# Patient Record
Sex: FEMALE | Race: WHITE | NOT HISPANIC OR LATINO | Employment: PART TIME | URBAN - NONMETROPOLITAN AREA
[De-identification: names, ages, dates, MRNs, and addresses within clinical notes are randomized per-mention and may not be internally consistent; named-entity substitution may affect disease eponyms.]

---

## 2017-01-09 ENCOUNTER — HOSPITAL ENCOUNTER (OUTPATIENT)
Dept: EDUCATION SERVICES | Facility: HOSPITAL | Age: 52
Discharge: HOME | End: 2017-01-09
Attending: FAMILY MEDICINE | Admitting: FAMILY MEDICINE
Payer: COMMERCIAL

## 2017-01-09 VITALS
DIASTOLIC BLOOD PRESSURE: 77 MMHG | WEIGHT: 206.5 LBS | BODY MASS INDEX: 32.41 KG/M2 | OXYGEN SATURATION: 96 % | SYSTOLIC BLOOD PRESSURE: 112 MMHG | HEIGHT: 67 IN | HEART RATE: 62 BPM

## 2017-01-09 DIAGNOSIS — Z71.89 ACP (ADVANCE CARE PLANNING): Primary | Chronic | ICD-10-CM

## 2017-01-09 DIAGNOSIS — E11.9 TYPE 2 DIABETES MELLITUS WITHOUT COMPLICATION, WITHOUT LONG-TERM CURRENT USE OF INSULIN (H): ICD-10-CM

## 2017-01-09 PROCEDURE — G0108 DIAB MANAGE TRN  PER INDIV: HCPCS | Performed by: DIETITIAN, REGISTERED

## 2017-01-09 ASSESSMENT — PAIN SCALES - GENERAL: PAINLEVEL: NO PAIN (0)

## 2017-01-09 NOTE — IP AVS SNAPSHOT
MRN:[id number]                      After Visit Summary   1/9/2017    Melba Hill    MRN: [id number]           Thank you!     Thank you for choosing [facility] for your care. Our goal is always to provide you with excellent care. Hearing back from our patients is one way we can continue to improve our services. Please take a few minutes to complete the written survey that you may receive in the mail after you visit with us. Thank you!        Patient Information     Date Of Birth          1965        About your hospital stay     You were admitted on:  January 9, 2017 You last received care in the:  [facility]    You were discharged on:  January 9, 2017       Who to Call     For medical emergencies, please call 911.  For non-urgent questions about your medical care, please call your primary care provider or clinic, [phone]          Attending Provider     Provider    Alvin Connolly MD       Primary Care Provider Office Phone # Fax #    Alvin Connolly [phone]       [facility] CLIN[facility] [address]  [place] [address]        Your next 10 appointments already scheduled     Jan 30, 2017  8:45 AM   (Arrive by 8:30 AM)   SHORT with Alvin Connolly MD   [facility] [place] ([facility] [place] [facility])    [address]  [place] [address]   [phone]            Mar 27, 2017  8:30 AM   (Arrive by 8:15 AM)   SHORT with Alvin Connolly MD   [facility] [place] ([facility] [place] [facility])    [address]  [place] [address]   [phone]              Further instructions from your care team       -Keep trying to limit carbohydrates in your diet.  3 choices (45 grams/meal), 1-2 choices (15-30 grams/snack).   -Exercise goal is 30 minutes most days of the week.    -Test 1x/day - alternate times - fasting, before supper and 2 hours after supper  -Target levels are fasting and before meals , 2 hours after meals less than 180.   -Keep taking your Metformin  "and Januvia as ordered.   -I will call you if there is a cheaper meter option.  [facility] has Relion Meter which can be purchased over the counter.   -Follow up in one year - we will call you.   -Call with and concerns - AmericaMEHRDAD Obrien, -[phone]    Pending Results     No orders found from 2017 to 1/10/2017.            Admission Information        Provider Department Dept Phone    2017 Alvin Connolly MD [facility] [phone]      Your Vitals Were     Blood Pressure Pulse Height Weight BMI (Body Mass Index) Pulse Oximetry    112/77 mmHg 62 1.702 m (5' 7\") 93.668 kg (206 lb 8 oz) 32.33 kg/m2 96%    Last Period                   (LMP Unknown)           MyChart Information     [employer] lets you send messages to your doctor, view your test results, renew your prescriptions, schedule appointments and more. To sign up, go to [email or web address] . Click on \"Log in\" on the left side of the screen, which will take you to the Welcome page. Then click on \"Sign up Now\" on the right side of the page.     You will be asked to enter the access code listed below, as well as some personal information. Please follow the directions to create your username and password.     Your access code is: [email or web address]  Expires: 3/20/2017  9:41 AM     Your access code will  in 90 days. If you need help or a new code, please call your [facility] clinic or [phone].        Care EveryWhere ID     This is your Care EveryWhere ID. This could be used by other organizations to access your [facility] medical records  [id number]           Review of your medicines      UNREVIEWED medicines. Ask your doctor about these medicines        Dose / Directions    loratadine 10 MG tablet   Commonly known as:  CLARITIN        Dose:  10 mg   Take 10 mg by mouth daily as needed   Refills:  0       Lutein 20 MG Caps        Take by mouth daily   Refills:  0       metFORMIN 500 MG 24 hr tablet   Commonly known as:  GLUCOPHAGE-XR   Used " for:  Type 2 diabetes mellitus without complication, without long-term current use of insulin (H)        TAKE 3 TABLETS BY MOUTH EMEKA LY WITH DINNER   Quantity:  270 tablet   Refills:  1       NONFORMULARY        Potassium 99 mg once daily   Refills:  0       NUTRITIONAL SUPPLEMENT PO   Indication:  Icool- hot flashes        Dose:  1 tablet   Take 1 tablet by mouth   Refills:  0       omeprazole 20 MG CR capsule   Commonly known as:  priLOSEC        Take by mouth daily   Refills:  0       simvastatin 20 MG tablet   Commonly known as:  ZOCOR   Used for:  Mixed hyperlipidemia        TAKE 1 TABLET BY MOUTH AT BEDTIME - GENERIC FOR ZOCOR   Quantity:  30 tablet   Refills:  3       sitagliptin 100 MG tablet   Commonly known as:  JANUVIA   Used for:  Type 2 diabetes mellitus without complication, without long-term current use of insulin (H)        Dose:  100 mg   Take 1 tablet (100 mg) by mouth daily   Quantity:  90 tablet   Refills:  1       tiZANidine 4 MG tablet   Commonly known as:  ZANAFLEX   Used for:  DDD (degenerative disc disease), lumbar        Dose:  4 mg   Take 1 tablet (4 mg) by mouth 3 times daily as needed for muscle spasms   Quantity:  30 tablet   Refills:  2       vitamin B complex with vitamin C Tabs tablet        Dose:  1 tablet   Take 1 tablet by mouth daily   Refills:  0         CONTINUE these medicines which have NOT CHANGED        Dose / Directions    ACCU-CHEK SMARTVIEW test strip   Used for:  Diabetes mellitus, type 2 (H)   Generic drug:  blood glucose monitoring        USE TO TEST BLOOD SUGARS THREE TIMES DAILY   Quantity:  100 each   Refills:  11       blood glucose monitoring lancets   Used for:  Diabetes mellitus, type 2 (H)        Please use fresh needle to check blood glucose three times a day   Quantity:  100 each   Refills:  12                Protect others around you: Learn how to safely use, store and throw away your medicines at www.disposemymeds.org.             Medication List: This is  a list of all your medications and when to take them. Check marks below indicate your daily home schedule. Keep this list as a reference.      Medications           Morning Afternoon Evening Bedtime As Needed    ACCU-CHEK SMARTVIEW test strip   USE TO TEST BLOOD SUGARS THREE TIMES DAILY   Generic drug:  blood glucose monitoring                                blood glucose monitoring lancets   Please use fresh needle to check blood glucose three times a day                                loratadine 10 MG tablet   Commonly known as:  CLARITIN   Take 10 mg by mouth daily as needed                                Lutein 20 MG Caps   Take by mouth daily                                metFORMIN 500 MG 24 hr tablet   Commonly known as:  GLUCOPHAGE-XR   TAKE 3 TABLETS BY MOUTH EMEKA LY WITH DINNER                                NONFORMULARY   Potassium 99 mg once daily                                NUTRITIONAL SUPPLEMENT PO   Take 1 tablet by mouth                                omeprazole 20 MG CR capsule   Commonly known as:  priLOSEC   Take by mouth daily                                simvastatin 20 MG tablet   Commonly known as:  ZOCOR   TAKE 1 TABLET BY MOUTH AT BEDTIME - GENERIC FOR ZOCOR                                sitagliptin 100 MG tablet   Commonly known as:  JANUVIA   Take 1 tablet (100 mg) by mouth daily                                tiZANidine 4 MG tablet   Commonly known as:  ZANAFLEX   Take 1 tablet (4 mg) by mouth 3 times daily as needed for muscle spasms                                vitamin B complex with vitamin C Tabs tablet   Take 1 tablet by mouth daily

## 2017-01-09 NOTE — DISCHARGE INSTRUCTIONS
-Keep trying to limit carbohydrates in your diet.  3 choices (45 grams/meal), 1-2 choices (15-30 grams/snack).   -Exercise goal is 30 minutes most days of the week.    -Test 1x/day - alternate times - fasting, before supper and 2 hours after supper  -Target levels are fasting and before meals , 2 hours after meals less than 180.   -Keep taking your Metformin and Januvia as ordered.   -I will call you if there is a cheaper meter option.  [facility] has Relion Meter which can be purchased over the counter.   -Follow up in one year - we will call you.   -Call with and concerns - MEHRDAD Carter, -[phone]

## 2017-01-09 NOTE — IP AVS SNAPSHOT
[facility]    [address] [place] [address]    Phone:  [phone]    Fax:  [phone]                                       After Visit Summary   1/9/2017    Melba Hill    MRN: [id number]           After Visit Summary Signature Page     I have received my discharge instructions, and my questions have been answered. I have discussed any challenges I see with this plan with the nurse or doctor.    ..........................................................................................................................................  Patient/Patient Representative Signature      ..........................................................................................................................................  Patient Representative Print Name and Relationship to Patient    ..................................................               ................................................  Date                                            Time    ..........................................................................................................................................  Reviewed by Signature/Title    ...................................................              ..............................................  Date                                                            Time

## 2017-01-30 ENCOUNTER — OFFICE VISIT (OUTPATIENT)
Dept: FAMILY MEDICINE | Facility: OTHER | Age: 52
End: 2017-01-30
Attending: FAMILY MEDICINE
Payer: COMMERCIAL

## 2017-01-30 VITALS
HEART RATE: 74 BPM | TEMPERATURE: 96.9 F | SYSTOLIC BLOOD PRESSURE: 122 MMHG | RESPIRATION RATE: 17 BRPM | DIASTOLIC BLOOD PRESSURE: 68 MMHG | WEIGHT: 201 LBS | BODY MASS INDEX: 31.47 KG/M2

## 2017-01-30 DIAGNOSIS — E11.9 TYPE 2 DIABETES MELLITUS WITHOUT COMPLICATION, WITHOUT LONG-TERM CURRENT USE OF INSULIN (H): Primary | ICD-10-CM

## 2017-01-30 LAB
EST. AVERAGE GLUCOSE BLD GHB EST-MCNC: 200 MG/DL
HBA1C MFR BLD: 8.6 % (ref 4.3–6)

## 2017-01-30 PROCEDURE — 83036 HEMOGLOBIN GLYCOSYLATED A1C: CPT | Performed by: FAMILY MEDICINE

## 2017-01-30 PROCEDURE — 99213 OFFICE O/P EST LOW 20 MIN: CPT | Performed by: FAMILY MEDICINE

## 2017-01-30 PROCEDURE — 36415 COLL VENOUS BLD VENIPUNCTURE: CPT | Performed by: FAMILY MEDICINE

## 2017-01-30 ASSESSMENT — PAIN SCALES - GENERAL: PAINLEVEL: NO PAIN (0)

## 2017-01-30 NOTE — PROGRESS NOTES
SUBJECTIVE:                                                    Melba Hill is a 51 year old female who presents to clinic today for the following health issues:      Diabetes Follow-up    Patient is checking blood sugars: once daily.  Results are as follows:         Varies throughout day     Diabetic concerns: None     Symptoms of hypoglycemia (low blood sugar): shaky     Paresthesias (numbness or burning in feet) or sores: No     Date of last diabetic eye exam: will be scheduling in the future     Doing much better and  numbers avg in low 100's   No symptomatic  low BS- has been working  with [facility].        Amount of exercise or physical activity: 6-7 days/week for an average of 15-30 minutes    Problems taking medications regularly: No    Medication side effects: none    Diet: diabetic and carbohydrate counting        Problem list and histories reviewed & adjusted, as indicated.  Additional history: as documented    Problem list, Medication list, Allergies, and Medical/Social/Surgical histories reviewed in EPIC and updated as appropriate.    ROS:  C: NEGATIVE for fever, chills, change in weight  R: NEGATIVE for significant cough or SOB  CV: NEGATIVE for chest pain, palpitations or peripheral edema    OBJECTIVE:                                                    /68 mmHg  Pulse 74  Temp(Src) 96.9  F (36.1  C)  Resp 17  Wt 201 lb (91.173 kg)  LMP  (LMP Unknown)  Body mass index is 31.47 kg/(m^2).   GENERAL: healthy, alert, well nourished, well hydrated, no distress- pt is happy with progress.    Results for orders placed or performed in visit on 01/30/17 (from the past 24 hour(s))   Hemoglobin A1c   Result Value Ref Range    Hemoglobin A1C 8.6 (H) 4.3 - 6.0 %   Estimated Average Glucose   Result Value Ref Range    Estimated Average Glucose 200 mg/dL          ASSESSMENT/PLAN:                                                    1. Type 2 diabetes mellitus without complication, without long-term  current use of insulin (H)  A1C done early to make sure going in right direction - confirms her numbers are much better. Keep up good work. Will see back in 4 months. Fasting. Continue current medications and behavioral changes.   Discussion on numbers and her carb counting done..Very happy with her progress.   - Hemoglobin A1c; Future  - Hemoglobin A1c  - Estimated Average Glucose      See Patient Instructions    Alvin Connolly MD  [facility]

## 2017-01-30 NOTE — MR AVS SNAPSHOT
"              After Visit Summary   1/30/2017    Melba Hill    MRN: [id number]           Patient Information     Date Of Birth          1965        Visit Information        Provider Department      1/30/2017 8:45 AM Alvin Connolly MD [facility] [place]        Today's Diagnoses     Type 2 diabetes mellitus without complication, without long-term current use of insulin (H)    -  1        Follow-ups after your visit        Your next 10 appointments already scheduled     May 17, 2017  7:45 AM   (Arrive by 7:30 AM)   SHORT with Alvin Connolly MD   [facility] ([facility])    [address] [place] [address]   [phone]              Who to contact     If you have questions or need follow up information about today's clinic visit or your schedule please contact [facility] directly at [phone].  Normal or non-critical lab and imaging results will be communicated to you by MyChart, letter or phone within 4 business days after the clinic has received the results. If you do not hear from us within 7 days, please contact the clinic through MyChart or phone. If you have a critical or abnormal lab result, we will notify you by phone as soon as possible.  Submit refill requests through [employer] or call your pharmacy and they will forward the refill request to us. Please allow 3 business days for your refill to be completed.          Additional Information About Your Visit        MyChart Information     [employer] lets you send messages to your doctor, view your test results, renew your prescriptions, schedule appointments and more. To sign up, go to [email or web address] . Click on \"Log in\" on the left side of the screen, which will take you to the Welcome page. Then click on \"Sign up Now\" on the right side of the page.     You will be asked to enter the access code listed below, as well as some personal information. Please follow the directions to create your username " and password.     Your access code is: [email or web address]  Expires: 3/20/2017  9:41 AM     Your access code will  in 90 days. If you need help or a new code, please call your [facility] clinic or [phone].        Care EveryWhere ID     This is your Care EveryWhere ID. This could be used by other organizations to access your [facility] medical records  [id number]        Your Vitals Were     Pulse Temperature Respirations Last Period          74 96.9  F (36.1  C) 17 (LMP Unknown)         Blood Pressure from Last 3 Encounters:   17 122/68   17 112/77   16 122/73    Weight from Last 3 Encounters:   17 201 lb (91.173 kg)   17 206 lb 8 oz (93.668 kg)   16 198 lb (89.812 kg)              We Performed the Following     Estimated Average Glucose     Hemoglobin A1c        Primary Care Provider Office Phone # Fax #    Alvin Connolly [phone]       [facility] [address] [facility] [address]        Thank you!     Thank you for choosing [facility]  for your care. Our goal is always to provide you with excellent care. Hearing back from our patients is one way we can continue to improve our services. Please take a few minutes to complete the written survey that you may receive in the mail after your visit with us. Thank you!             Your Updated Medication List - Protect others around you: Learn how to safely use, store and throw away your medicines at www.disposemymeds.org.          This list is accurate as of: 17  9:11 AM.  Always use your most recent med list.                   Brand Name Dispense Instructions for use    ACCU-CHEK SMARTVIEW test strip   Generic drug:  blood glucose monitoring     100 each    USE TO TEST BLOOD SUGARS THREE TIMES DAILY       blood glucose monitoring lancets     100 each    Please use fresh needle to check blood glucose three times a day       loratadine 10 MG tablet    CLARITIN     Take 10 mg by mouth  daily as needed       Lutein 20 MG Caps      Take by mouth daily       metFORMIN 500 MG 24 hr tablet    GLUCOPHAGE-XR    270 tablet    TAKE 3 TABLETS BY MOUTH EMEKA LY WITH DINNER       NONFORMULARY      Potassium 99 mg once daily       NUTRITIONAL SUPPLEMENT PO      Take 1 tablet by mouth       omeprazole 20 MG CR capsule    priLOSEC     Take by mouth daily       simvastatin 20 MG tablet    ZOCOR    30 tablet    TAKE 1 TABLET BY MOUTH AT BEDTIME - GENERIC FOR ZOCOR       sitagliptin 100 MG tablet    JANUVIA    90 tablet    Take 1 tablet (100 mg) by mouth daily       tiZANidine 4 MG tablet    ZANAFLEX    30 tablet    Take 1 tablet (4 mg) by mouth 3 times daily as needed for muscle spasms       vitamin B complex with vitamin C Tabs tablet      Take 1 tablet by mouth daily

## 2017-01-30 NOTE — NURSING NOTE
"Chief Complaint   Patient presents with     Diabetes       Initial /68 mmHg  Pulse 74  Temp(Src) 96.9  F (36.1  C)  Resp 17  Wt 201 lb (91.173 kg)  LMP  (LMP Unknown) Estimated body mass index is 31.47 kg/(m^2) as calculated from the following:    Height as of 1/9/17: 5' 7\" (1.702 m).    Weight as of this encounter: 201 lb (91.173 kg).  BP completed using cuff size: regular  "

## 2017-04-02 DIAGNOSIS — E78.2 MIXED HYPERLIPIDEMIA: ICD-10-CM

## 2017-04-04 NOTE — TELEPHONE ENCOUNTER
Zocor     Last Written Prescription Date: 12/7/16  Last Fill Quantity: 30, # refills: 3  Last Office Visit with [facility], [facility] or  Health prescribing provider: 1/30/17  Next 5 appointments (look out 90 days)     May 17, 2017  7:45 AM CDT   (Arrive by 7:30 AM)   SHORT with Alvin Connolly MD   [facility]    [facility] [address]   [phone]                   Lab Results   Component Value Date    CHOL 123 04/18/2016     Lab Results   Component Value Date    HDL 37 04/18/2016     Lab Results   Component Value Date    LDL 51 04/18/2016     Lab Results   Component Value Date    TRIG 176 04/18/2016     Lab Results   Component Value Date    CHOLHDLRATIO 4.9 04/27/2015

## 2017-04-05 RX ORDER — SIMVASTATIN 20 MG
TABLET ORAL
Qty: 30 TABLET | Refills: 0 | Status: SHIPPED | OUTPATIENT
Start: 2017-04-05 | End: 2017-05-02

## 2017-05-02 DIAGNOSIS — E78.2 MIXED HYPERLIPIDEMIA: ICD-10-CM

## 2017-05-05 RX ORDER — SIMVASTATIN 20 MG
TABLET ORAL
Qty: 30 TABLET | Refills: 0 | Status: SHIPPED | OUTPATIENT
Start: 2017-05-05 | End: 2017-05-17

## 2017-05-17 ENCOUNTER — OFFICE VISIT (OUTPATIENT)
Dept: FAMILY MEDICINE | Facility: OTHER | Age: 52
End: 2017-05-17
Attending: FAMILY MEDICINE
Payer: COMMERCIAL

## 2017-05-17 VITALS
WEIGHT: 205 LBS | HEART RATE: 78 BPM | DIASTOLIC BLOOD PRESSURE: 72 MMHG | TEMPERATURE: 96.3 F | HEIGHT: 67 IN | BODY MASS INDEX: 32.18 KG/M2 | SYSTOLIC BLOOD PRESSURE: 118 MMHG

## 2017-05-17 DIAGNOSIS — E78.2 MIXED HYPERLIPIDEMIA: ICD-10-CM

## 2017-05-17 DIAGNOSIS — E11.9 TYPE 2 DIABETES MELLITUS WITHOUT COMPLICATION, WITHOUT LONG-TERM CURRENT USE OF INSULIN (H): ICD-10-CM

## 2017-05-17 DIAGNOSIS — E11.9 TYPE 2 DIABETES MELLITUS WITHOUT COMPLICATION, WITHOUT LONG-TERM CURRENT USE OF INSULIN (H): Primary | ICD-10-CM

## 2017-05-17 LAB
ALBUMIN SERPL-MCNC: 3.7 G/DL (ref 3.4–5)
ALP SERPL-CCNC: 109 U/L (ref 40–150)
ALT SERPL W P-5'-P-CCNC: 53 U/L (ref 0–50)
ANION GAP SERPL CALCULATED.3IONS-SCNC: 10 MMOL/L (ref 3–14)
AST SERPL W P-5'-P-CCNC: 40 U/L (ref 0–45)
BILIRUB SERPL-MCNC: 0.9 MG/DL (ref 0.2–1.3)
BUN SERPL-MCNC: 17 MG/DL (ref 7–30)
CALCIUM SERPL-MCNC: 9.5 MG/DL (ref 8.5–10.1)
CHLORIDE SERPL-SCNC: 105 MMOL/L (ref 94–109)
CHOLEST SERPL-MCNC: 125 MG/DL
CO2 SERPL-SCNC: 27 MMOL/L (ref 20–32)
CREAT SERPL-MCNC: 0.86 MG/DL (ref 0.52–1.04)
EST. AVERAGE GLUCOSE BLD GHB EST-MCNC: 146 MG/DL
GFR SERPL CREATININE-BSD FRML MDRD: 68 ML/MIN/1.7M2
GLUCOSE SERPL-MCNC: 109 MG/DL (ref 70–99)
HBA1C MFR BLD: 6.7 % (ref 4.3–6)
HDLC SERPL-MCNC: 44 MG/DL
LDLC SERPL CALC-MCNC: 52 MG/DL
NONHDLC SERPL-MCNC: 81 MG/DL
POTASSIUM SERPL-SCNC: 4 MMOL/L (ref 3.4–5.3)
PROT SERPL-MCNC: 7.9 G/DL (ref 6.8–8.8)
SODIUM SERPL-SCNC: 142 MMOL/L (ref 133–144)
TRIGL SERPL-MCNC: 145 MG/DL

## 2017-05-17 PROCEDURE — 80061 LIPID PANEL: CPT | Performed by: FAMILY MEDICINE

## 2017-05-17 PROCEDURE — 99213 OFFICE O/P EST LOW 20 MIN: CPT | Performed by: FAMILY MEDICINE

## 2017-05-17 PROCEDURE — 36415 COLL VENOUS BLD VENIPUNCTURE: CPT | Performed by: FAMILY MEDICINE

## 2017-05-17 PROCEDURE — 80053 COMPREHEN METABOLIC PANEL: CPT | Performed by: FAMILY MEDICINE

## 2017-05-17 PROCEDURE — 83036 HEMOGLOBIN GLYCOSYLATED A1C: CPT | Performed by: FAMILY MEDICINE

## 2017-05-17 RX ORDER — METFORMIN HCL 500 MG
TABLET, EXTENDED RELEASE 24 HR ORAL
Qty: 270 TABLET | Refills: 1 | Status: SHIPPED | OUTPATIENT
Start: 2017-05-17 | End: 2017-11-28

## 2017-05-17 RX ORDER — SIMVASTATIN 20 MG
TABLET ORAL
Qty: 90 TABLET | Refills: 3 | Status: SHIPPED | OUTPATIENT
Start: 2017-05-17 | End: 2018-05-27

## 2017-05-17 ASSESSMENT — ANXIETY QUESTIONNAIRES
6. BECOMING EASILY ANNOYED OR IRRITABLE: NOT AT ALL
3. WORRYING TOO MUCH ABOUT DIFFERENT THINGS: NOT AT ALL
GAD7 TOTAL SCORE: 0
1. FEELING NERVOUS, ANXIOUS, OR ON EDGE: NOT AT ALL
5. BEING SO RESTLESS THAT IT IS HARD TO SIT STILL: NOT AT ALL
2. NOT BEING ABLE TO STOP OR CONTROL WORRYING: NOT AT ALL
7. FEELING AFRAID AS IF SOMETHING AWFUL MIGHT HAPPEN: NOT AT ALL
IF YOU CHECKED OFF ANY PROBLEMS ON THIS QUESTIONNAIRE, HOW DIFFICULT HAVE THESE PROBLEMS MADE IT FOR YOU TO DO YOUR WORK, TAKE CARE OF THINGS AT HOME, OR GET ALONG WITH OTHER PEOPLE: NOT DIFFICULT AT ALL

## 2017-05-17 ASSESSMENT — PAIN SCALES - GENERAL: PAINLEVEL: MILD PAIN (2)

## 2017-05-17 ASSESSMENT — PATIENT HEALTH QUESTIONNAIRE - PHQ9: 5. POOR APPETITE OR OVEREATING: NOT AT ALL

## 2017-05-17 NOTE — MR AVS SNAPSHOT
After Visit Summary   5/17/2017    Melba Hill    MRN: [id number]           Patient Information     Date Of Birth          1965        Visit Information        Provider Department      5/17/2017 7:45 AM Alvin Connolly MD [facility] [place]        Today's Diagnoses     Type 2 diabetes mellitus without complication, without long-term current use of insulin (H)    -  1    Mixed hyperlipidemia          Care Instructions    GREAT JOB!!!!!!!        Follow-ups after your visit        Your next 10 appointments already scheduled     Oct 18, 2017  8:00 AM CDT   (Arrive by 7:45 AM)   PHYSICAL with Alvin Connolly MD   [facility] ([facility]    [address]  [facility] [address]   [phone]              Who to contact     If you have questions or need follow up information about today's clinic visit or your schedule please contact [facility] directly at [phone].  Normal or non-critical lab and imaging results will be communicated to you by MyChart, letter or phone within 4 business days after the clinic has received the results. If you do not hear from us within 7 days, please contact the clinic through [employer] or phone. If you have a critical or abnormal lab result, we will notify you by phone as soon as possible.  Submit refill requests through [employer] or call your pharmacy and they will forward the refill request to us. Please allow 3 business days for your refill to be completed.          Additional Information About Your Visit        MyChart Information     [employer] gives you secure access to your electronic health record. If you see a primary care provider, you can also send messages to your care team and make appointments. If you have questions, please call your primary care clinic.  If you do not have a primary care provider, please call [phone] and they will assist you.        Care EveryWhere ID     This is your Care EveryWhere ID. This  "could be used by other organizations to access your [facility] medical records  [id number]        Your Vitals Were     Pulse Temperature Height Last Period BMI (Body Mass Index)       78 96.3  F (35.7  C) 5' 7\" (1.702 m) (LMP Unknown) 32.11 kg/m2        Blood Pressure from Last 3 Encounters:   05/17/17 118/72   01/30/17 122/68   01/09/17 112/77    Weight from Last 3 Encounters:   05/17/17 205 lb (93 kg)   01/30/17 201 lb (91.2 kg)   01/09/17 206 lb 8 oz (93.7 kg)                 Today's Medication Changes          These changes are accurate as of: 5/17/17  8:24 AM.  If you have any questions, ask your nurse or doctor.               These medicines have changed or have updated prescriptions.        Dose/Directions    simvastatin 20 MG tablet   Commonly known as:  ZOCOR   This may have changed:  See the new instructions.   Used for:  Mixed hyperlipidemia   Changed by:  Alvin Connolly MD        TAKE 1 TABLET BY MOUTH ONCE DAILY AT BEDTIME   Quantity:  90 tablet   Refills:  3            Where to get your medicines      These medications were sent to [id number] - [place] - [address]  [address] [facility] [address]     Phone:  [phone]     metFORMIN 500 MG 24 hr tablet    simvastatin 20 MG tablet    sitagliptin 100 MG tablet                Primary Care Provider Office Phone # Fax #    Alvin Connolly [phone]       [facility] [address] [place] [address]  [place] [address]        Thank you!     Thank you for choosing [facility]  for your care. Our goal is always to provide you with excellent care. Hearing back from our patients is one way we can continue to improve our services. Please take a few minutes to complete the written survey that you may receive in the mail after your visit with us. Thank you!             Your Updated Medication List - Protect others around you: Learn how to safely use, store and throw away your medicines at " www.disposemymeds.org.          This list is accurate as of: 5/17/17  8:24 AM.  Always use your most recent med list.                   Brand Name Dispense Instructions for use    ACCU-CHEK SMARTVIEW test strip   Generic drug:  blood glucose monitoring     100 each    USE TO TEST BLOOD SUGARS THREE TIMES DAILY       blood glucose monitoring lancets     100 each    Please use fresh needle to check blood glucose three times a day       loratadine 10 MG tablet    CLARITIN     Take 10 mg by mouth daily as needed       Lutein 20 MG Caps      Take by mouth daily       metFORMIN 500 MG 24 hr tablet    GLUCOPHAGE-XR    270 tablet    TAKE 3 TABLETS BY MOUTH EMEKA LY WITH DINNER       NONFORMULARY      Potassium 99 mg once daily       NUTRITIONAL SUPPLEMENT PO      Take 1 tablet by mouth       omeprazole 20 MG CR capsule    priLOSEC     Take by mouth daily       simvastatin 20 MG tablet    ZOCOR    90 tablet    TAKE 1 TABLET BY MOUTH ONCE DAILY AT BEDTIME       sitagliptin 100 MG tablet    JANUVIA    90 tablet    Take 1 tablet (100 mg) by mouth daily       tiZANidine 4 MG tablet    ZANAFLEX    30 tablet    Take 1 tablet (4 mg) by mouth 3 times daily as needed for muscle spasms       vitamin B complex with vitamin C Tabs tablet      Take 1 tablet by mouth daily

## 2017-05-17 NOTE — NURSING NOTE
"Chief Complaint   Patient presents with     Diabetes       Initial /72  Pulse 78  Temp 96.3  F (35.7  C)  Ht 5' 7\" (1.702 m)  Wt 205 lb (93 kg)  LMP  (LMP Unknown)  BMI 32.11 kg/m2 Estimated body mass index is 32.11 kg/(m^2) as calculated from the following:    Height as of this encounter: 5' 7\" (1.702 m).    Weight as of this encounter: 205 lb (93 kg).  Medication Reconciliation: complete     Vikas Legegtt      "

## 2017-05-17 NOTE — PROGRESS NOTES
"  SUBJECTIVE:                                                    Melba Hill is a 52 year old female who presents to clinic today for the following health issues:      Diabetes Follow-up    Patient is checking blood sugars: once daily.  Results are as follows:         am - 87         suppertime - 130    Diabetic concerns: None     Symptoms of hypoglycemia (low blood sugar): none     Paresthesias (numbness or burning in feet) or sores: No     Date of last diabetic eye exam: due       Amount of exercise or physical activity: 6-7 days/week for an average of 30-45 minutes    Problems taking medications regularly: No    Medication side effects: none    Diet: carbohydrate counting      Hyperlipidemia Follow-Up      Rate your low fat/cholesterol diet?: good    Taking statin?  Yes, no muscle aches from statin    Other lipid medications/supplements?:  none       Problem list and histories reviewed & adjusted, as indicated.  Additional history: as documented    Labs reviewed in EPIC    Reviewed and updated as needed this visit by clinical staff  Tobacco  Allergies  Meds  Med Hx  Surg Hx  Fam Hx  Soc Hx      Reviewed and updated as needed this visit by Provider         ROS:  C: NEGATIVE for fever, chills, change in weight  E/M: NEGATIVE for ear, mouth and throat problems  R: NEGATIVE for significant cough or SOB  CV: NEGATIVE for chest pain, palpitations or peripheral edema    OBJECTIVE:                                                    /72  Pulse 78  Temp 96.3  F (35.7  C)  Ht 5' 7\" (1.702 m)  Wt 205 lb (93 kg)  LMP  (LMP Unknown)  BMI 32.11 kg/m2  Body mass index is 32.11 kg/(m^2).   GENERAL: healthy, alert, well nourished, well hydrated, no distress  NECK: no tenderness, no adenopathy, no asymmetry, no masses, no stiffness; thyroid- normal to palpation  RESP: lungs clear to auscultation - no rales, no rhonchi, no wheezes  CV: regular rates and rhythm, normal S1 S2, no S3 or S4 and no murmur, no click " or rub -    Results for orders placed or performed in visit on 05/17/17 (from the past 24 hour(s))   Lipid Profile   Result Value Ref Range    Cholesterol 125 <200 mg/dL    Triglycerides 145 <150 mg/dL    HDL Cholesterol 44 (L) >49 mg/dL    LDL Cholesterol Calculated 52 <100 mg/dL    Non HDL Cholesterol 81 <130 mg/dL   Hemoglobin A1c   Result Value Ref Range    Hemoglobin A1C 6.7 (H) 4.3 - 6.0 %   Comprehensive metabolic panel   Result Value Ref Range    Sodium 142 133 - 144 mmol/L    Potassium 4.0 3.4 - 5.3 mmol/L    Chloride 105 94 - 109 mmol/L    Carbon Dioxide 27 20 - 32 mmol/L    Anion Gap 10 3 - 14 mmol/L    Glucose 109 (H) 70 - 99 mg/dL    Urea Nitrogen 17 7 - 30 mg/dL    Creatinine 0.86 0.52 - 1.04 mg/dL    GFR Estimate 68 >60 mL/min/1.7m2    GFR Estimate If Black 83 >60 mL/min/1.7m2    Calcium 9.5 8.5 - 10.1 mg/dL    Bilirubin Total 0.9 0.2 - 1.3 mg/dL    Albumin 3.7 3.4 - 5.0 g/dL    Protein Total 7.9 6.8 - 8.8 g/dL    Alkaline Phosphatase 109 40 - 150 U/L    ALT 53 (H) 0 - 50 U/L    AST 40 0 - 45 U/L          ASSESSMENT/PLAN:                                                    1. Type 2 diabetes mellitus without complication, without long-term current use of insulin (H)  AWESOME job- BS good control.  F/u in 4 months Continue current medications and behavioral changes.   - Lipid Profile; Future  - Hemoglobin A1c; Future  - Comprehensive metabolic panel; Future    2. Mixed hyperlipidemia  Great control.   - Lipid Profile; Future  - Hemoglobin A1c; Future  - Comprehensive metabolic panel; Future      See Patient Instructions    Alvin Connolly MD  [facility]

## 2017-05-18 ASSESSMENT — PATIENT HEALTH QUESTIONNAIRE - PHQ9: SUM OF ALL RESPONSES TO PHQ QUESTIONS 1-9: 3

## 2017-05-18 ASSESSMENT — ANXIETY QUESTIONNAIRES: GAD7 TOTAL SCORE: 0

## 2017-06-26 ENCOUNTER — TRANSFERRED RECORDS (OUTPATIENT)
Dept: HEALTH INFORMATION MANAGEMENT | Facility: HOSPITAL | Age: 52
End: 2017-06-26

## 2017-09-05 ENCOUNTER — APPOINTMENT (OUTPATIENT)
Dept: OCCUPATIONAL MEDICINE | Facility: OTHER | Age: 52
End: 2017-09-05

## 2017-09-05 PROCEDURE — 86580 TB INTRADERMAL TEST: CPT

## 2017-10-18 ENCOUNTER — OFFICE VISIT (OUTPATIENT)
Dept: FAMILY MEDICINE | Facility: OTHER | Age: 52
End: 2017-10-18
Attending: FAMILY MEDICINE
Payer: COMMERCIAL

## 2017-10-18 ENCOUNTER — APPOINTMENT (OUTPATIENT)
Dept: LAB | Facility: OTHER | Age: 52
End: 2017-10-18
Attending: FAMILY MEDICINE
Payer: COMMERCIAL

## 2017-10-18 ENCOUNTER — RADIANT APPOINTMENT (OUTPATIENT)
Dept: GENERAL RADIOLOGY | Facility: OTHER | Age: 52
End: 2017-10-18
Attending: FAMILY MEDICINE
Payer: COMMERCIAL

## 2017-10-18 VITALS
WEIGHT: 206 LBS | DIASTOLIC BLOOD PRESSURE: 80 MMHG | SYSTOLIC BLOOD PRESSURE: 112 MMHG | BODY MASS INDEX: 33.11 KG/M2 | HEART RATE: 60 BPM | HEIGHT: 66 IN

## 2017-10-18 DIAGNOSIS — R87.810 CERVICAL HIGH RISK HUMAN PAPILLOMAVIRUS (HPV) DNA TEST POSITIVE: ICD-10-CM

## 2017-10-18 DIAGNOSIS — F17.201 TOBACCO ABUSE, IN REMISSION: ICD-10-CM

## 2017-10-18 DIAGNOSIS — F40.10 SOCIAL ANXIETY DISORDER: ICD-10-CM

## 2017-10-18 DIAGNOSIS — Z23 NEED FOR PROPHYLACTIC VACCINATION AND INOCULATION AGAINST INFLUENZA: ICD-10-CM

## 2017-10-18 DIAGNOSIS — E11.9 TYPE 2 DIABETES MELLITUS WITHOUT COMPLICATION, WITHOUT LONG-TERM CURRENT USE OF INSULIN (H): ICD-10-CM

## 2017-10-18 DIAGNOSIS — Z00.00 ROUTINE GENERAL MEDICAL EXAMINATION AT A HEALTH CARE FACILITY: Primary | ICD-10-CM

## 2017-10-18 DIAGNOSIS — K75.81 NASH (NONALCOHOLIC STEATOHEPATITIS): ICD-10-CM

## 2017-10-18 DIAGNOSIS — Z12.31 ENCOUNTER FOR SCREENING MAMMOGRAM FOR BREAST CANCER: ICD-10-CM

## 2017-10-18 DIAGNOSIS — E78.2 MIXED HYPERLIPIDEMIA: ICD-10-CM

## 2017-10-18 LAB
ALBUMIN SERPL-MCNC: 3.8 G/DL (ref 3.4–5)
ALP SERPL-CCNC: 112 U/L (ref 40–150)
ALT SERPL W P-5'-P-CCNC: 71 U/L (ref 0–50)
ANION GAP SERPL CALCULATED.3IONS-SCNC: 8 MMOL/L (ref 3–14)
AST SERPL W P-5'-P-CCNC: 60 U/L (ref 0–45)
BASOPHILS # BLD AUTO: 0.1 10E9/L (ref 0–0.2)
BASOPHILS NFR BLD AUTO: 0.7 %
BILIRUB SERPL-MCNC: 1 MG/DL (ref 0.2–1.3)
BUN SERPL-MCNC: 13 MG/DL (ref 7–30)
CALCIUM SERPL-MCNC: 9.7 MG/DL (ref 8.5–10.1)
CHLORIDE SERPL-SCNC: 103 MMOL/L (ref 94–109)
CO2 SERPL-SCNC: 28 MMOL/L (ref 20–32)
CREAT SERPL-MCNC: 0.85 MG/DL (ref 0.52–1.04)
DIFFERENTIAL METHOD BLD: NORMAL
EOSINOPHIL # BLD AUTO: 0.2 10E9/L (ref 0–0.7)
EOSINOPHIL NFR BLD AUTO: 2.2 %
ERYTHROCYTE [DISTWIDTH] IN BLOOD BY AUTOMATED COUNT: 13.1 % (ref 10–15)
EST. AVERAGE GLUCOSE BLD GHB EST-MCNC: 148 MG/DL
GFR SERPL CREATININE-BSD FRML MDRD: 69 ML/MIN/1.7M2
GLUCOSE SERPL-MCNC: 130 MG/DL (ref 70–99)
HBA1C MFR BLD: 6.8 % (ref 4.3–6)
HCT VFR BLD AUTO: 43.3 % (ref 35–47)
HGB BLD-MCNC: 14.6 G/DL (ref 11.7–15.7)
IMM GRANULOCYTES # BLD: 0 10E9/L (ref 0–0.4)
IMM GRANULOCYTES NFR BLD: 0.2 %
LYMPHOCYTES # BLD AUTO: 3.4 10E9/L (ref 0.8–5.3)
LYMPHOCYTES NFR BLD AUTO: 36.2 %
MCH RBC QN AUTO: 30.6 PG (ref 26.5–33)
MCHC RBC AUTO-ENTMCNC: 33.7 G/DL (ref 31.5–36.5)
MCV RBC AUTO: 91 FL (ref 78–100)
MONOCYTES # BLD AUTO: 0.5 10E9/L (ref 0–1.3)
MONOCYTES NFR BLD AUTO: 5.6 %
NEUTROPHILS # BLD AUTO: 5.2 10E9/L (ref 1.6–8.3)
NEUTROPHILS NFR BLD AUTO: 55.1 %
NRBC # BLD AUTO: 0 10*3/UL
NRBC BLD AUTO-RTO: 0 /100
PLATELET # BLD AUTO: 204 10E9/L (ref 150–450)
POTASSIUM SERPL-SCNC: 3.9 MMOL/L (ref 3.4–5.3)
PROT SERPL-MCNC: 8.5 G/DL (ref 6.8–8.8)
RBC # BLD AUTO: 4.77 10E12/L (ref 3.8–5.2)
SODIUM SERPL-SCNC: 139 MMOL/L (ref 133–144)
TSH SERPL DL<=0.005 MIU/L-ACNC: 2.09 MU/L (ref 0.4–4)
WBC # BLD AUTO: 9.4 10E9/L (ref 4–11)

## 2017-10-18 PROCEDURE — 90686 IIV4 VACC NO PRSV 0.5 ML IM: CPT | Performed by: FAMILY MEDICINE

## 2017-10-18 PROCEDURE — 80050 GENERAL HEALTH PANEL: CPT | Performed by: FAMILY MEDICINE

## 2017-10-18 PROCEDURE — 90471 IMMUNIZATION ADMIN: CPT | Performed by: FAMILY MEDICINE

## 2017-10-18 PROCEDURE — 83036 HEMOGLOBIN GLYCOSYLATED A1C: CPT | Performed by: FAMILY MEDICINE

## 2017-10-18 PROCEDURE — 87624 HPV HI-RISK TYP POOLED RSLT: CPT | Mod: 90 | Performed by: FAMILY MEDICINE

## 2017-10-18 PROCEDURE — 99396 PREV VISIT EST AGE 40-64: CPT | Mod: 25 | Performed by: FAMILY MEDICINE

## 2017-10-18 PROCEDURE — 36415 COLL VENOUS BLD VENIPUNCTURE: CPT | Performed by: FAMILY MEDICINE

## 2017-10-18 PROCEDURE — 88142 CYTOPATH C/V THIN LAYER: CPT | Performed by: FAMILY MEDICINE

## 2017-10-18 PROCEDURE — 71020 XR CHEST 2 VW: CPT | Mod: TC

## 2017-10-18 PROCEDURE — 99000 SPECIMEN HANDLING OFFICE-LAB: CPT | Performed by: FAMILY MEDICINE

## 2017-10-18 ASSESSMENT — ANXIETY QUESTIONNAIRES
7. FEELING AFRAID AS IF SOMETHING AWFUL MIGHT HAPPEN: NOT AT ALL
IF YOU CHECKED OFF ANY PROBLEMS ON THIS QUESTIONNAIRE, HOW DIFFICULT HAVE THESE PROBLEMS MADE IT FOR YOU TO DO YOUR WORK, TAKE CARE OF THINGS AT HOME, OR GET ALONG WITH OTHER PEOPLE: NOT DIFFICULT AT ALL
5. BEING SO RESTLESS THAT IT IS HARD TO SIT STILL: NOT AT ALL
6. BECOMING EASILY ANNOYED OR IRRITABLE: NOT AT ALL
2. NOT BEING ABLE TO STOP OR CONTROL WORRYING: SEVERAL DAYS
GAD7 TOTAL SCORE: 3
1. FEELING NERVOUS, ANXIOUS, OR ON EDGE: SEVERAL DAYS
3. WORRYING TOO MUCH ABOUT DIFFERENT THINGS: SEVERAL DAYS
4. TROUBLE RELAXING: NOT AT ALL

## 2017-10-18 ASSESSMENT — PAIN SCALES - GENERAL: PAINLEVEL: NO PAIN (0)

## 2017-10-18 ASSESSMENT — PATIENT HEALTH QUESTIONNAIRE - PHQ9: SUM OF ALL RESPONSES TO PHQ QUESTIONS 1-9: 5

## 2017-10-18 NOTE — LETTER
Melba Hill  : 1965        To Whom it may concern,    Please allow for patient to have her pet/  along with her in public places. Pet helps to calm  her anxiety and panic symptoms.            Dr. Alvin Connolly M.D.  [phone]

## 2017-10-18 NOTE — MR AVS SNAPSHOT
After Visit Summary   10/18/2017    Melba Hill    MRN: [id number]           Patient Information     Date Of Birth          1965        Visit Information        Provider Department      10/18/2017 7:40 AM Alvin Connolly MD [facility]        Today's Diagnoses     Routine general medical examination at a health care facility    -  1    Cervical high risk HPV (human papillomavirus) test        Type 2 diabetes mellitus without complication, without long-term current use of insulin (H)        Mixed hyperlipidemia        JIMÉNEZ (nonalcoholic steatohepatitis)        Tobacco abuse, in remission        Need for prophylactic vaccination and inoculation against influenza        Encounter for screening mammogram for breast cancer          Care Instructions      Preventive Health Recommendations  Female Ages 50 - 64    Yearly exam: See your health care provider every year in order to  o Review health changes.   o Discuss preventive care.    o Review your medicines if your doctor has prescribed any.      Get a Pap test every three years (unless you have an abnormal result and your provider advises testing more often).    If you get Pap tests with HPV test, you only need to test every 5 years, unless you have an abnormal result.     You do not need a Pap test if your uterus was removed (hysterectomy) and you have not had cancer.    You should be tested each year for STDs (sexually transmitted diseases) if you're at risk.     Have a mammogram every 1 to 2 years.    Have a colonoscopy at age 50, or have a yearly FIT test (stool test). These exams screen for colon cancer.      Have a cholesterol test every 5 years, or more often if advised.    Have a diabetes test (fasting glucose) every three years. If you are at risk for diabetes, you should have this test more often.     If you are at risk for osteoporosis (brittle bone disease), think about having a bone density scan (DEXA).    Shots: Get a flu  shot each year. Get a tetanus shot every 10 years.    Nutrition:     Eat at least 5 servings of fruits and vegetables each day.    Eat whole-grain bread, whole-wheat pasta and brown rice instead of white grains and rice.    Talk to your provider about Calcium and Vitamin D.     Lifestyle    Exercise at least 150 minutes a week (30 minutes a day, 5 days a week). This will help you control your weight and prevent disease.    Limit alcohol to one drink per day.    No smoking.     Wear sunscreen to prevent skin cancer.     See your dentist every six months for an exam and cleaning.    See your eye doctor every 1 to 2 years.            Follow-ups after your visit        Your next 10 appointments already scheduled     Mar 20, 2018  8:45 AM CDT   (Arrive by 8:30 AM)   SHORT with Alvin Connolly MD   [facility] )    [address] [place] [address]   [phone]              Who to contact     If you have questions or need follow up information about today's clinic visit or your schedule please contact [facility] directly at [phone].  Normal or non-critical lab and imaging results will be communicated to you by [employer], letter or phone within 4 business days after the clinic has received the results. If you do not hear from us within 7 days, please contact the clinic through [employer] or phone. If you have a critical or abnormal lab result, we will notify you by phone as soon as possible.  Submit refill requests through [employer] or call your pharmacy and they will forward the refill request to us. Please allow 3 business days for your refill to be completed.          Additional Information About Your Visit        [employer] Information     [employer] gives you secure access to your electronic health record. If you see a primary care provider, you can also send messages to your care team and make appointments. If you have questions, please call your primary care  "clinic.  If you do not have a primary care provider, please call [phone] and they will assist you.        Care EveryWhere ID     This is your Care EveryWhere ID. This could be used by other organizations to access your [facility] medical records  [id number]        Your Vitals Were     Pulse Height Last Period BMI (Body Mass Index)          60 5' 6.25\" (1.683 m) (LMP Unknown) 33 kg/m2         Blood Pressure from Last 3 Encounters:   10/18/17 112/80   05/17/17 118/72   01/30/17 122/68    Weight from Last 3 Encounters:   10/18/17 206 lb (93.4 kg)   05/17/17 205 lb (93 kg)   01/30/17 201 lb (91.2 kg)              We Performed the Following     A pap thin layer screen with  HPV - recommended age 30 - 65 years (select HPV order below)     ADMIN INFLUENZA (For MEDICARE Patients ONLY) []     CBC with platelets differential     Comprehensive metabolic panel     Estimated Average Glucose     HC FLU VAC PRESRV FREE QUAD SPLIT VIR 3+YRS IM     Hemoglobin A1c     HPV High Risk Types DNA Cervical     TSH     XR Chest 2 Views        Primary Care Provider Office Phone # Fax #    Alvin Connolly [phone]       [facility] [address]  [place] [address]        Equal Access to Services     DENIS POOLE : Hadii aad ku hadasho Soomaali, waaxda luqadaha, qaybta kaalmada adeegyada, waxay idiin hayyadin francisco j morales ladorota . So [facility] [phone].    ATENCIÓN: Si habla español, tiene a rodriguez disposición servicios gratuitos de asistencia lingüística. Llame al [phone].    We comply with applicable federal civil rights laws and [place] laws. We do not discriminate on the basis of race, color, national origin, age, disability, sex, sexual orientation, or gender identity.            Thank you!     Thank you for choosing [facility] [place]  for your care. Our goal is always to provide you with excellent care. Hearing back from our patients is one way we can continue to improve our services. " Please take a few minutes to complete the written survey that you may receive in the mail after your visit with us. Thank you!             Your Updated Medication List - Protect others around you: Learn how to safely use, store and throw away your medicines at www.disposemymeds.org.          This list is accurate as of: 10/18/17  8:58 AM.  Always use your most recent med list.                   Brand Name Dispense Instructions for use Diagnosis    ACCU-CHEK SMARTVIEW test strip   Generic drug:  blood glucose monitoring     100 each    USE TO TEST BLOOD SUGARS THREE TIMES DAILY    Diabetes mellitus, type 2 (H)       blood glucose monitoring lancets     100 each    Please use fresh needle to check blood glucose three times a day    Diabetes mellitus, type 2 (H)       loratadine 10 MG tablet    CLARITIN     Take 10 mg by mouth daily as needed        Lutein 20 MG Caps      Take by mouth daily        metFORMIN 500 MG 24 hr tablet    GLUCOPHAGE-XR    270 tablet    TAKE 3 TABLETS BY MOUTH EMEKA LY WITH DINNER    Type 2 diabetes mellitus without complication, without long-term current use of insulin (H)       NONFORMULARY      Potassium 99 mg once daily        NUTRITIONAL SUPPLEMENT PO      Take 1 tablet by mouth        omeprazole 20 MG CR capsule    priLOSEC     Take by mouth daily        simvastatin 20 MG tablet    ZOCOR    90 tablet    TAKE 1 TABLET BY MOUTH ONCE DAILY AT BEDTIME    Mixed hyperlipidemia       sitagliptin 100 MG tablet    JANUVIA    90 tablet    Take 1 tablet (100 mg) by mouth daily    Type 2 diabetes mellitus without complication, without long-term current use of insulin (H)       tiZANidine 4 MG tablet    ZANAFLEX    30 tablet    Take 1 tablet (4 mg) by mouth 3 times daily as needed for muscle spasms    DDD (degenerative disc disease), lumbar       vitamin B complex with vitamin C Tabs tablet      Take 1 tablet by mouth daily

## 2017-10-18 NOTE — PROGRESS NOTES
SUBJECTIVE:   CC: Melba Hill is an 52 year old woman who presents for preventive health visit.     Healthy Habits:    Do you get at least three servings of calcium containing foods daily (dairy, green leafy vegetables, etc.)? yes    Amount of exercise or daily activities, outside of work: 3 day(s) per week    Problems taking medications regularly No    Medication side effects: No    Have you had an eye exam in the past two years? yes    Do you see a dentist twice per year? Yes    Do you have sleep apnea, excessive snoring or daytime drowsiness?no    Diabetes Follow-up    Patient is checking blood sugars: once daily.  Results are as follows:     Sporadically - highest is 152. Average 115-120.     Diabetic concerns: None     Symptoms of hypoglycemia (low blood sugar): none     Paresthesias (numbness or burning in feet) or sores: No     Date of last diabetic eye exam: 2017    Hyperlipidemia Follow-Up      Rate your low fat/cholesterol diet?: good    Taking statin?  Yes, no muscle aches from statin    Other lipid medications/supplements?:  None    Follows the diabetic diet.       Today's PHQ-2 Score: PHQ-2 ( 1999 Pfizer) 10/17/2017   Q1: Little interest or pleasure in doing things 0   Q2: Feeling down, depressed or hopeless 0   PHQ-2 Score 0   Q1: Little interest or pleasure in doing things Not at all   Q2: Feeling down, depressed or hopeless Not at all   PHQ-2 Score 0     PHQ-9 SCORE 12/20/2016 5/17/2017 10/18/2017   Total Score - - -   Total Score 0 3 5     AN-7 SCORE 12/20/2016 5/17/2017 10/18/2017   Total Score 0 0 3       Abuse: Current or Past(Physical, Sexual or Emotional)- No  Do you feel safe in your environment - Yes    Social History   Substance Use Topics     Smoking status: Former Smoker     Packs/day: 0.50     Years: 30.00     Types: Cigarettes     Quit date: 7/4/2013     Smokeless tobacco: Never Used      Comment: tried to quit, no passive exposure, longest tobacco free: 1 year     Alcohol use Yes       Comment: occasionally     The patient does not drink >3 drinks per day nor >7 drinks per week.    Reviewed orders with patient.  Reviewed health maintenance and updated orders accordingly - Yes  Labs reviewed in [facility]    Patient over age 50, mutual decision to screen reflected in health maintenance.    Pertinent mammograms are reviewed under the imaging tab.  History of abnormal Pap smear: YES - updated in Problem List and Health Maintenance accordingly    Reviewed and updated as needed this visit by clinical staffTobacco  Allergies  Meds  Med Hx  Surg Hx  Fam Hx  Soc Hx      Reviewed and updated as needed this visit by Provider        Past Medical History:   Diagnosis Date     Abnormal glandular Papanicolaou smear of cervix 2003     Cervical high risk HPV (human papillomavirus) test 2011     Diabetes mellitus (H)      Dysplasia of cervix (uteri) 2003     Esophageal reflux 2003     Hiatal hernia 2011     Hyperlipidemia 2011     JIMÉNEZ (nonalcoholic steatohepatitis) 2014     Tobacco abuse 2011    Quit 2013     Tobacco abuse, in remission       Past Surgical History:   Procedure Laterality Date     COLONOSCOPY  2014    Repeat in /Procedure: COLONOSCOPY;  Surgeon: Beba Sepulveda MD;  Location: [facility]     D & C      Missed      ESOPHAGOGASTRODUODENOSCOPY       exercise stress-negative       TUBAL LIGATION       No longer taking Icool - not helping with hot flashes. Would like alternative supplements to take.   Taking Omega 3 instead.   Last period was over 2+ years ago. No bleeding or spotting since.   No major health concerns. Mentioned some left knee pain with flexion but none with activity.  Does have anxiety when in large groups.       ROS:  C: NEGATIVE for fever, chills, change in weight  I: NEGATIVE for worrisome rashes, moles or lesions. POSITIVE for blister like lesions that pop up randomly on hair line and other places - more like a  "folliculitis.   E: NEGATIVE for vision changes or irritation  ENT: NEGATIVE for ear, mouth and throat problems  R: NEGATIVE for significant cough or SOB  B: NEGATIVE for masses, tenderness or discharge  CV: NEGATIVE for chest pain, palpitations or peripheral edema  GI: NEGATIVE for nausea, abdominal pain, heartburn, or change in bowel habits. POSITIVE for some issues with constipation.   : NEGATIVE for unusual urinary or vaginal symptoms. No vaginal bleeding.  M: NEGATIVE for significant arthralgias or myalgia.   N: NEGATIVE for weakness, dizziness or paresthesias  P: NEGATIVE for changes in mood or affect  Pt has long h/o social anxiety in larger or more crowded places.  Her dog helps this but recently has had issue taking small dog into few facilities - would like a letter.  Pt has not been on daily meds but issues happens very sporadically and in certain situations.           OBJECTIVE:   /80  Pulse 60  Ht 5' 6.25\" (1.683 m)  Wt 206 lb (93.4 kg)  LMP  (LMP Unknown)  BMI 33 kg/m2     EXAM:  GENERAL: healthy, alert and no distress  EYES: Eyes grossly normal to inspection, PERRL and conjunctivae and sclerae normal  HENT: ear canals and TM's normal, nose and mouth without ulcers or lesions  NECK: no adenopathy, no asymmetry, masses, or scars and thyroid normal to palpation  RESP: lungs clear to auscultation - no rales, rhonchi or wheezes  BREAST: normal without masses, tenderness or nipple discharge and no palpable axillary masses or adenopathy  CV: regular rate and rhythm, normal S1 S2, no S3 or S4, no murmur, click or rub, no peripheral edema and peripheral pulses strong  ABDOMEN: soft, nontender, no hepatosplenomegaly, no masses and bowel sounds normal   (female): normal female external genitalia, normal urethral meatus, vaginal mucosa pink, moist, well rugated, and normal cervix/adnexa/uterus without masses or discharge. Pap done.  MS: no gross musculoskeletal defects noted, no edema.  SKIN: no " suspicious lesions or rashes  NEURO: Normal strength and tone, mentation intact and speech normal  PSYCH: mentation appears normal, affect normal/bright      Results for orders placed or performed in visit on 10/18/17   XR Chest 2 Views    Narrative    PROCEDURE:  XR CHEST 2 VW    HISTORY:  Nicotine dependence, unspecified, in remission.     COMPARISON:  4/27/2015    FINDINGS:   The cardiac silhouette is normal in size. The pulmonary vasculature is  normal.  The lungs are clear. No pleural effusion or pneumothorax.      Impression    IMPRESSION:  No acute cardiopulmonary disease.      JENNI CORTES MD   CBC with platelets differential   Result Value Ref Range    WBC 9.4 4.0 - 11.0 10e9/L    RBC Count 4.77 3.8 - 5.2 10e12/L    Hemoglobin 14.6 11.7 - 15.7 g/dL    Hematocrit 43.3 35.0 - 47.0 %    MCV 91 78 - 100 fl    MCH 30.6 26.5 - 33.0 pg    MCHC 33.7 31.5 - 36.5 g/dL    RDW 13.1 10.0 - 15.0 %    Platelet Count 204 150 - 450 10e9/L    Diff Method Automated Method     % Neutrophils 55.1 %    % Lymphocytes 36.2 %    % Monocytes 5.6 %    % Eosinophils 2.2 %    % Basophils 0.7 %    % Immature Granulocytes 0.2 %    Nucleated RBCs 0 0 /100    Absolute Neutrophil 5.2 1.6 - 8.3 10e9/L    Absolute Lymphocytes 3.4 0.8 - 5.3 10e9/L    Absolute Monocytes 0.5 0.0 - 1.3 10e9/L    Absolute Eosinophils 0.2 0.0 - 0.7 10e9/L    Absolute Basophils 0.1 0.0 - 0.2 10e9/L    Abs Immature Granulocytes 0.0 0 - 0.4 10e9/L    Absolute Nucleated RBC 0.0    Comprehensive metabolic panel   Result Value Ref Range    Sodium 139 133 - 144 mmol/L    Potassium 3.9 3.4 - 5.3 mmol/L    Chloride 103 94 - 109 mmol/L    Carbon Dioxide 28 20 - 32 mmol/L    Anion Gap 8 3 - 14 mmol/L    Glucose 130 (H) 70 - 99 mg/dL    Urea Nitrogen 13 7 - 30 mg/dL    Creatinine 0.85 0.52 - 1.04 mg/dL    GFR Estimate 69 >60 mL/min/1.7m2    GFR Estimate If Black 84 >60 mL/min/1.7m2    Calcium 9.7 8.5 - 10.1 mg/dL    Bilirubin Total 1.0 0.2 - 1.3 mg/dL    Albumin 3.8 3.4 -  5.0 g/dL    Protein Total 8.5 6.8 - 8.8 g/dL    Alkaline Phosphatase 112 40 - 150 U/L    ALT 71 (H) 0 - 50 U/L    AST 60 (H) 0 - 45 U/L   Hemoglobin A1c   Result Value Ref Range    Hemoglobin A1C 6.8 (H) 4.3 - 6.0 %   TSH   Result Value Ref Range    TSH 2.09 0.40 - 4.00 mU/L   Estimated Average Glucose   Result Value Ref Range    Estimated Average Glucose 148 mg/dL       ASSESSMENT/PLAN:   (Z00.00) Routine general medical examination at a health care facility  (primary encounter diagnosis)  Comment: Yearly physical exam relatively unremarkable. Did pap smear - last one was 2 years ago but HPV testing was 5 years ago. F/u in one year.   Plan: Estimated Average Glucose, A pap thin layer         screen with  HPV - recommended age 30 - 65         years (select HPV order below), HPV High Risk         Types DNA Cervical           (R87.810) Cervical high risk HPV (human papillomavirus) test  Comment: Patient has hx of high risk HPV last screened in 2011. Discussed HPV infxn risk with the patient and she opted to do a pap with HPV screen today. Will call with results. If normal, f/u pap in 3 years.   Plan: A pap thin layer screen with  HPV - recommended        age 30 - 65 years (select HPV order below), HPV        High Risk Types DNA Cervical            (E11.9) Type 2 diabetes mellitus without complication, without long-term current use of insulin (H)  Comment: Stable. CBC, TSH, CMP wnl. A1C 6.8. F/u in 4 months.   Plan: CBC with platelets differential, Comprehensive         metabolic panel, Hemoglobin A1c, TSH,         OFFICE/OUTPT VISIT,EST,LEVL III            (E78.2) Mixed hyperlipidemia  Comment: CMP wnl. Lipids checked 5/17 wnl.   Plan: Comprehensive metabolic panel, OFFICE/OUTPT         VISIT,EST,LEVL III            (K75.81) JIMÉNEZ (nonalcoholic steatohepatitis)  Comment: Stable. Mildly elevated transaminases. Will continue to follow.   Plan: Comprehensive metabolic panel            (F17.201) Tobacco abuse, in  "remission  Comment: Hx of smoking and in her 50s so higher risk of cancer. CXR unremarkable. Repeat in 1 year.   Plan: XR Chest 2 Views            (Z23) Need for prophylactic vaccination and inoculation against influenza  Comment: Received.   Plan: HC FLU VAC PRESRV FREE QUAD SPLIT VIR 3+YRS IM,        ADMIN INFLUENZA (For MEDICARE Patients ONLY)         []            (Z12.31) Encounter for screening mammogram for breast cancer  Comment: Discussed the importance of screening mammograms and the current guidelines of q1-2 years for her age. Recommened yearly mammogram but patient prefers q2 years. Last mammogram was 2016.   Plan:  - mammogram in 2018.     (F40.10) Social anxiety disorder  Comment: Has had increasing anxiety in large crowds. Has a small Pomeranian dog that helps keep her calm. Doesn't have issues at home. Is having some difficulty bringing her dog into different places. She feels she does not need medication as she can keep her anxiety under control with the help of her dog. Filled out a letter for her to have a comfort dog.   Plan: OFFICE/OUTPT VISIT,SEMAJ GONSALEZ III              COUNSELING:   Reviewed preventive health counseling, as reflected in patient instructions       Regular exercise       Healthy diet/nutrition     reports that she quit smoking about 4 years ago. Her smoking use included Cigarettes. She has a 15.00 pack-year smoking history. She has never used smokeless tobacco.    Estimated body mass index is 33 kg/(m^2) as calculated from the following:    Height as of this encounter: 5' 6.25\" (1.683 m).    Weight as of this encounter: 206 lb (93.4 kg).     Weight management plan: Discussed healthy diet and exercise guidelines and patient will follow up in 12 months in clinic to re-evaluate.    Counseling Resources:  ATP IV Guidelines  Pooled Cohorts Equation Calculator  Breast Cancer Risk Calculator  FRAX Risk Assessment  ICSI Preventive Guidelines  Dietary Guidelines for Americans, " 2010  [employer] MyPlate  ASA Prophylaxis  Lung CA Screening    Plan was discussed with the patient and she was in agreement. All questions answered.       Alvin Connolly MD  [facility] [place]        Injectable Influenza Immunization Documentation    1.  Is the person to be vaccinated sick today?   No    2. Does the person to be vaccinated have an allergy to a component   of the vaccine?   No    3. Has the person to be vaccinated ever had a serious reaction   to influenza vaccine in the past?   No    4. Has the person to be vaccinated ever had Guillain-Barré syndrome?   No    Form completed by Vikas Leggett

## 2017-10-18 NOTE — NURSING NOTE
"Chief Complaint   Patient presents with     Physical       Initial /80  Pulse 60  Ht 5' 6.25\" (1.683 m)  Wt 206 lb (93.4 kg)  LMP  (LMP Unknown)  BMI 33 kg/m2 Estimated body mass index is 33 kg/(m^2) as calculated from the following:    Height as of this encounter: 5' 6.25\" (1.683 m).    Weight as of this encounter: 206 lb (93.4 kg).  Medication Reconciliation: complete     Vikas Leggett      "

## 2017-10-18 NOTE — PATIENT INSTRUCTIONS

## 2017-10-19 ASSESSMENT — ANXIETY QUESTIONNAIRES: GAD7 TOTAL SCORE: 3

## 2017-10-24 LAB
COPATH REPORT: NORMAL
PAP: NORMAL

## 2017-10-25 LAB
FINAL DIAGNOSIS: NORMAL
HPV HR 12 DNA CVX QL NAA+PROBE: NEGATIVE
HPV16 DNA SPEC QL NAA+PROBE: NEGATIVE
HPV18 DNA SPEC QL NAA+PROBE: NEGATIVE
SPECIMEN DESCRIPTION: NORMAL

## 2017-11-28 DIAGNOSIS — E11.9 TYPE 2 DIABETES MELLITUS WITHOUT COMPLICATION, WITHOUT LONG-TERM CURRENT USE OF INSULIN (H): ICD-10-CM

## 2017-11-29 NOTE — TELEPHONE ENCOUNTER
Januvia      Last Written Prescription Date: 5/17/17  Last Fill Quantity: 90,  # refills: 1   Last Office Visit with [facility], [facility] or [facility] prescribing provider: 10/18/17                                             Metformin      Last Written Prescription Date: 5/17/17  Last Fill Quantity: 270,  # refills: 1   Last Office Visit with [facility], [facility] or [facility] prescribing provider: 10/18/17

## 2017-12-01 RX ORDER — METFORMIN HCL 500 MG
TABLET, EXTENDED RELEASE 24 HR ORAL
Qty: 270 TABLET | Refills: 1 | Status: SHIPPED | OUTPATIENT
Start: 2017-12-01 | End: 2018-05-27

## 2017-12-01 RX ORDER — SITAGLIPTIN 100 MG/1
TABLET, FILM COATED ORAL
Qty: 90 TABLET | Refills: 1 | Status: SHIPPED | OUTPATIENT
Start: 2017-12-01 | End: 2018-05-27

## 2018-03-20 ENCOUNTER — OFFICE VISIT (OUTPATIENT)
Dept: FAMILY MEDICINE | Facility: OTHER | Age: 53
End: 2018-03-20
Attending: FAMILY MEDICINE
Payer: COMMERCIAL

## 2018-03-20 VITALS
WEIGHT: 204 LBS | HEART RATE: 60 BPM | HEIGHT: 66 IN | BODY MASS INDEX: 32.78 KG/M2 | TEMPERATURE: 96.5 F | SYSTOLIC BLOOD PRESSURE: 102 MMHG | DIASTOLIC BLOOD PRESSURE: 64 MMHG

## 2018-03-20 DIAGNOSIS — M51.369 DDD (DEGENERATIVE DISC DISEASE), LUMBAR: ICD-10-CM

## 2018-03-20 DIAGNOSIS — E11.9 TYPE 2 DIABETES MELLITUS WITHOUT COMPLICATION, WITHOUT LONG-TERM CURRENT USE OF INSULIN (H): Primary | ICD-10-CM

## 2018-03-20 LAB
ANION GAP SERPL CALCULATED.3IONS-SCNC: 8 MMOL/L (ref 3–14)
BUN SERPL-MCNC: 17 MG/DL (ref 7–30)
CALCIUM SERPL-MCNC: 9.5 MG/DL (ref 8.5–10.1)
CHLORIDE SERPL-SCNC: 105 MMOL/L (ref 94–109)
CO2 SERPL-SCNC: 25 MMOL/L (ref 20–32)
CREAT SERPL-MCNC: 0.77 MG/DL (ref 0.52–1.04)
EST. AVERAGE GLUCOSE BLD GHB EST-MCNC: 126 MG/DL
GFR SERPL CREATININE-BSD FRML MDRD: 78 ML/MIN/1.7M2
GLUCOSE SERPL-MCNC: 133 MG/DL (ref 70–99)
HBA1C MFR BLD: 6 % (ref 4.3–6)
POTASSIUM SERPL-SCNC: 3.6 MMOL/L (ref 3.4–5.3)
SODIUM SERPL-SCNC: 138 MMOL/L (ref 133–144)

## 2018-03-20 PROCEDURE — 36415 COLL VENOUS BLD VENIPUNCTURE: CPT | Performed by: FAMILY MEDICINE

## 2018-03-20 PROCEDURE — 99213 OFFICE O/P EST LOW 20 MIN: CPT | Performed by: FAMILY MEDICINE

## 2018-03-20 PROCEDURE — 83036 HEMOGLOBIN GLYCOSYLATED A1C: CPT | Performed by: FAMILY MEDICINE

## 2018-03-20 PROCEDURE — 80048 BASIC METABOLIC PNL TOTAL CA: CPT | Performed by: FAMILY MEDICINE

## 2018-03-20 RX ORDER — OMEGA-3-ACID ETHYL ESTERS 1 G/1
2 CAPSULE, LIQUID FILLED ORAL 2 TIMES DAILY
COMMUNITY

## 2018-03-20 ASSESSMENT — ANXIETY QUESTIONNAIRES
7. FEELING AFRAID AS IF SOMETHING AWFUL MIGHT HAPPEN: NOT AT ALL
IF YOU CHECKED OFF ANY PROBLEMS ON THIS QUESTIONNAIRE, HOW DIFFICULT HAVE THESE PROBLEMS MADE IT FOR YOU TO DO YOUR WORK, TAKE CARE OF THINGS AT HOME, OR GET ALONG WITH OTHER PEOPLE: SOMEWHAT DIFFICULT
4. TROUBLE RELAXING: SEVERAL DAYS
5. BEING SO RESTLESS THAT IT IS HARD TO SIT STILL: NOT AT ALL
2. NOT BEING ABLE TO STOP OR CONTROL WORRYING: SEVERAL DAYS
6. BECOMING EASILY ANNOYED OR IRRITABLE: SEVERAL DAYS
3. WORRYING TOO MUCH ABOUT DIFFERENT THINGS: MORE THAN HALF THE DAYS
1. FEELING NERVOUS, ANXIOUS, OR ON EDGE: MORE THAN HALF THE DAYS
GAD7 TOTAL SCORE: 7

## 2018-03-20 ASSESSMENT — PAIN SCALES - GENERAL: PAINLEVEL: NO PAIN (0)

## 2018-03-20 NOTE — PATIENT INSTRUCTIONS
without comp... Order: [id number]             Ref Range & Units 8:23 AM   5mo ago   10mo ago        Estimated Average Glucose mg/dL 126 148 146     Resulting [facility]          Specimen Collected: 03/20/18  8:23 AM Last Resulted: 03/20/18  8:57 AM                                 Hemoglobin A1c   Status:  Final result   Visible to patient:  No (Not Released) Dx:  Type 2 diabetes mellitus without comp... Order: [id number]             Ref Range & Units 8:23 AM   5mo ago   10mo ago        Hemoglobin A1C 4.3 - 6.0 % 6.0 6.8 (H) 6.7 (H)     Resulting [facility]          Specimen Collected: 03/20/18  8:23 AM Last Resulted: 03/20/18  8:50 AM                                Basic metabolic panel   Status:  Final result   Visible to patient:  No (Not Released) Dx:  Type 2 diabetes mellitus without comp... Order: [id number]             Ref Range & Units 8:23 AM   5mo ago   10mo ago        Sodium 133 - 144 mmol/L 138 139 142      Potassium 3.4 - 5.3 mmol/L 3.6 3.9 4.0      Chloride 94 - 109 mmol/L 105 103 105      Carbon Dioxide 20 - 32 mmol/L 25 28 27      Anion Gap 3 - 14 mmol/L 8 8 10      Glucose 70 - 99 mg/dL 133 (H) 130 (H) (H)CM      Urea Nitrogen 7 - 30 mg/dL 17 13 17      Creatinine 0.52 - 1.04 mg/dL 0.77 0.85 0.86      GFR Estimate >60 mL/min/1.7m2 78 69CM 68CM     Comments: Non  GFR Calc      GFR Estimate If Black >60 mL/min/1.7m2 >90 84CM 83CM     Comments:  GFR Calc      Calcium 8.5 - 10.1 mg/dL 9.5 9.7 9.5     Resulting [facility]          Specimen Collected: 03/20/18  8:23 AM Last Resulted: 03/20/18  8:45 AM                CM=Additional comments

## 2018-03-20 NOTE — MR AVS SNAPSHOT
After Visit Summary   3/20/2018    Melba Hill    MRN: [id number]           Patient Information     Date Of Birth          1965        Visit Information        Provider Department      3/20/2018 8:45 AM Alvin Connolly MD [facility] [place]        Today's Diagnoses     Type 2 diabetes mellitus without complication, without long-term current use of insulin (H)    -  1    Microalbuminuria        DDD (degenerative disc disease), lumbar          Care Instructions    without comp... Order: [id number]             Ref Range & Units 8:23 AM   5mo ago   10mo ago        Estimated Average Glucose mg/dL 126 148 146     Resulting [facility]          Specimen Collected: 03/20/18  8:23 AM Last Resulted: 03/20/18  8:57 AM                                 Hemoglobin A1c   Status:  Final result   Visible to patient:  No (Not Released) Dx:  Type 2 diabetes mellitus without comp... Order: [id number]             Ref Range & Units 8:23 AM   5mo ago   10mo ago        Hemoglobin A1C 4.3 - 6.0 % 6.0 6.8 (H) 6.7 (H)     Resulting [facility]          Specimen Collected: 03/20/18  8:23 AM Last Resulted: 03/20/18  8:50 AM                                Basic metabolic panel   Status:  Final result   Visible to patient:  No (Not Released) Dx:  Type 2 diabetes mellitus without comp... Order: [id number]             Ref Range & Units 8:23 AM   5mo ago   10mo ago        Sodium 133 - 144 mmol/L 138 139 142      Potassium 3.4 - 5.3 mmol/L 3.6 3.9 4.0      Chloride 94 - 109 mmol/L 105 103 105      Carbon Dioxide 20 - 32 mmol/L 25 28 27      Anion Gap 3 - 14 mmol/L 8 8 10      Glucose 70 - 99 mg/dL 133 (H) 130 (H) (H)CM      Urea Nitrogen 7 - 30 mg/dL 17 13 17      Creatinine 0.52 - 1.04 mg/dL 0.77 0.85 0.86      GFR Estimate >60 mL/min/1.7m2 78 69CM 68CM     Comments: Non  GFR Calc      GFR Estimate If Black >60 mL/min/1.7m2 >90 84CM 83CM     Comments:  GFR Calc      Calcium 8.5  "- 10.1 mg/dL 9.5 9.7 9.5     [facility]          Specimen Collected: 03/20/18  8:23 AM Last Resulted: 03/20/18  8:45 AM                CM=Additional comments                                Follow-ups after your visit        Your next 10 appointments already scheduled     Aug 21, 2018  8:30 AM CDT   (Arrive by 8:15 AM)   SHORT with Alvin Connolly MD   [facility] [place] ([facility] [place] )    [address]  [place] [address]   [phone]              Who to contact     If you have questions or need follow up information about today's clinic visit or your schedule please contact [facility] directly at [phone].  Normal or non-critical lab and imaging results will be communicated to you by MyChart, letter or phone within 4 business days after the clinic has received the results. If you do not hear from us within 7 days, please contact the clinic through [facility] or phone. If you have a critical or abnormal lab result, we will notify you by phone as soon as possible.  Submit refill requests through [email or web address] or call your pharmacy and they will forward the refill request to us. Please allow 3 business days for your refill to be completed.          Additional Information About Your Visit        [facility] Information     [email or web address] gives you secure access to your electronic health record. If you see a primary care provider, you can also send messages to your care team and make appointments. If you have questions, please call your primary care clinic.  If you do not have a primary care provider, please call [phone] and they will assist you.        Care EveryWhere ID     This is your Care EveryWhere ID. This could be used by other organizations to access your [facility] medical records  [id number]        Your Vitals Were     Pulse Temperature Height Last Period BMI (Body Mass Index)       60 96.5  F (35.8  C) 5' 6.25\" (1.683 m) (LMP Unknown) 32.68 kg/m2        Blood " Pressure from Last 3 Encounters:   03/20/18 102/64   10/18/17 112/80   05/17/17 118/72    Weight from Last 3 Encounters:   03/20/18 204 lb (92.5 kg)   10/18/17 206 lb (93.4 kg)   05/17/17 205 lb (93 kg)              We Performed the Following     Basic metabolic panel     C FOOT EXAM  NO CHARGE     Estimated Average Glucose     Hemoglobin A1c          Today's Medication Changes          These changes are accurate as of 3/20/18  9:13 AM.  If you have any questions, ask your nurse or doctor.               Stop taking these medicines if you haven't already. Please contact your care team if you have questions.     NUTRITIONAL SUPPLEMENT PO   Stopped by:  Alvin Connolly MD                Where to get your medicines      These medications were sent to [id number] - [place] - [address]  [address] [facility] [address]     Phone:  [phone]     tiZANidine 4 MG tablet                Primary Care Provider Office Phone # Fax #    Alvin Connolly [phone] [address] [address]        Equal Access to Services     [facility]: Hadii radha ku hadasho Soomaali, waaxda luqadaha, qaybta kaalmada mello, trenton altamirano . So [facility] [phone].    ATENCIÓN: Si habla español, tiene a rodriguez disposición servicios gratuitos de asistencia lingüística. [facility] [phone].    We comply with applicable federal civil rights laws and [place] laws. We do not discriminate on the basis of race, color, national origin, age, disability, sex, sexual orientation, or gender identity.            Thank you!     Thank you for choosing [facility]  for your care. Our goal is always to provide you with excellent care. Hearing back from our patients is one way we can continue to improve our services. Please take a few minutes to complete the written survey that you may receive in the mail after your visit with us. Thank you!             Your  Updated Medication List - Protect others around you: Learn how to safely use, store and throw away your medicines at www.disposemymeds.org.          This list is accurate as of 3/20/18  9:13 AM.  Always use your most recent med list.                   Brand Name Dispense Instructions for use Diagnosis    ACCU-CHEK SMARTVIEW test strip   Generic drug:  blood glucose monitoring     100 each    USE TO TEST BLOOD SUGARS THREE TIMES DAILY    Diabetes mellitus, type 2 (H)       blood glucose monitoring lancets     100 each    Please use fresh needle to check blood glucose three times a day    Diabetes mellitus, type 2 (H)       JANUVIA 100 MG tablet   Generic drug:  sitagliptin     90 tablet    TAKE 1 TABLET DAILY BY MOUTH    Type 2 diabetes mellitus without complication, without long-term current use of insulin (H)       loratadine 10 MG tablet    CLARITIN     Take 10 mg by mouth daily as needed        Lutein 20 MG Caps      Take by mouth daily        MAGNESIUM OXIDE PO      Take by mouth daily        metFORMIN 500 MG 24 hr tablet    GLUCOPHAGE-XR    270 tablet    TAKE 3 TABLETS BY MOUTH DAILY WITH DINNER    Type 2 diabetes mellitus without complication, without long-term current use of insulin (H)       * NONFORMULARY      Potassium 99 mg once daily        * NONFORMULARY      Tumeric        omega-3 acid ethyl esters 1 G capsule    Lovaza     Take 2 g by mouth 2 times daily        omeprazole 20 MG CR capsule    priLOSEC     Take by mouth daily        simvastatin 20 MG tablet    ZOCOR    90 tablet    TAKE 1 TABLET BY MOUTH ONCE DAILY AT BEDTIME    Mixed hyperlipidemia       tiZANidine 4 MG tablet    ZANAFLEX    30 tablet    Take 1 tablet (4 mg) by mouth 3 times daily as needed for muscle spasms    DDD (degenerative disc disease), lumbar       vitamin B complex with vitamin C Tabs tablet      Take 1 tablet by mouth daily        VITAMIN D (CHOLECALCIFEROL) PO      Take 1,000 Units by mouth daily        * Notice:  This list  has 2 medication(s) that are the same as other medications prescribed for you. Read the directions carefully, and ask your doctor or other care provider to review them with you.

## 2018-03-20 NOTE — NURSING NOTE
"Chief Complaint   Patient presents with     Diabetes       Initial /64  Pulse 60  Temp 96.5  F (35.8  C)  Ht 5' 6.25\" (1.683 m)  Wt 204 lb (92.5 kg)  LMP  (LMP Unknown)  BMI 32.68 kg/m2 Estimated body mass index is 32.68 kg/(m^2) as calculated from the following:    Height as of this encounter: 5' 6.25\" (1.683 m).    Weight as of this encounter: 204 lb (92.5 kg).  Medication Reconciliation: complete     Vikas Leggett      "

## 2018-03-20 NOTE — PROGRESS NOTES
SUBJECTIVE:   Melba Hill is a 53 year old female who presents to clinic today for the following health issues:      Diabetes Follow-up    Patient is checking blood sugars: once daily.  Results are as follows:         am - 109    Diabetic concerns: None     Symptoms of hypoglycemia (low blood sugar): shaky, dizzy, weak     Paresthesias (numbness or burning in feet) or sores: No     Date of last diabetic eye exam: 6/17    Hyperlipidemia Follow-Up      Rate your low fat/cholesterol diet?: good    Taking statin?  Yes, no muscle aches from statin    Other lipid medications/supplements?:  Fish oil/Omega 3, dose 1000 without side effects    Hypertension Follow-up      Outpatient blood pressures are not being checked.    Low Salt Diet: no added salt    BP Readings from Last 2 Encounters:   10/18/17 112/80   05/17/17 118/72     Hemoglobin A1C (%)   Date Value   03/20/2018 6.0   10/18/2017 6.8 (H)     LDL Cholesterol Calculated (mg/dL)   Date Value   05/17/2017 52   04/18/2016 51       Amount of exercise or physical activity: 6-7 days/week for an average of not outside of work    Problems taking medications regularly: No    Medication side effects: none    Diet: regular (no restrictions)    1. foot deformity   No  2. Current or previous foot ulceration     No  3. Current or previous pre-ulcerative calluses     Yes  4. previous partial amputation of one or both feet or complete amputation of one foot     No  5. peripheral neuropathy with evidence of callus formation     No  6. poor circulation     No            Problem list and histories reviewed & adjusted, as indicated.  Additional history: as documented    Labs reviewed in EPIC    Reviewed and updated as needed this visit by clinical staff       Reviewed and updated as needed this visit by Provider         ROS:  CONSTITUTIONAL: NEGATIVE for fever, chills, change in weight  ENT/MOUTH: NEGATIVE for ear, mouth and throat problems  RESP: NEGATIVE for significant cough or  "SOB  CV: NEGATIVE for chest pain, palpitations or peripheral edema    OBJECTIVE:                                                    /64  Pulse 60  Temp 96.5  F (35.8  C)  Ht 5' 6.25\" (1.683 m)  Wt 204 lb (92.5 kg)  LMP  (LMP Unknown)  BMI 32.68 kg/m2  Body mass index is 32.68 kg/(m^2).   GENERAL: healthy, alert, well nourished, well hydrated, no distress  NECK: no tenderness, no adenopathy, no asymmetry, no masses, no stiffness; thyroid- normal to palpation  RESP: lungs clear to auscultation - no rales, no rhonchi, no wheezes  CV: regular rates and rhythm, normal S1 S2, no S3 or S4 and no murmur, no click or rub -  ABDOMEN: soft, no tenderness, no  hepatosplenomegaly, no masses, normal bowel sounds  Diabetic foot exam: normal DP and PT pulses, no trophic changes or ulcerative lesions, normal sensory exam and normal monofilament exam    Results for orders placed or performed in visit on 03/20/18 (from the past 24 hour(s))   Basic metabolic panel   Result Value Ref Range    Sodium 138 133 - 144 mmol/L    Potassium 3.6 3.4 - 5.3 mmol/L    Chloride 105 94 - 109 mmol/L    Carbon Dioxide 25 20 - 32 mmol/L    Anion Gap 8 3 - 14 mmol/L    Glucose 133 (H) 70 - 99 mg/dL    Urea Nitrogen 17 7 - 30 mg/dL    Creatinine 0.77 0.52 - 1.04 mg/dL    GFR Estimate 78 >60 mL/min/1.7m2    GFR Estimate If Black >90 >60 mL/min/1.7m2    Calcium 9.5 8.5 - 10.1 mg/dL   Hemoglobin A1c   Result Value Ref Range    Hemoglobin A1C 6.0 4.3 - 6.0 %   Estimated Average Glucose   Result Value Ref Range    Estimated Average Glucose 126 mg/dL          ASSESSMENT/PLAN:                                                    1. Type 2 diabetes mellitus without complication, without long-term current use of insulin (H)  Awesome control.  Discussed. Continue current medications and behavioral changes.   F/u fasting in 5 months   - Basic metabolic panel; Future  - Hemoglobin A1c; Future  - Basic metabolic panel  - Hemoglobin A1c  - Estimated Average " Glucose  - C FOOT EXAM  NO CHARGE      2. DDD (degenerative disc disease), lumbar  Needs RF - done today  - tiZANidine (ZANAFLEX) 4 MG tablet; Take 1 tablet (4 mg) by mouth 3 times daily as needed for muscle spasms  Dispense: 30 tablet; Refill: 2      See Patient Instructions    Alvin Connolly MD  [facility]

## 2018-03-21 ASSESSMENT — PATIENT HEALTH QUESTIONNAIRE - PHQ9: SUM OF ALL RESPONSES TO PHQ QUESTIONS 1-9: 4

## 2018-03-21 ASSESSMENT — ANXIETY QUESTIONNAIRES: GAD7 TOTAL SCORE: 7

## 2018-06-12 ENCOUNTER — HOSPITAL ENCOUNTER (EMERGENCY)
Facility: HOSPITAL | Age: 53
Discharge: HOME | End: 2018-06-12
Attending: PHYSICIAN ASSISTANT | Admitting: PHYSICIAN ASSISTANT
Payer: COMMERCIAL

## 2018-06-12 VITALS
DIASTOLIC BLOOD PRESSURE: 94 MMHG | RESPIRATION RATE: 14 BRPM | SYSTOLIC BLOOD PRESSURE: 151 MMHG | TEMPERATURE: 96.9 F | OXYGEN SATURATION: 98 %

## 2018-06-12 DIAGNOSIS — Z13.9 SCREENING FOR CONDITION: ICD-10-CM

## 2018-06-12 DIAGNOSIS — S05.02XA ABRASION OF LEFT CORNEA, INITIAL ENCOUNTER: ICD-10-CM

## 2018-06-12 DIAGNOSIS — H10.32 ACUTE BACTERIAL CONJUNCTIVITIS OF LEFT EYE: ICD-10-CM

## 2018-06-12 PROCEDURE — G0463 HOSPITAL OUTPT CLINIC VISIT: HCPCS

## 2018-06-12 PROCEDURE — 99213 OFFICE O/P EST LOW 20 MIN: CPT | Performed by: PHYSICIAN ASSISTANT

## 2018-06-12 RX ORDER — NEOMYCIN SULFATE, POLYMYXIN B SULFATE AND DEXAMETHASONE 3.5; 10000; 1 MG/ML; [USP'U]/ML; MG/ML
1-2 SUSPENSION/ DROPS OPHTHALMIC 4 TIMES DAILY
Qty: 1 BOTTLE | Refills: 0 | Status: SHIPPED | OUTPATIENT
Start: 2018-06-12 | End: 2019-02-13

## 2018-06-12 ASSESSMENT — ENCOUNTER SYMPTOMS
NEUROLOGICAL NEGATIVE: 1
RESPIRATORY NEGATIVE: 1
EYE DISCHARGE: 1
EYE PAIN: 0
EYE REDNESS: 1
EYE ITCHING: 0
CONSTITUTIONAL NEGATIVE: 1

## 2018-06-12 NOTE — ED AVS SNAPSHOT
[facility]    [address] [address]    Phone:  [phone]                                       Melba Hill   MRN: [id number]    Department:  [facility]   Date of Visit:  6/12/2018           After Visit Summary Signature Page     I have received my discharge instructions, and my questions have been answered. I have discussed any challenges I see with this plan with the nurse or doctor.    ..........................................................................................................................................  Patient/Patient Representative Signature      ..........................................................................................................................................  Patient Representative Print Name and Relationship to Patient    ..................................................               ................................................  Date                                            Time    ..........................................................................................................................................  Reviewed by Signature/Title    ...................................................              ..............................................  Date                                                            Time

## 2018-06-12 NOTE — ED AVS SNAPSHOT
[facility]    [address] [place] [address]    Phone:  [phone]                                       Melba Hill   MRN: [id number]    Department:  [facility]   Date of Visit:  6/12/2018           Patient Information     Date Of Birth          1965        Your diagnoses for this visit were:     Abrasion of left cornea, initial encounter     Acute bacterial conjunctivitis of left eye     Screening for condition Left eye negative for ferning       You were seen by Maria Alejandra Naylor PA.      Follow-up Information     Follow up In 1 day.    Why:  With an Eye Specialist if your symptoms increase        Follow up with [facility].    Specialty:  EMERGENCY MEDICINE    Why:  If further concerns develop    Contact information:    [address] [place] [address] [phone]    Additional information:    From [facility]: Take [address]. Turn left at [address]. Turn left at the first stoplight on [address]. At the first stop sign, take a right onto [address]. Take a left into the parking lot and continue through until you reach the North enterance of the building.       From [place]: Take [address]. Take the [address] ramp towards [place]. Turn left onto [address]. Take a slight right onto [address] [facility]. Turn left at the first stoplight on [address]. At the first stop sign, take a right onto [address]. Take a left into the parking lot and continue through until you reach the North enterance of the building.       From [place]: Take [address]. Take a right at [address]. At the first stop sign, take a right onto [address]. Take a left into the parking lot and continue through until you reach the North enterance of the building.       Discharge References/Attachments     ABRASION, CORNEAL (ENGLISH)    CONJUNCTIVITIS, NONSPECIFIC (ENGLISH)      Your  next 10 appointments already scheduled     Aug 21, 2018  8:30 AM CDT   (Arrive by 8:15 AM)   SHORT with Alvin Connolly MD   [facility] [place] ([facility] [place] )    [address]  [place] [address]   [phone]                 Review of your medicines      START taking        Dose / Directions Last dose taken    neomycin-polymyxin-dexamethasone 3.5-06405-2.1 Susp ophthalmic susp   Commonly known as:  MAXITROL   Dose:  1-2 drop   Quantity:  1 Bottle        Place 1-2 drops Into the left eye 4 times daily for 5 days   Refills:  0          Our records show that you are taking the medicines listed below. If these are incorrect, please call your family doctor or clinic.        Dose / Directions Last dose taken    ACCU-CHEK SMARTVIEW test strip   Quantity:  100 each   Generic drug:  blood glucose monitoring        USE TO TEST BLOOD SUGARS THREE TIMES DAILY   Refills:  11        blood glucose monitoring lancets   Quantity:  100 each        Please use fresh needle to check blood glucose three times a day   Refills:  12        JANUVIA 100 MG tablet   Quantity:  90 tablet   Generic drug:  sitagliptin        TAKE 1 TABLET DAILY BY MOUTH   Refills:  1        loratadine 10 MG tablet   Commonly known as:  CLARITIN   Dose:  10 mg        Take 10 mg by mouth daily as needed   Refills:  0        Lutein 20 MG Caps        Take by mouth daily   Refills:  0        MAGNESIUM OXIDE PO        Take by mouth daily   Refills:  0        metFORMIN 500 MG 24 hr tablet   Commonly known as:  GLUCOPHAGE-XR   Quantity:  270 tablet        TAKE 3 TABLETS BY MOUTH DAILY WITH DINNER   Refills:  0        * NONFORMULARY        Potassium 99 mg once daily   Refills:  0        * NONFORMULARY        Tumeric   Refills:  0        omega-3 acid ethyl esters 1 g capsule   Commonly known as:  Lovaza   Dose:  2 g        Take 2 g by mouth 2 times daily   Refills:  0        omeprazole 20 MG CR capsule   Commonly known as:  priLOSEC         Take by mouth daily   Refills:  0        simvastatin 20 MG tablet   Commonly known as:  ZOCOR   Quantity:  90 tablet        TAKE 1 TABLET BY MOUTH AT BEDTIME - GENERIC FOR ZOCOR   Refills:  1        tiZANidine 4 MG tablet   Commonly known as:  ZANAFLEX   Dose:  4 mg   Quantity:  30 tablet        Take 1 tablet (4 mg) by mouth 3 times daily as needed for muscle spasms   Refills:  2        vitamin B complex with vitamin C Tabs tablet   Dose:  1 tablet        Take 1 tablet by mouth daily   Refills:  0        VITAMIN D (CHOLECALCIFEROL) PO   Dose:  1000 Units        Take 1,000 Units by mouth daily   Refills:  0        * Notice:  This list has 2 medication(s) that are the same as other medications prescribed for you. Read the directions carefully, and ask your doctor or other care provider to review them with you.            Prescriptions were sent or printed at these locations (1 Prescription)                   [id number] - [place] - [address]   [address] [place] [address]    Telephone:  [phone]   Fax:  [phone]   Hours:                  E-Prescribed (1 of 1)         neomycin-polymyxin-dexamethasone (MAXITROL) 3.5-39824-3.1 SUSP ophthalmic susp                Orders Needing Specimen Collection     None      Pending Results     No orders found from 6/10/2018 to 6/13/2018.            Pending Culture Results     No orders found from 6/10/2018 to 6/13/2018.            Thank you for choosing [facility]       Thank you for choosing [facility] for your care. Our goal is always to provide you with excellent care. Hearing back from our patients is one way we can continue to improve our services. Please take a few minutes to complete the written survey that you may receive in the mail after you visit with us. Thank you!        [employer] Information     [facility] gives you secure access to your electronic health record. If you see a primary care provider, you can also send messages to your care  team and make appointments. If you have questions, please call your primary care clinic.  If you do not have a primary care provider, please call [phone] and they will assist you.        Care EveryWhere ID     This is your Care EveryWhere ID. This could be used by other organizations to access your [facility] medical records  [id number]        Equal Access to Services     DENIS POOLE : Javy Gomes, linnette buchanan, trenton ma. So [facility] [phone].    ATENCIÓN: Si habla español, tiene a rodriguez disposición servicios gratuitos de asistencia lingüística. Llame al [phone].    We comply with applicable federal civil rights laws and [place] laws. We do not discriminate on the basis of race, color, national origin, age, disability, sex, sexual orientation, or gender identity.            After Visit Summary       This is your record. Keep this with you and show to your community pharmacist(s) and doctor(s) at your next visit.

## 2018-06-12 NOTE — ED PROVIDER NOTES
History     Chief Complaint   Patient presents with     Conjunctivitis     lt eye     The history is provided by the patient. No  was used.     Melba Hill is a 53 year old female who has one day of left eye crusting, redness and irritated. No eye pain. No lid swelling. No change in vision. Denies direct injury or known FB.    Problem List:    Patient Active Problem List    Diagnosis Date Noted     Social anxiety disorder 10/18/2017     Priority: Medium     ACP (advance care planning) 01/09/2017     Priority: Medium     Advance Care Planning 1/9/2017: ACP Review of Chart / Resources Provided:  Reviewed chart for advance care plan.  Melba Hill has no plan or code status on file. Discussed available resources and provided with information. Confirmed code status reflects current choices pending further ACP discussions.  Confirmed/documented legally designated decision makers.  Added by Ronna Fitzgerald             Type 2 diabetes mellitus without complication, without long-term current use of insulin (H) 12/20/2016     Priority: Medium     Microalbuminuria 12/20/2016     Priority: Medium     DDD (degenerative disc disease), lumbar 04/18/2016     Priority: Medium     Mixed hyperlipidemia 01/21/2016     Priority: Medium     Tobacco abuse, in remission      Priority: Medium     JIMÉNEZ (nonalcoholic steatohepatitis) 08/20/2014     Priority: Medium     Cervical high risk HPV (human papillomavirus) test 11/17/2011     Priority: Medium        Past Medical History:    Past Medical History:   Diagnosis Date     Abnormal glandular Papanicolaou smear of cervix 11/21/2003     Cervical high risk HPV (human papillomavirus) test 11/17/2011     Diabetes mellitus (H)      Dysplasia of cervix (uteri) 12/29/2003     Esophageal reflux 09/09/2003     Hiatal hernia 11/17/2011     Hyperlipidemia 11/17/2011     JIMÉNEZ (nonalcoholic steatohepatitis) 8/20/2014     Tobacco abuse 11/17/2011     Tobacco abuse, in remission         Past Surgical History:    Past Surgical History:   Procedure Laterality Date     COLONOSCOPY  2014    Repeat in /Procedure: COLONOSCOPY;  Surgeon: Beba Sepulveda MD;  Location: [facility]     D & C      Missed      ESOPHAGOGASTRODUODENOSCOPY       exercise stress-negative       LEEP TX, CERVICAL  2003??    Dr Irving     TUBAL LIGATION         Family History:    Family History   Problem Relation Age of Onset     CANCER Maternal Aunt      lung     DIABETES Mother      HEART DISEASE Mother      murmur     Other - See Comments Maternal Uncle      myocardial infarction     DIABETES Maternal Grandmother        Social History:  Marital Status:   [2]  Social History   Substance Use Topics     Smoking status: Former Smoker     Packs/day: 0.50     Years: 30.00     Types: Cigarettes     Quit date: 2013     Smokeless tobacco: Never Used      Comment: tried to quit, no passive exposure, longest tobacco free: 1 year     Alcohol use Yes      Comment: occasionally        Medications:      neomycin-polymyxin-dexamethasone (MAXITROL) 3.5-03820-1.1 SUSP ophthalmic susp   ACCU-CHEK FASTCLIX LANCETS MISC   ACCU-CHEK SMARTVIEW test strip   JANUVIA 100 MG tablet   loratadine (CLARITIN) 10 MG tablet   Lutein 20 MG CAPS   MAGNESIUM OXIDE PO   metFORMIN (GLUCOPHAGE-XR) 500 MG 24 hr tablet   NONFORMULARY   NONFORMULARY   omega-3 acid ethyl esters (LOVAZA) 1 G capsule   omeprazole (PRILOSEC) 20 MG capsule   simvastatin (ZOCOR) 20 MG tablet   tiZANidine (ZANAFLEX) 4 MG tablet   vitamin  B complex with vitamin C (VITAMIN  B COMPLEX) TABS   VITAMIN D, CHOLECALCIFEROL, PO         Review of Systems   Constitutional: Negative.    HENT: Negative.    Eyes: Positive for discharge and redness. Negative for pain, itching and visual disturbance.   Respiratory: Negative.    Neurological: Negative.        Physical Exam   BP: 151/94  Heart Rate: 94  Temp: 96.9  F (36.1  C)  Resp: 14  SpO2: 98 %      Physical Exam    Constitutional: She appears well-developed and well-nourished. No distress.   Eyes: EOM are normal. Left eye exhibits discharge and exudate. No foreign body present in the left eye. Left conjunctiva is injected.   Cardiovascular: Normal rate.    Pulmonary/Chest: Effort normal.   Neurological: She is alert.   Skin: She is not diaphoretic.   Psychiatric: She has a normal mood and affect.   Nursing note and vitals reviewed.      ED Course     ED Course     Procedures        left eye examined under magnification, with fluorescein stain and wood's lamp.  No ulcer/FB/ferning. Small 1 mm x 1 mm abrasion in center.     Assessments & Plan (with Medical Decision Making)     I have reviewed the nursing notes.    I have reviewed the findings, diagnosis, plan and need for follow up with the patient.      Discharge Medication List as of 6/12/2018  4:49 PM      START taking these medications    Details   neomycin-polymyxin-dexamethasone (MAXITROL) 3.5-28944-1.1 SUSP ophthalmic susp Place 1-2 drops Into the left eye 4 times daily for 5 days, Disp-1 Bottle, R-0, E-Prescribe             Final diagnoses:   Abrasion of left cornea, initial encounter   Acute bacterial conjunctivitis of left eye   Screening for condition - Left eye negative for ferning         Wash hands and glasses often  Patient verbally educated and given appropriate education sheets for the diagnoses and has no questions.  Take medications as directed.   Follow up with and Eye Specialist if s/s increase or if not resolving in next 2 days.   if further concerns develop, return to the ER  Maria Alejandra Naylor Certified  Physician Assistant  6/12/2018  7:56 PM  URGENT CARE CLINIC      6/12/2018   [facility]     Maria Alejandra Naylor PA  06/12/18 1957

## 2018-06-14 ENCOUNTER — TELEPHONE (OUTPATIENT)
Dept: FAMILY MEDICINE | Facility: OTHER | Age: 53
End: 2018-06-14

## 2018-06-14 NOTE — TELEPHONE ENCOUNTER
Was seen the ED other day for pink eye and started drops this morning woke up and now is in other eye. Would like note for work.

## 2018-06-14 NOTE — LETTER
June 14, 2018      Melba Hill  [address] [facility] [address]        To Whom It May Concern:    Melba Hill  was seen on 6/12/18.  Please excuse her  until 6/15/18 due to illness.        Sincerely,        Alvin Connolly MD

## 2018-08-22 ENCOUNTER — OFFICE VISIT (OUTPATIENT)
Dept: FAMILY MEDICINE | Facility: OTHER | Age: 53
End: 2018-08-22
Attending: FAMILY MEDICINE
Payer: COMMERCIAL

## 2018-08-22 VITALS
SYSTOLIC BLOOD PRESSURE: 134 MMHG | WEIGHT: 206 LBS | TEMPERATURE: 97.3 F | OXYGEN SATURATION: 98 % | BODY MASS INDEX: 33 KG/M2 | HEART RATE: 72 BPM | DIASTOLIC BLOOD PRESSURE: 82 MMHG

## 2018-08-22 DIAGNOSIS — Z12.39 SCREENING BREAST EXAMINATION: Primary | ICD-10-CM

## 2018-08-22 DIAGNOSIS — E78.2 MIXED HYPERLIPIDEMIA: ICD-10-CM

## 2018-08-22 DIAGNOSIS — K75.81 NASH (NONALCOHOLIC STEATOHEPATITIS): ICD-10-CM

## 2018-08-22 DIAGNOSIS — E11.9 TYPE 2 DIABETES MELLITUS WITHOUT COMPLICATION, WITHOUT LONG-TERM CURRENT USE OF INSULIN (H): Primary | ICD-10-CM

## 2018-08-22 LAB
ALBUMIN SERPL-MCNC: 3.7 G/DL (ref 3.4–5)
ALP SERPL-CCNC: 111 U/L (ref 40–150)
ALT SERPL W P-5'-P-CCNC: 65 U/L (ref 0–50)
ANION GAP SERPL CALCULATED.3IONS-SCNC: 8 MMOL/L (ref 3–14)
AST SERPL W P-5'-P-CCNC: 56 U/L (ref 0–45)
BILIRUB SERPL-MCNC: 0.8 MG/DL (ref 0.2–1.3)
BUN SERPL-MCNC: 19 MG/DL (ref 7–30)
CALCIUM SERPL-MCNC: 9 MG/DL (ref 8.5–10.1)
CHLORIDE SERPL-SCNC: 108 MMOL/L (ref 94–109)
CHOLEST SERPL-MCNC: 132 MG/DL
CO2 SERPL-SCNC: 24 MMOL/L (ref 20–32)
CREAT SERPL-MCNC: 0.76 MG/DL (ref 0.52–1.04)
EST. AVERAGE GLUCOSE BLD GHB EST-MCNC: 154 MG/DL
GFR SERPL CREATININE-BSD FRML MDRD: 79 ML/MIN/1.7M2
GLUCOSE SERPL-MCNC: 118 MG/DL (ref 70–99)
HBA1C MFR BLD: 7 % (ref 0–5.6)
HDLC SERPL-MCNC: 42 MG/DL
LDLC SERPL CALC-MCNC: 66 MG/DL
NONHDLC SERPL-MCNC: 90 MG/DL
POTASSIUM SERPL-SCNC: 4.1 MMOL/L (ref 3.4–5.3)
PROT SERPL-MCNC: 8 G/DL (ref 6.8–8.8)
SODIUM SERPL-SCNC: 140 MMOL/L (ref 133–144)
TRIGL SERPL-MCNC: 121 MG/DL

## 2018-08-22 PROCEDURE — 83036 HEMOGLOBIN GLYCOSYLATED A1C: CPT | Performed by: FAMILY MEDICINE

## 2018-08-22 PROCEDURE — 99214 OFFICE O/P EST MOD 30 MIN: CPT | Performed by: FAMILY MEDICINE

## 2018-08-22 PROCEDURE — 36415 COLL VENOUS BLD VENIPUNCTURE: CPT | Performed by: FAMILY MEDICINE

## 2018-08-22 PROCEDURE — 80053 COMPREHEN METABOLIC PANEL: CPT | Performed by: FAMILY MEDICINE

## 2018-08-22 PROCEDURE — 80061 LIPID PANEL: CPT | Performed by: FAMILY MEDICINE

## 2018-08-22 ASSESSMENT — PAIN SCALES - GENERAL: PAINLEVEL: NO PAIN (0)

## 2018-08-22 ASSESSMENT — ANXIETY QUESTIONNAIRES
4. TROUBLE RELAXING: MORE THAN HALF THE DAYS
GAD7 TOTAL SCORE: 12
3. WORRYING TOO MUCH ABOUT DIFFERENT THINGS: MORE THAN HALF THE DAYS
2. NOT BEING ABLE TO STOP OR CONTROL WORRYING: MORE THAN HALF THE DAYS
7. FEELING AFRAID AS IF SOMETHING AWFUL MIGHT HAPPEN: SEVERAL DAYS
6. BECOMING EASILY ANNOYED OR IRRITABLE: MORE THAN HALF THE DAYS
1. FEELING NERVOUS, ANXIOUS, OR ON EDGE: MORE THAN HALF THE DAYS
5. BEING SO RESTLESS THAT IT IS HARD TO SIT STILL: SEVERAL DAYS
IF YOU CHECKED OFF ANY PROBLEMS ON THIS QUESTIONNAIRE, HOW DIFFICULT HAVE THESE PROBLEMS MADE IT FOR YOU TO DO YOUR WORK, TAKE CARE OF THINGS AT HOME, OR GET ALONG WITH OTHER PEOPLE: SOMEWHAT DIFFICULT

## 2018-08-22 NOTE — PROGRESS NOTES
SUBJECTIVE:                                                    Melba Hill is a 53 year old female who presents to clinic today for the following health issues:      Diabetes Follow-up      Patient is checking blood sugars: 1 times a week    Diabetic concerns: low blood sugar several less than 70 in the past few weeks but did not check. Could tell she was low and just ate something     Symptoms of hypoglycemia (low blood sugar): woozy      Paresthesias (numbness or burning in feet) or sores: No     Date of last diabetic eye exam: 6-26-17    BP Readings from Last 2 Encounters:   06/12/18 151/94   03/20/18 102/64     Hemoglobin A1C (%)   Date Value   03/20/2018 6.0   10/18/2017 6.8 (H)     LDL Cholesterol Calculated (mg/dL)   Date Value   05/17/2017 52   04/18/2016 51       Diabetes Management Resources  Hyperlipidemia Follow-Up      Rate your low fat/cholesterol diet?: good    Taking statin?  Yes, no muscle aches from statin    Other lipid medications/supplements?:  Fish oil/Omega 3, dose 1 g without side effects      Amount of exercise or physical activity: 4-5 days/week for an average of greater than 60 minutes. Stays busy with works    Problems taking medications regularly: No    Medication side effects: none    Diet: diabetic and carbohydrate counting    Pt also has JIMÉNEZ - trying to keep wt down and watch fatty foods    Problem list and histories reviewed & adjusted, as indicated.  Additional history: as documented    Labs reviewed in EPIC    ROS:  CONSTITUTIONAL: NEGATIVE for fever, chills, change in weight  ENT/MOUTH: NEGATIVE for ear, mouth and throat problems  RESP: NEGATIVE for significant cough or SOB  CV: NEGATIVE for chest pain, palpitations or peripheral edema  ROS otherwise negative    OBJECTIVE:                                                    /82  Pulse 72  Temp 97.3  F (36.3  C) (Tympanic)  Wt 206 lb (93.4 kg)  LMP  (LMP Unknown)  SpO2 98%  BMI 33 kg/m2  Body mass index is 33  kg/(m^2).   GENERAL: healthy, alert, well nourished, well hydrated, no distress  NECK: no tenderness, no adenopathy, no asymmetry, no masses, no stiffness; thyroid- normal to palpation  RESP: lungs clear to auscultation - no rales, no rhonchi, no wheezes  CV: regular rates and rhythm, normal S1 S2, no S3 or S4 and no murmur, no click or rub -  ABDOMEN: soft, no tenderness, no  hepatosplenomegaly, no masses, normal bowel sounds  MS: extremities- no gross deformities noted, no edema    Results for orders placed or performed in visit on 08/22/18 (from the past 24 hour(s))   Comprehensive metabolic panel   Result Value Ref Range    Sodium 140 133 - 144 mmol/L    Potassium 4.1 3.4 - 5.3 mmol/L    Chloride 108 94 - 109 mmol/L    Carbon Dioxide 24 20 - 32 mmol/L    Anion Gap 8 3 - 14 mmol/L    Glucose 118 (H) 70 - 99 mg/dL    Urea Nitrogen 19 7 - 30 mg/dL    Creatinine 0.76 0.52 - 1.04 mg/dL    GFR Estimate 79 >60 mL/min/1.7m2    GFR Estimate If Black >90 >60 mL/min/1.7m2    Calcium 9.0 8.5 - 10.1 mg/dL    Bilirubin Total 0.8 0.2 - 1.3 mg/dL    Albumin 3.7 3.4 - 5.0 g/dL    Protein Total 8.0 6.8 - 8.8 g/dL    Alkaline Phosphatase 111 40 - 150 U/L    ALT 65 (H) 0 - 50 U/L    AST 56 (H) 0 - 45 U/L   Lipid Profile   Result Value Ref Range    Cholesterol 132 <200 mg/dL    Triglycerides 121 <150 mg/dL    HDL Cholesterol 42 (L) >49 mg/dL    LDL Cholesterol Calculated 66 <100 mg/dL    Non HDL Cholesterol 90 <130 mg/dL   Hemoglobin A1c   Result Value Ref Range    Hemoglobin A1C 7.0 (H) 0 - 5.6 %   Estimated Average Glucose   Result Value Ref Range    Estimated Average Glucose 154 mg/dL          ASSESSMENT/PLAN:                                                    1. Type 2 diabetes mellitus without complication, without long-term current use of insulin (H)  A1C - increase stress and stress eating. Discussed. Needs eye exam . F/u in 4 months   - Comprehensive metabolic panel; Future  - Lipid Profile; Future  - Hemoglobin A1c;  Future  - Albumin Random Urine Quantitative with Creat Ratio; Future  - Comprehensive metabolic panel  - Lipid Profile  - Hemoglobin A1c  - Albumin Random Urine Quantitative with Creat Ratio  - Estimated Average Glucose    2. Mixed hyperlipidemia  On statin - Continue current medications and behavioral changes.   - Comprehensive metabolic panel; Future  - Lipid Profile; Future  - Hemoglobin A1c; Future  - Albumin Random Urine Quantitative with Creat Ratio; Future  - Comprehensive metabolic panel  - Lipid Profile  - Hemoglobin A1c  - Albumin Random Urine Quantitative with Creat Ratio    3. JIMÉNEZ (nonalcoholic steatohepatitis)  Mild LFT.  - will watch.    - Comprehensive metabolic panel; Future  - Lipid Profile; Future  - Hemoglobin A1c; Future  - Albumin Random Urine Quantitative with Creat Ratio; Future  - Comprehensive metabolic panel  - Lipid Profile  - Hemoglobin A1c  - Albumin Random Urine Quantitative with Creat Ratio          Alvin Connolly MD  [facility]

## 2018-08-22 NOTE — MR AVS SNAPSHOT
"              After Visit Summary   8/22/2018    Melba Hill    MRN: [id number]           Patient Information     Date Of Birth          1965        Visit Information        Provider Department      8/22/2018 8:30 AM Alvin Connolly MD [facility]        Today's Diagnoses     Type 2 diabetes mellitus without complication, without long-term current use of insulin (H)    -  1    Mixed hyperlipidemia        JIMÉNEZ (nonalcoholic steatohepatitis)           Follow-ups after your visit        Your next 10 appointments already scheduled     Dec 19, 2018  8:00 AM CST   (Arrive by 7:45 AM)   PHYSICAL with Alvin Connolly MD   [facility] [place] ([facility] [place] )    [address]  [place] [address]   [phone]            Dec 19, 2018  9:30 AM CST   (Arrive by 9:15 AM)   MA SCREENING BILATERAL W/ LEA with HCMA1   [facility] ([facility] [place] )    [address]  [place] [address]   [phone]           Three-dimensional (3D) mammograms are available at [place] locations in [facility], [place], [place], [facility], [place], [place], and [place]. [facility] locations include [place] and [facility] in [place]. Benefits of 3D mammograms include: - Improved rate of cancer detection - Decreases your chance of having to go back for more tests, which means fewer: - \"False-positive\" results (This means that there is an abnormal area but it isn't cancer.) - Invasive testing procedures, such as a biopsy or surgery - Can provide clearer images of the breast if you have dense breast tissue. 3D mammography is an optional exam that anyone can have with a 2D mammogram. It doesn't replace or take the place of a 2D mammogram. 2D mammograms remain an effective screening test for all women.  Not all insurance companies cover the cost of a 3D mammogram. Check with your insurance.              Future tests that were " ordered for you today     Open Future Orders        Priority Expected Expires Ordered    MA Screen Bilateral w/Miguel Angel Routine  8/22/2019 8/22/2018            Who to contact     If you have questions or need follow up information about today's clinic visit or your schedule please contact [facility] directly at [phone].  Normal or non-critical lab and imaging results will be communicated to you by MyChart, letter or phone within 4 business days after the clinic has received the results. If you do not hear from us within 7 days, please contact the clinic through [facility] or phone. If you have a critical or abnormal lab result, we will notify you by phone as soon as possible.  Submit refill requests through Site9 or call your pharmacy and they will forward the refill request to us. Please allow 3 business days for your refill to be completed.          Additional Information About Your Visit        MyChart Information     Site9 gives you secure access to your electronic health record. If you see a primary care provider, you can also send messages to your care team and make appointments. If you have questions, please call your primary care clinic.  If you do not have a primary care provider, please call [phone] and they will assist you.        Care EveryWhere ID     This is your Care EveryWhere ID. This could be used by other organizations to access your [place] medical records  [id number]        Your Vitals Were     Pulse Temperature Last Period Pulse Oximetry BMI (Body Mass Index)       72 97.3  F (36.3  C) (Tympanic) (LMP Unknown) 98% 33 kg/m2        Blood Pressure from Last 3 Encounters:   08/22/18 134/82   06/12/18 151/94   03/20/18 102/64    Weight from Last 3 Encounters:   08/22/18 206 lb (93.4 kg)   03/20/18 204 lb (92.5 kg)   10/18/17 206 lb (93.4 kg)              We Performed the Following     Comprehensive metabolic panel     Estimated Average Glucose     Hemoglobin A1c     Lipid  Profile        Primary Care Provider Office Phone # Fax #    Alvin Connolly [phone]       [facility] [address]        Equal Access to Services     AMYRADHA ZUELMA : Javy radha bo catie Gomes, waitaloda luqadaha, qaybta kaalmada mello, trenton sanramona sharee. So [facility] [phone].    ATENCIÓN: Si habla español, tiene a rodriguez disposición servicios gratuitos de asistencia lingüística. Llame al [phone].    We comply with applicable federal civil rights laws and [place] laws. We do not discriminate on the basis of race, color, national origin, age, disability, sex, sexual orientation, or gender identity.            Thank you!     Thank you for choosing [facility]  for your care. Our goal is always to provide you with excellent care. Hearing back from our patients is one way we can continue to improve our services. Please take a few minutes to complete the written survey that you may receive in the mail after your visit with us. Thank you!             Your Updated Medication List - Protect others around you: Learn how to safely use, store and throw away your medicines at www.disposemymeds.org.          This list is accurate as of 8/22/18  9:14 AM.  Always use your most recent med list.                   Brand Name Dispense Instructions for use Diagnosis    ACCU-CHEK SMARTVIEW test strip   Generic drug:  blood glucose monitoring     100 each    USE TO TEST BLOOD SUGARS THREE TIMES DAILY    Diabetes mellitus, type 2 (H)       blood glucose monitoring lancets     100 each    Please use fresh needle to check blood glucose three times a day    Diabetes mellitus, type 2 (H)       JANUVIA 100 MG tablet   Generic drug:  sitagliptin     90 tablet    TAKE 1 TABLET DAILY BY MOUTH    Type 2 diabetes mellitus without complication, without long-term current use of insulin (H)       loratadine 10 MG tablet    CLARITIN     Take 10 mg by mouth daily as needed         Lutein 20 MG Caps      Take by mouth daily        MAGNESIUM OXIDE PO      Take by mouth daily        metFORMIN 500 MG 24 hr tablet    GLUCOPHAGE-XR    270 tablet    TAKE 3 TABLETS BY MOUTH DAILY WITH DINNER    Type 2 diabetes mellitus without complication, without long-term current use of insulin (H)       * NONFORMULARY      Potassium 99 mg once daily        * NONFORMULARY      Tumeric        omega-3 acid ethyl esters 1 g capsule    Lovaza     Take 2 g by mouth 2 times daily        omeprazole 20 MG CR capsule    priLOSEC     Take by mouth daily        simvastatin 20 MG tablet    ZOCOR    90 tablet    TAKE 1 TABLET BY MOUTH AT BEDTIME - GENERIC FOR ZOCOR    Mixed hyperlipidemia       tiZANidine 4 MG tablet    ZANAFLEX    30 tablet    Take 1 tablet (4 mg) by mouth 3 times daily as needed for muscle spasms    DDD (degenerative disc disease), lumbar       vitamin B complex with vitamin C Tabs tablet      Take 1 tablet by mouth daily        VITAMIN D (CHOLECALCIFEROL) PO      Take 1,000 Units by mouth daily        * Notice:  This list has 2 medication(s) that are the same as other medications prescribed for you. Read the directions carefully, and ask your doctor or other care provider to review them with you.

## 2018-08-22 NOTE — NURSING NOTE
"Chief Complaint   Patient presents with     Diabetes     Lipids       Initial /82  Pulse 72  Temp 97.3  F (36.3  C) (Tympanic)  Wt 206 lb (93.4 kg)  LMP  (LMP Unknown)  SpO2 98%  BMI 33 kg/m2 Estimated body mass index is 33 kg/(m^2) as calculated from the following:    Height as of 3/20/18: 5' 6.25\" (1.683 m).    Weight as of this encounter: 206 lb (93.4 kg).  Medication Reconciliation: complete    Christi King MA    "

## 2018-08-24 ASSESSMENT — PATIENT HEALTH QUESTIONNAIRE - PHQ9: SUM OF ALL RESPONSES TO PHQ QUESTIONS 1-9: 5

## 2018-08-24 ASSESSMENT — ANXIETY QUESTIONNAIRES: GAD7 TOTAL SCORE: 12

## 2018-08-31 DIAGNOSIS — E11.9 TYPE 2 DIABETES MELLITUS WITHOUT COMPLICATION, WITHOUT LONG-TERM CURRENT USE OF INSULIN (H): ICD-10-CM

## 2018-08-31 NOTE — TELEPHONE ENCOUNTER
Metformin   Last Office Visit: 8/22/2018  Last Refill Date:5/29/2018  # [phone]          Refills  0      Thank you!

## 2018-09-05 RX ORDER — METFORMIN HCL 500 MG
TABLET, EXTENDED RELEASE 24 HR ORAL
Qty: 270 TABLET | Refills: 1 | Status: SHIPPED | OUTPATIENT
Start: 2018-09-05 | End: 2019-02-02
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
## 2018-12-12 NOTE — PATIENT INSTRUCTIONS
Preventive Health Recommendations  Female Ages 50 - 64    Yearly exam: See your health care provider every year in order to  o Review health changes.   o Discuss preventive care.    o Review your medicines if your doctor has prescribed any.      Get a Pap test every three years (unless you have an abnormal result and your provider advises testing more often).    If you get Pap tests with HPV test, you only need to test every 5 years, unless you have an abnormal result.     You do not need a Pap test if your uterus was removed (hysterectomy) and you have not had cancer.    You should be tested each year for STDs (sexually transmitted diseases) if you're at risk.     Have a mammogram every 1 to 2 years.    Have a colonoscopy at age 50, or have a yearly FIT test (stool test). These exams screen for colon cancer.      Have a cholesterol test every 5 years, or more often if advised.    Have a diabetes test (fasting glucose) every three years. If you are at risk for diabetes, you should have this test more often.     If you are at risk for osteoporosis (brittle bone disease), think about having a bone density scan (DEXA).    Shots: Get a flu shot each year. Get a tetanus shot every 10 years.    Nutrition:     Eat at least 5 servings of fruits and vegetables each day.    Eat whole-grain bread, whole-wheat pasta and brown rice instead of white grains and rice.    Get adequate Calcium and Vitamin D.     Lifestyle    Exercise at least 150 minutes a week (30 minutes a day, 5 days a week). This will help you control your weight and prevent disease.    Limit alcohol to one drink per day.    No smoking.     Wear sunscreen to prevent skin cancer.     See your dentist every six months for an exam and cleaning.    See your eye doctor every 1 to 2 years.    Results for orders placed or performed in visit on 12/19/18   XR Chest 2 Views    Narrative    PROCEDURE: XR CHEST 2 VW 12/19/2018 8:07 AM    HISTORY: Tobacco abuse, in  remission    COMPARISONS: 10/18/2017.    TECHNIQUE: 2 views.    FINDINGS: Heart and pulmonary vasculature are normal. Lungs are clear  and no pleural effusion is seen. Mild degenerative changes seen in the  spine.         Impression    IMPRESSION: No acute disease.    BK CORONA MD   Comprehensive metabolic panel   Result Value Ref Range    Sodium 140 133 - 144 mmol/L    Potassium 3.8 3.4 - 5.3 mmol/L    Chloride 106 94 - 109 mmol/L    Carbon Dioxide 26 20 - 32 mmol/L    Anion Gap 8 3 - 14 mmol/L    Glucose 113 (H) 70 - 99 mg/dL    Urea Nitrogen 20 7 - 30 mg/dL    Creatinine 0.79 0.52 - 1.04 mg/dL    GFR Estimate 84 >60 mL/min/[1.73_m2]    GFR Estimate If Black >90 >60 mL/min/[1.73_m2]    Calcium 8.7 8.5 - 10.1 mg/dL    Bilirubin Total 0.7 0.2 - 1.3 mg/dL    Albumin 3.8 3.4 - 5.0 g/dL    Protein Total 7.6 6.8 - 8.8 g/dL    Alkaline Phosphatase 115 40 - 150 U/L    ALT 43 0 - 50 U/L    AST 32 0 - 45 U/L   CBC with platelets differential   Result Value Ref Range    WBC 8.2 4.0 - 11.0 10e9/L    RBC Count 4.39 3.8 - 5.2 10e12/L    Hemoglobin 13.1 11.7 - 15.7 g/dL    Hematocrit 39.4 35.0 - 47.0 %    MCV 90 78 - 100 fl    MCH 29.8 26.5 - 33.0 pg    MCHC 33.2 31.5 - 36.5 g/dL    RDW 13.2 10.0 - 15.0 %    Platelet Count 197 150 - 450 10e9/L    Diff Method Automated Method     % Neutrophils 56.3 %    % Lymphocytes 33.4 %    % Monocytes 6.5 %    % Eosinophils 2.6 %    % Basophils 1.1 %    % Immature Granulocytes 0.1 %    Nucleated RBCs 0 0 /100    Absolute Neutrophil 4.6 1.6 - 8.3 10e9/L    Absolute Lymphocytes 2.7 0.8 - 5.3 10e9/L    Absolute Monocytes 0.5 0.0 - 1.3 10e9/L    Absolute Eosinophils 0.2 0.0 - 0.7 10e9/L    Absolute Basophils 0.1 0.0 - 0.2 10e9/L    Abs Immature Granulocytes 0.0 0 - 0.4 10e9/L    Absolute Nucleated RBC 0.0    TSH   Result Value Ref Range    TSH 1.15 0.40 - 4.00 mU/L   Hemoglobin A1c   Result Value Ref Range    Hemoglobin A1C 6.7 (H) 0 - 5.6 %   Estimated Average Glucose   Result Value Ref  Range    Estimated Average Glucose 146 mg/dL

## 2018-12-19 ENCOUNTER — ANCILLARY PROCEDURE (OUTPATIENT)
Dept: GENERAL RADIOLOGY | Facility: OTHER | Age: 53
End: 2018-12-19
Attending: FAMILY MEDICINE
Payer: COMMERCIAL

## 2018-12-19 ENCOUNTER — OFFICE VISIT (OUTPATIENT)
Dept: FAMILY MEDICINE | Facility: OTHER | Age: 53
End: 2018-12-19
Attending: FAMILY MEDICINE
Payer: COMMERCIAL

## 2018-12-19 ENCOUNTER — ANCILLARY PROCEDURE (OUTPATIENT)
Dept: MAMMOGRAPHY | Facility: OTHER | Age: 53
End: 2018-12-19
Attending: FAMILY MEDICINE
Payer: COMMERCIAL

## 2018-12-19 VITALS
BODY MASS INDEX: 32.78 KG/M2 | HEIGHT: 66 IN | OXYGEN SATURATION: 98 % | TEMPERATURE: 96.7 F | HEART RATE: 64 BPM | DIASTOLIC BLOOD PRESSURE: 72 MMHG | SYSTOLIC BLOOD PRESSURE: 128 MMHG | WEIGHT: 204 LBS

## 2018-12-19 DIAGNOSIS — K75.81 NASH (NONALCOHOLIC STEATOHEPATITIS): ICD-10-CM

## 2018-12-19 DIAGNOSIS — E78.2 MIXED HYPERLIPIDEMIA: ICD-10-CM

## 2018-12-19 DIAGNOSIS — E11.9 TYPE 2 DIABETES MELLITUS WITHOUT COMPLICATION, WITHOUT LONG-TERM CURRENT USE OF INSULIN (H): Primary | ICD-10-CM

## 2018-12-19 DIAGNOSIS — Z00.00 ROUTINE HISTORY AND PHYSICAL EXAMINATION OF ADULT: ICD-10-CM

## 2018-12-19 DIAGNOSIS — F17.201 TOBACCO ABUSE, IN REMISSION: ICD-10-CM

## 2018-12-19 DIAGNOSIS — R87.810 CERVICAL HIGH RISK HUMAN PAPILLOMAVIRUS (HPV) DNA TEST POSITIVE: ICD-10-CM

## 2018-12-19 DIAGNOSIS — N89.4: ICD-10-CM

## 2018-12-19 DIAGNOSIS — N89.8 VAGINAL ITCHING: ICD-10-CM

## 2018-12-19 DIAGNOSIS — Z12.39 SCREENING BREAST EXAMINATION: ICD-10-CM

## 2018-12-19 LAB
ALBUMIN SERPL-MCNC: 3.8 G/DL (ref 3.4–5)
ALP SERPL-CCNC: 115 U/L (ref 40–150)
ALT SERPL W P-5'-P-CCNC: 43 U/L (ref 0–50)
ANION GAP SERPL CALCULATED.3IONS-SCNC: 8 MMOL/L (ref 3–14)
AST SERPL W P-5'-P-CCNC: 32 U/L (ref 0–45)
BASOPHILS # BLD AUTO: 0.1 10E9/L (ref 0–0.2)
BASOPHILS NFR BLD AUTO: 1.1 %
BILIRUB SERPL-MCNC: 0.7 MG/DL (ref 0.2–1.3)
BUN SERPL-MCNC: 20 MG/DL (ref 7–30)
CALCIUM SERPL-MCNC: 8.7 MG/DL (ref 8.5–10.1)
CHLORIDE SERPL-SCNC: 106 MMOL/L (ref 94–109)
CO2 SERPL-SCNC: 26 MMOL/L (ref 20–32)
CREAT SERPL-MCNC: 0.79 MG/DL (ref 0.52–1.04)
CREAT UR-MCNC: 131 MG/DL
DIFFERENTIAL METHOD BLD: NORMAL
EOSINOPHIL # BLD AUTO: 0.2 10E9/L (ref 0–0.7)
EOSINOPHIL NFR BLD AUTO: 2.6 %
ERYTHROCYTE [DISTWIDTH] IN BLOOD BY AUTOMATED COUNT: 13.2 % (ref 10–15)
EST. AVERAGE GLUCOSE BLD GHB EST-MCNC: 146 MG/DL
GFR SERPL CREATININE-BSD FRML MDRD: 84 ML/MIN/{1.73_M2}
GLUCOSE SERPL-MCNC: 113 MG/DL (ref 70–99)
HBA1C MFR BLD: 6.7 % (ref 0–5.6)
HCT VFR BLD AUTO: 39.4 % (ref 35–47)
HGB BLD-MCNC: 13.1 G/DL (ref 11.7–15.7)
IMM GRANULOCYTES # BLD: 0 10E9/L (ref 0–0.4)
IMM GRANULOCYTES NFR BLD: 0.1 %
LYMPHOCYTES # BLD AUTO: 2.7 10E9/L (ref 0.8–5.3)
LYMPHOCYTES NFR BLD AUTO: 33.4 %
MCH RBC QN AUTO: 29.8 PG (ref 26.5–33)
MCHC RBC AUTO-ENTMCNC: 33.2 G/DL (ref 31.5–36.5)
MCV RBC AUTO: 90 FL (ref 78–100)
MICROALBUMIN UR-MCNC: 54 MG/L
MICROALBUMIN/CREAT UR: 41.6 MG/G CR (ref 0–25)
MONOCYTES # BLD AUTO: 0.5 10E9/L (ref 0–1.3)
MONOCYTES NFR BLD AUTO: 6.5 %
NEUTROPHILS # BLD AUTO: 4.6 10E9/L (ref 1.6–8.3)
NEUTROPHILS NFR BLD AUTO: 56.3 %
NRBC # BLD AUTO: 0 10*3/UL
NRBC BLD AUTO-RTO: 0 /100
PLATELET # BLD AUTO: 197 10E9/L (ref 150–450)
POTASSIUM SERPL-SCNC: 3.8 MMOL/L (ref 3.4–5.3)
PROT SERPL-MCNC: 7.6 G/DL (ref 6.8–8.8)
RBC # BLD AUTO: 4.39 10E12/L (ref 3.8–5.2)
SODIUM SERPL-SCNC: 140 MMOL/L (ref 133–144)
SPECIMEN SOURCE: NORMAL
TSH SERPL DL<=0.005 MIU/L-ACNC: 1.15 MU/L (ref 0.4–4)
WBC # BLD AUTO: 8.2 10E9/L (ref 4–11)
WET PREP SPEC: NORMAL

## 2018-12-19 PROCEDURE — 82043 UR ALBUMIN QUANTITATIVE: CPT | Performed by: FAMILY MEDICINE

## 2018-12-19 PROCEDURE — 71046 X-RAY EXAM CHEST 2 VIEWS: CPT | Mod: TC

## 2018-12-19 PROCEDURE — 87210 SMEAR WET MOUNT SALINE/INK: CPT | Performed by: FAMILY MEDICINE

## 2018-12-19 PROCEDURE — 80050 GENERAL HEALTH PANEL: CPT | Performed by: FAMILY MEDICINE

## 2018-12-19 PROCEDURE — 36415 COLL VENOUS BLD VENIPUNCTURE: CPT | Performed by: FAMILY MEDICINE

## 2018-12-19 PROCEDURE — 77067 SCR MAMMO BI INCL CAD: CPT | Mod: TC

## 2018-12-19 PROCEDURE — 83036 HEMOGLOBIN GLYCOSYLATED A1C: CPT | Performed by: FAMILY MEDICINE

## 2018-12-19 PROCEDURE — 99396 PREV VISIT EST AGE 40-64: CPT | Performed by: FAMILY MEDICINE

## 2018-12-19 ASSESSMENT — ANXIETY QUESTIONNAIRES
5. BEING SO RESTLESS THAT IT IS HARD TO SIT STILL: MORE THAN HALF THE DAYS
4. TROUBLE RELAXING: SEVERAL DAYS
2. NOT BEING ABLE TO STOP OR CONTROL WORRYING: MORE THAN HALF THE DAYS
GAD7 TOTAL SCORE: 10
3. WORRYING TOO MUCH ABOUT DIFFERENT THINGS: MORE THAN HALF THE DAYS
1. FEELING NERVOUS, ANXIOUS, OR ON EDGE: SEVERAL DAYS
IF YOU CHECKED OFF ANY PROBLEMS ON THIS QUESTIONNAIRE, HOW DIFFICULT HAVE THESE PROBLEMS MADE IT FOR YOU TO DO YOUR WORK, TAKE CARE OF THINGS AT HOME, OR GET ALONG WITH OTHER PEOPLE: SOMEWHAT DIFFICULT
6. BECOMING EASILY ANNOYED OR IRRITABLE: SEVERAL DAYS
7. FEELING AFRAID AS IF SOMETHING AWFUL MIGHT HAPPEN: SEVERAL DAYS

## 2018-12-19 ASSESSMENT — PAIN SCALES - GENERAL: PAINLEVEL: MILD PAIN (3)

## 2018-12-19 ASSESSMENT — PATIENT HEALTH QUESTIONNAIRE - PHQ9: SUM OF ALL RESPONSES TO PHQ QUESTIONS 1-9: 7

## 2018-12-19 ASSESSMENT — MIFFLIN-ST. JEOR: SCORE: 1541.06

## 2018-12-19 NOTE — NURSING NOTE
"Chief Complaint   Patient presents with     Physical       Initial /72   Pulse 64   Temp 96.7  F (35.9  C)   Ht 1.683 m (5' 6.25\")   Wt 92.5 kg (204 lb)   LMP  (LMP Unknown)   SpO2 98%   BMI 32.68 kg/m   Estimated body mass index is 32.68 kg/m  as calculated from the following:    Height as of this encounter: 1.683 m (5' 6.25\").    Weight as of this encounter: 92.5 kg (204 lb).  Medication Reconciliation: complete    Vikas Leggett LPN  "

## 2018-12-20 ASSESSMENT — ANXIETY QUESTIONNAIRES: GAD7 TOTAL SCORE: 10

## 2019-01-02 ENCOUNTER — OFFICE VISIT (OUTPATIENT)
Dept: OBGYN | Facility: OTHER | Age: 54
End: 2019-01-02
Attending: FAMILY MEDICINE
Payer: COMMERCIAL

## 2019-01-02 VITALS
SYSTOLIC BLOOD PRESSURE: 120 MMHG | WEIGHT: 205 LBS | HEART RATE: 68 BPM | HEIGHT: 67 IN | DIASTOLIC BLOOD PRESSURE: 76 MMHG | BODY MASS INDEX: 32.18 KG/M2

## 2019-01-02 DIAGNOSIS — L29.2 VULVAR ITCHING: ICD-10-CM

## 2019-01-02 DIAGNOSIS — N90.4 LEUKOPLAKIA OF VULVA: Primary | ICD-10-CM

## 2019-01-02 PROCEDURE — 99213 OFFICE O/P EST LOW 20 MIN: CPT | Performed by: OBSTETRICS & GYNECOLOGY

## 2019-01-02 RX ORDER — CLOBETASOL PROPIONATE 0.5 MG/G
OINTMENT TOPICAL 2 TIMES DAILY
Qty: 45 G | Refills: 3 | Status: SHIPPED | OUTPATIENT
Start: 2019-01-02 | End: 2019-01-25

## 2019-01-02 RX ORDER — FLUCONAZOLE 100 MG/1
100 TABLET ORAL DAILY
Qty: 14 TABLET | Refills: 0 | Status: SHIPPED | OUTPATIENT
Start: 2019-01-02 | End: 2019-02-13

## 2019-01-02 ASSESSMENT — MIFFLIN-ST. JEOR: SCORE: 1557.5

## 2019-01-02 ASSESSMENT — PAIN SCALES - GENERAL: PAINLEVEL: SEVERE PAIN (7)

## 2019-01-02 NOTE — NURSING NOTE
"Chief Complaint   Patient presents with     Consult     Versich-lichen sclerosis       Initial /76   Pulse 68   Ht 1.702 m (5' 7\")   Wt 93 kg (205 lb)   LMP  (LMP Unknown)   BMI 32.11 kg/m   Estimated body mass index is 32.11 kg/m  as calculated from the following:    Height as of this encounter: 1.702 m (5' 7\").    Weight as of this encounter: 93 kg (205 lb).  Medication Reconciliation: complete    Kimberly Hdez LPN    "

## 2019-01-02 NOTE — PROGRESS NOTES
chief complaint    History of Present Illness: This 53 year old female is seen at the request of Mohsen for evaluation and suggestions of management of vulvar itching and vulvar white areas for quite some time now.  Multiple OTC modalities used.       has a past medical history of Abnormal glandular Papanicolaou smear of cervix (11/21/2003), Cervical high risk HPV (human papillomavirus) test (11/17/2011), Diabetes mellitus (H), Dysplasia of cervix (uteri) (12/29/2003), Esophageal reflux (09/09/2003), Hiatal hernia (11/17/2011), Hyperlipidemia (11/17/2011), JIMÉNEZ (nonalcoholic steatohepatitis) (8/20/2014), Tobacco abuse (11/17/2011), and Tobacco abuse, in remission.   has a past surgical history that includes d & c; exercise stress-negative; examegd; tubal ligation; Colonoscopy (6/19/2014); and leep tx, cervical (2003??).  Melba Hill had no medications administered during this visit.     ROS: 5 point ROS neg other than the symptoms noted above in the HPI.    Exam:  Constitutional: healthy, alert and no distress  GYN:   External vulva shows bilateral white areas               No suspicious lesions,  Consistent with leukoplakia.      A:    Vulvar dystrophy    P:    Diflucan to decrease yeast loads         Clobetasol ointment 46.5 mcg (actual weight)         46.5 mcg (actual weight)

## 2019-01-21 ENCOUNTER — OFFICE VISIT (OUTPATIENT)
Dept: OBGYN | Facility: OTHER | Age: 54
End: 2019-01-21
Attending: OBSTETRICS & GYNECOLOGY
Payer: COMMERCIAL

## 2019-01-21 VITALS
SYSTOLIC BLOOD PRESSURE: 134 MMHG | BODY MASS INDEX: 32.18 KG/M2 | DIASTOLIC BLOOD PRESSURE: 84 MMHG | WEIGHT: 205 LBS | HEART RATE: 78 BPM | HEIGHT: 67 IN

## 2019-01-21 DIAGNOSIS — N90.4 VULVAR DYSTROPHY: Primary | ICD-10-CM

## 2019-01-21 PROCEDURE — 99212 OFFICE O/P EST SF 10 MIN: CPT | Performed by: OBSTETRICS & GYNECOLOGY

## 2019-01-21 PROCEDURE — 88305 TISSUE EXAM BY PATHOLOGIST: CPT | Mod: TC | Performed by: OBSTETRICS & GYNECOLOGY

## 2019-01-21 RX ORDER — CLINDAMYCIN PHOSPHATE 10 MG/G
GEL TOPICAL AT BEDTIME
Qty: 30 G | Refills: 0 | Status: SHIPPED | OUTPATIENT
Start: 2019-01-21 | End: 2019-01-25

## 2019-01-21 ASSESSMENT — MIFFLIN-ST. JEOR: SCORE: 1557.5

## 2019-01-21 ASSESSMENT — PAIN SCALES - GENERAL: PAINLEVEL: EXTREME PAIN (9)

## 2019-01-21 NOTE — PATIENT INSTRUCTIONS
Clobetasol ointment only above the posterior fourchetter----once every night    clindamycin cream to area below fourchette once or twice daily.    NO SOAP

## 2019-01-21 NOTE — NURSING NOTE
"Chief Complaint   Patient presents with     Follow Up       Initial /84   Pulse 78   Ht 1.702 m (5' 7\")   Wt 93 kg (205 lb)   LMP  (LMP Unknown)   BMI 32.11 kg/m   Estimated body mass index is 32.11 kg/m  as calculated from the following:    Height as of this encounter: 1.702 m (5' 7\").    Weight as of this encounter: 93 kg (205 lb).  Medication Reconciliation: complete    Kimberly Hdez LPN    "

## 2019-01-21 NOTE — PROGRESS NOTES
S:   Irritation from clobetasol around rectum.  Ointment is working well above the posterior fourchette.    O:   Area above posterior fourchette is improved.    Perianal area shows irritation and is tender    Biopsies done RIGHT Labia, Vulva                          LEFT Vulva    A:   Vulvar dystrophy        Reaction perianally    P:  OK to put Clobetasol ointment above posterior fourchette.  Clindamycin topical cream perianally    Follow-up 3 weeks

## 2019-01-22 ENCOUNTER — TELEPHONE (OUTPATIENT)
Dept: OBGYN | Facility: OTHER | Age: 54
End: 2019-01-22

## 2019-01-22 NOTE — TELEPHONE ENCOUNTER
1/22/2019 - received PA request from Coretta Loomis for clindamycin.  Submitted thru [facility].  Waiting for response.  Jacklyn Johnson, HIS Specialist.

## 2019-01-23 LAB — COPATH REPORT: NORMAL

## 2019-01-25 DIAGNOSIS — N90.4 VULVAR DYSTROPHY: Primary | ICD-10-CM

## 2019-01-25 RX ORDER — DESOXIMETASONE 0.5 MG/G
CREAM TOPICAL
Qty: 60 G | Refills: 3 | Status: SHIPPED | OUTPATIENT
Start: 2019-01-25 | End: 2020-04-03

## 2019-01-31 NOTE — TELEPHONE ENCOUNTER
DENIAL - 01/31/2019 - I called [facility] to inquire about the status of PA for clindamycin.  I spoke with Lilian at [phone].  She stated that a denial was issued on 1/22/2019.  Denial reason:  Diagnosis is not FDA approved; only approved for acne and for patients under age of 40.  I requested that they fax the letter to our office.  Pharmacy advised.  Documentation scanned to Epic.  Jacklyn Johnson, HIS Specialist.

## 2019-02-02 DIAGNOSIS — E11.9 TYPE 2 DIABETES MELLITUS WITHOUT COMPLICATION, WITHOUT LONG-TERM CURRENT USE OF INSULIN (H): ICD-10-CM

## 2019-02-04 RX ORDER — METFORMIN HCL 500 MG
TABLET, EXTENDED RELEASE 24 HR ORAL
Qty: 270 TABLET | Refills: 1 | Status: SHIPPED | OUTPATIENT
Start: 2019-02-04 | End: 2019-06-11

## 2019-02-13 ENCOUNTER — OFFICE VISIT (OUTPATIENT)
Dept: OBGYN | Facility: OTHER | Age: 54
End: 2019-02-13
Attending: OBSTETRICS & GYNECOLOGY
Payer: COMMERCIAL

## 2019-02-13 VITALS
BODY MASS INDEX: 32.11 KG/M2 | DIASTOLIC BLOOD PRESSURE: 74 MMHG | HEART RATE: 62 BPM | HEIGHT: 67 IN | SYSTOLIC BLOOD PRESSURE: 128 MMHG

## 2019-02-13 DIAGNOSIS — N90.4 VULVAR DYSTROPHY: Primary | ICD-10-CM

## 2019-02-13 PROCEDURE — 99212 OFFICE O/P EST SF 10 MIN: CPT | Performed by: OBSTETRICS & GYNECOLOGY

## 2019-02-13 ASSESSMENT — PAIN SCALES - GENERAL: PAINLEVEL: NO PAIN (0)

## 2019-02-13 NOTE — NURSING NOTE
"Chief Complaint   Patient presents with     Follow Up       Initial /74   Pulse 62   Ht 1.702 m (5' 7\")   LMP  (LMP Unknown)   BMI 32.11 kg/m   Estimated body mass index is 32.11 kg/m  as calculated from the following:    Height as of this encounter: 1.702 m (5' 7\").    Weight as of 1/21/19: 93 kg (205 lb).  Medication Reconciliation: complete    Kimberly Hdez LPN    "

## 2019-02-13 NOTE — PROGRESS NOTES
S:   Vulva is doing much better with triple antibiotic cream.    O:   Vulvar has healed from reaction to clobetasol ointment.  Labia still has lichen sclerosis areas.    A:   Lichen sclerosis    P:   Trial of Topicort cream  Recheck 4 weeks.

## 2019-02-15 ENCOUNTER — HEALTH MAINTENANCE LETTER (OUTPATIENT)
Age: 54
End: 2019-02-15

## 2019-03-13 ENCOUNTER — OFFICE VISIT (OUTPATIENT)
Dept: OBGYN | Facility: OTHER | Age: 54
End: 2019-03-13
Attending: OBSTETRICS & GYNECOLOGY
Payer: COMMERCIAL

## 2019-03-13 VITALS
HEART RATE: 60 BPM | SYSTOLIC BLOOD PRESSURE: 122 MMHG | DIASTOLIC BLOOD PRESSURE: 70 MMHG | BODY MASS INDEX: 32.11 KG/M2 | HEIGHT: 67 IN

## 2019-03-13 DIAGNOSIS — N90.4 VULVAR DYSTROPHY: Primary | ICD-10-CM

## 2019-03-13 PROCEDURE — 99212 OFFICE O/P EST SF 10 MIN: CPT | Performed by: OBSTETRICS & GYNECOLOGY

## 2019-03-13 ASSESSMENT — PAIN SCALES - GENERAL: PAINLEVEL: NO PAIN (0)

## 2019-03-13 NOTE — NURSING NOTE
"Chief Complaint   Patient presents with     RECHECK       Initial /70   Pulse 60   Ht 1.702 m (5' 7\")   LMP  (LMP Unknown)   BMI 32.11 kg/m   Estimated body mass index is 32.11 kg/m  as calculated from the following:    Height as of this encounter: 1.702 m (5' 7\").    Weight as of 1/21/19: 93 kg (205 lb).  Medication Reconciliation: complete    Kimberly Hdez LPN    "

## 2019-03-13 NOTE — PROGRESS NOTES
S:   Follow-up check for vulvar dystrophy. Patient is much better and very pleased.    O:   Vulva is normalizing    A:   Biopsy proven lichen sclerosis    P:   Continue nightly Topicort.        Follow-up 2 months.

## 2019-04-16 NOTE — PROGRESS NOTES
SUBJECTIVE:   Melba Hill is a 54 year old female who presents to clinic today for the following   health issues:      Diabetes Follow-up    Patient is checking blood sugars: once daily.  Results are as follows:         am - 120-130    Diabetic concerns: None     Symptoms of hypoglycemia (low blood sugar): shaky, dizzy, weak     Paresthesias (numbness or burning in feet) or sores: No     Date of last diabetic eye exam: due  1. foot deformity   No  2. Current or previous foot ulceration     No  3. Current or previous pre-ulcerative calluses     No  4. previous partial amputation of one or both feet or complete amputation of one foot     No  5. peripheral neuropathy with evidence of callus formation     No  6. poor circulation     No      BP Readings from Last 2 Encounters:   03/13/19 122/70   02/13/19 128/74     Hemoglobin A1C (%)   Date Value   12/19/2018 6.7 (H)   08/22/2018 7.0 (H)     LDL Cholesterol Calculated (mg/dL)   Date Value   08/22/2018 66   05/17/2017 52       Diabetes Management Resources    Amount of exercise or physical activity: 4-5 days/week for an average of 30-45 minutes    Problems taking medications regularly: No    Medication side effects: none    Diet: regular (no restrictions)            Additional history: as documented    Reviewed  and updated as needed this visit by clinical staff         Reviewed and updated as needed this visit by Provider         Labs reviewed in EPIC    ROS:  CONSTITUTIONAL: NEGATIVE for fever, chills, change in weight  ENT/MOUTH: NEGATIVE for ear, mouth and throat problems  RESP: NEGATIVE for significant cough or SOB  CV: NEGATIVE for chest pain, palpitations or peripheral edema  ROS otherwise negative    OBJECTIVE:                                                    /70   Pulse 69   Temp 97.3  F (36.3  C)   Wt 95.7 kg (211 lb)   LMP  (LMP Unknown)   SpO2 95%   BMI 33.05 kg/m    Body mass index is 33.05 kg/m .   GENERAL: healthy, alert, well nourished,  well hydrated, no distress  NECK: no tenderness, no adenopathy, no asymmetry, no masses, no stiffness; thyroid- normal to palpation  RESP: lungs clear to auscultation - no rales, no rhonchi, no wheezes  CV: regular rates and rhythm, normal S1 S2, no S3 or S4 and no murmur, no click or rub -  Diabetic foot exam: normal DP and PT pulses, no trophic changes or ulcerative lesions, normal sensory exam and normal monofilament exam    Results for orders placed or performed in visit on 04/24/19   Hemoglobin A1c   Result Value Ref Range    Hemoglobin A1C 7.5 (H) 0 - 5.6 %   Estimated Average Glucose   Result Value Ref Range    Estimated Average Glucose 169 mg/dL          ASSESSMENT/PLAN:                                                    1. Type 2 diabetes mellitus without complication, without long-term current use of insulin (H)  A1C up with winter. Discussed.  Continue current medications and behavioral changes.   Work on better eating habits. F/u in 4 months.  Needs eye check -  Pt is to get own appt.   - Hemoglobin A1c; Future          Alvin Connolly MD  [facility]

## 2019-04-24 ENCOUNTER — OFFICE VISIT (OUTPATIENT)
Dept: FAMILY MEDICINE | Facility: OTHER | Age: 54
End: 2019-04-24
Attending: FAMILY MEDICINE
Payer: COMMERCIAL

## 2019-04-24 VITALS
HEART RATE: 69 BPM | WEIGHT: 211 LBS | OXYGEN SATURATION: 95 % | TEMPERATURE: 97.3 F | DIASTOLIC BLOOD PRESSURE: 70 MMHG | BODY MASS INDEX: 33.05 KG/M2 | SYSTOLIC BLOOD PRESSURE: 134 MMHG

## 2019-04-24 DIAGNOSIS — E11.9 TYPE 2 DIABETES MELLITUS WITHOUT COMPLICATION, WITHOUT LONG-TERM CURRENT USE OF INSULIN (H): Primary | ICD-10-CM

## 2019-04-24 DIAGNOSIS — E11.9 TYPE 2 DIABETES MELLITUS WITHOUT COMPLICATION, WITHOUT LONG-TERM CURRENT USE OF INSULIN (H): ICD-10-CM

## 2019-04-24 LAB
EST. AVERAGE GLUCOSE BLD GHB EST-MCNC: 169 MG/DL
HBA1C MFR BLD: 7.5 % (ref 0–5.6)

## 2019-04-24 PROCEDURE — 83036 HEMOGLOBIN GLYCOSYLATED A1C: CPT | Performed by: FAMILY MEDICINE

## 2019-04-24 PROCEDURE — 99213 OFFICE O/P EST LOW 20 MIN: CPT | Performed by: FAMILY MEDICINE

## 2019-04-24 PROCEDURE — 36415 COLL VENOUS BLD VENIPUNCTURE: CPT | Performed by: FAMILY MEDICINE

## 2019-04-24 ASSESSMENT — PAIN SCALES - GENERAL: PAINLEVEL: MILD PAIN (3)

## 2019-04-24 NOTE — NURSING NOTE
"Chief Complaint   Patient presents with     Diabetes       Initial /70   Pulse 69   Temp 97.3  F (36.3  C)   Wt 95.7 kg (211 lb)   LMP  (LMP Unknown)   SpO2 95%   BMI 33.05 kg/m   Estimated body mass index is 33.05 kg/m  as calculated from the following:    Height as of 3/13/19: 1.702 m (5' 7\").    Weight as of this encounter: 95.7 kg (211 lb).  Medication Reconciliation: complete    Vikas Leggett LPN  "

## 2019-05-15 ENCOUNTER — OFFICE VISIT (OUTPATIENT)
Dept: OBGYN | Facility: OTHER | Age: 54
End: 2019-05-15
Attending: OBSTETRICS & GYNECOLOGY
Payer: COMMERCIAL

## 2019-05-15 VITALS
HEART RATE: 72 BPM | OXYGEN SATURATION: 98 % | BODY MASS INDEX: 33.05 KG/M2 | SYSTOLIC BLOOD PRESSURE: 128 MMHG | DIASTOLIC BLOOD PRESSURE: 78 MMHG | HEIGHT: 67 IN

## 2019-05-15 DIAGNOSIS — N90.4 VULVAR DYSTROPHY: Primary | ICD-10-CM

## 2019-05-15 PROCEDURE — 99212 OFFICE O/P EST SF 10 MIN: CPT | Performed by: OBSTETRICS & GYNECOLOGY

## 2019-05-15 ASSESSMENT — PAIN SCALES - GENERAL: PAINLEVEL: NO PAIN (0)

## 2019-05-15 NOTE — NURSING NOTE
"Chief Complaint   Patient presents with     RECHECK       Initial /78   Pulse 72   Ht 1.702 m (5' 7\")   LMP  (LMP Unknown)   SpO2 98%   BMI 33.05 kg/m   Estimated body mass index is 33.05 kg/m  as calculated from the following:    Height as of this encounter: 1.702 m (5' 7\").    Weight as of 4/24/19: 95.7 kg (211 lb).  Medication Reconciliation: complete    Kimberly Hdez LPN    "

## 2019-05-15 NOTE — PROGRESS NOTES
S:   Follow-up for Lichen Sclerosis.  Doing much better and  thinks it looks much better.    O:   Vulva is healing well    A:   Lichen sclerosis-vulvar dystrophy resolving    P:   Continue Topicort nightly         Recheck 3 months.

## 2019-06-11 DIAGNOSIS — E78.2 MIXED HYPERLIPIDEMIA: ICD-10-CM

## 2019-06-11 DIAGNOSIS — E11.9 TYPE 2 DIABETES MELLITUS WITHOUT COMPLICATION, WITHOUT LONG-TERM CURRENT USE OF INSULIN (H): ICD-10-CM

## 2019-06-12 RX ORDER — METFORMIN HCL 500 MG
TABLET, EXTENDED RELEASE 24 HR ORAL
Qty: 270 TABLET | Refills: 0 | Status: SHIPPED | OUTPATIENT
Start: 2019-06-12 | End: 2019-09-03

## 2019-06-12 RX ORDER — SIMVASTATIN 20 MG
TABLET ORAL
Qty: 90 TABLET | Refills: 0 | Status: SHIPPED | OUTPATIENT
Start: 2019-06-12 | End: 2019-08-20

## 2019-08-05 NOTE — PROGRESS NOTES
Subjective     Melba Hill is a 54 year old female who presents to clinic today for the following health issues:    HPI   Rash      Duration: 5 months    Description  Location: both shoulders, both wrists, both thighs, both armpits, and right side of neck-- lots of areas that are similar and symmetrical adelina shoulders and wrist and also both axilla .   Itching: moderate, sometimes    Intensity:  moderate    Accompanying signs and symptoms: None    History (similar episodes/previous evaluation): None    Precipitating or alleviating factors:  New exposures:  medication januvia started about a year ago  Recent travel: no      Therapies tried and outcome: hydrocortisone cream -  not effective    They start out like a blister but not fully blister or fluid filled.     Rash or lesion correlates to when Januvia started.     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  Lots of issues there      Increase depressive sx and anxiety     Hard time dealing with all going on    Pt has more fatigue and tiredness    Not sleeping well and being over worked with forced overtime at work    Needs to work for $$ but can not do it all.     PHQ-9 SCORE 8/22/2018 12/19/2018 8/13/2019   PHQ-9 Total Score - - -   PHQ-9 Total Score 5 7 13     AN-7 SCORE 3/20/2018 8/22/2018 12/19/2018   Total Score 7 12 10             Current Outpatient Medications   Medication Sig Dispense Refill      ACCU-CHEK FASTCLIX LANCETS MISC Please use fresh needle to check blood glucose three times a day 100 each 12     ACCU-CHEK SMARTVIEW test strip USE TO TEST BLOOD SUGARS THREE TIMES DAILY 100 each 11     desoximetasone (TOPICORT LP) 0.05 % external cream Apply to outside of the affected Vulvar areas every night before sleep 60 g 3     loratadine (CLARITIN) 10 MG tablet Take 10 mg by mouth daily as needed        Lutein 20 MG CAPS Take by mouth daily       MAGNESIUM OXIDE PO Take by mouth daily       metFORMIN (GLUCOPHAGE-XR) 500 MG 24 hr tablet TAKE 3 TABLETS BY MOUTH DAILY WITH DINNER 270 tablet 0     NONFORMULARY Tumeric       NONFORMULARY Potassium 99 mg once daily       omega-3 acid ethyl esters (LOVAZA) 1 G capsule Take 2 g by mouth 2 times daily       omeprazole (PRILOSEC) 20 MG capsule Take by mouth daily       sertraline (ZOLOFT) 50 MG tablet Take 1 tablet (50 mg) by mouth daily 30 tablet 1     simvastatin (ZOCOR) 20 MG tablet TAKE 1 TABLET BY MOUTH AT BEDTIME - GENERIC FOR ZOCOR 90 tablet 0     sitagliptin (JANUVIA) 100 MG tablet Take 1 tablet (100 mg) by mouth daily 90 tablet 1     tiZANidine (ZANAFLEX) 4 MG tablet Take 1 tablet (4 mg) by mouth 3 times daily as needed for muscle spasms 30 tablet 2     vitamin  B complex with vitamin C (VITAMIN  B COMPLEX) TABS Take 1 tablet by mouth daily       VITAMIN D, CHOLECALCIFEROL, PO Take 1,000 Units by mouth daily           Reviewed and updated as needed this visit by Provider         Review of Systems   ROS COMP: CONSTITUTIONAL: NEGATIVE for fever, chills, change in weight  RESP: NEGATIVE for significant cough or SOB  CV: NEGATIVE for chest pain, palpitations or peripheral edema      Objective    /62 (BP Location: Right arm, Patient Position: Sitting, Cuff Size: Adult Regular)   Pulse 81   Temp 97  F (36.1  C) (Tympanic)   Wt 94.3 kg (208 lb)   LMP  (LMP Unknown)   SpO2 98%   BMI 32.58 kg/m    Body mass index is 32.58 kg/m .  Physical Exam   GENERAL:  healthy, alert and no distress  SKIN: right axilla - new lesions of circular erythema with some skin breakdown.  No true blisters.  Shoulder /wrists/thighs - some symmetric patches that are hypopigmented and dry - looking like resolving deep blister.   PSYCH: mentation appears normal, affect anxious /sad/overwhelmed.             Assessment & Plan     1. Type 2 diabetes mellitus without complication, without long-term current use of insulin (H)  Has f/u appt in early September.  ??? Januvia causing lesions. Pharm D looking into it.    2. Adjustment disorder with mixed anxiety and depressed mood  Discussed.  Will add Zoloft. R/B discussed.  Consider counseling . Will allow for no OT and max 8 hrs / day and 40 hrs/ week for one month to start Zoloft and re-energize herself.  Some self - reflection with her work and on going care issues with mother. Pt agrees with plan  - sertraline (ZOLOFT) 50 MG tablet; Take 1 tablet (50 mg) by mouth daily  Dispense: 30 tablet; Refill: 1    3. Skin lesion  Discussed. Will do a punch bx - refer to derm.  ?? Januvia. - can produce sten edward/bullous Phemigoid./hives/generalized exfoliating dermatitis.   Prilosec and zocor can cause skin issues. Timing goes with januvia. She will stop januvia.  F/u 9/9- no addition to DM meds will be done today.  Appt next week with DR Michele nieves   - DERMATOLOGY REFERRAL  - SURGICAL TRAY - MINOR  - Surgical pathology exam           No follow-ups on file.    Alvin Connolly MD  [facility] - [place]    Subjective: Melba Hill a 54 year old female who presents today for lesion removal. The lesion(s) is/are located on the axilla, number 1 and measures 0.3cm She The patient reports the lesion is itching and irritated and denies other significant symptoms on ROS. Medications reviewed.    Pause for the cause has been completed prior to the prceedure.   1. Melba was identified by both name and date of birth   2. The correct site was  identified   3. Site was marked by provider    4. Written informed consent correct and signed or verbal authorization  to proceed was obtained   5. Verifed necessary supplies, equipment, and diagnostics are available    6. Time out was performed immediately prior to procedure    Objective: The lesion(s) is/are of the above mentioned size and location and is/are erythematous. The area was prepped and appropriately anesthetized. Using the usual technique, punch biopsy size 3 mm was performed. An appropriate dressing was applied. The procedure was well tolerated and without complications.     Assessment: Drug reaction ??    Plan: Follow up: The specimen is labelled and sent to pathology for evaluation., f/u with Dr Bautista next week . Wound care instructions provided.  Patient was instructed to be alert for any signs of cutaneous infection.

## 2019-08-13 ENCOUNTER — OFFICE VISIT (OUTPATIENT)
Dept: FAMILY MEDICINE | Facility: OTHER | Age: 54
End: 2019-08-13
Attending: FAMILY MEDICINE
Payer: COMMERCIAL

## 2019-08-13 VITALS
TEMPERATURE: 97 F | SYSTOLIC BLOOD PRESSURE: 120 MMHG | BODY MASS INDEX: 32.58 KG/M2 | HEART RATE: 81 BPM | DIASTOLIC BLOOD PRESSURE: 62 MMHG | WEIGHT: 208 LBS | OXYGEN SATURATION: 98 %

## 2019-08-13 DIAGNOSIS — E11.9 TYPE 2 DIABETES MELLITUS WITHOUT COMPLICATION, WITHOUT LONG-TERM CURRENT USE OF INSULIN (H): Primary | ICD-10-CM

## 2019-08-13 DIAGNOSIS — L98.9 SKIN LESION: ICD-10-CM

## 2019-08-13 DIAGNOSIS — F43.23 ADJUSTMENT DISORDER WITH MIXED ANXIETY AND DEPRESSED MOOD: ICD-10-CM

## 2019-08-13 PROCEDURE — 99213 OFFICE O/P EST LOW 20 MIN: CPT | Mod: 25 | Performed by: FAMILY MEDICINE

## 2019-08-13 PROCEDURE — 11104 PUNCH BX SKIN SINGLE LESION: CPT | Performed by: FAMILY MEDICINE

## 2019-08-13 PROCEDURE — 88305 TISSUE EXAM BY PATHOLOGIST: CPT | Mod: TC | Performed by: FAMILY MEDICINE

## 2019-08-13 PROCEDURE — 88313 SPECIAL STAINS GROUP 2: CPT | Mod: TC | Performed by: FAMILY MEDICINE

## 2019-08-13 ASSESSMENT — PAIN SCALES - GENERAL: PAINLEVEL: NO PAIN (0)

## 2019-08-13 ASSESSMENT — PATIENT HEALTH QUESTIONNAIRE - PHQ9: SUM OF ALL RESPONSES TO PHQ QUESTIONS 1-9: 13

## 2019-08-13 NOTE — NURSING NOTE
"Chief Complaint   Patient presents with     Derm Problem       Initial /62 (BP Location: Right arm, Patient Position: Sitting, Cuff Size: Adult Regular)   Pulse 81   Temp 97  F (36.1  C) (Tympanic)   Wt 94.3 kg (208 lb)   LMP  (LMP Unknown)   SpO2 98%   BMI 32.58 kg/m   Estimated body mass index is 32.58 kg/m  as calculated from the following:    Height as of 5/15/19: 1.702 m (5' 7\").    Weight as of this encounter: 94.3 kg (208 lb).  Medication Reconciliation: complete   Yani Victoria LPN    "

## 2019-08-14 ENCOUNTER — OFFICE VISIT (OUTPATIENT)
Dept: OBGYN | Facility: OTHER | Age: 54
End: 2019-08-14
Attending: OBSTETRICS & GYNECOLOGY
Payer: COMMERCIAL

## 2019-08-14 VITALS
HEART RATE: 64 BPM | WEIGHT: 208 LBS | BODY MASS INDEX: 32.65 KG/M2 | SYSTOLIC BLOOD PRESSURE: 122 MMHG | DIASTOLIC BLOOD PRESSURE: 72 MMHG | HEIGHT: 67 IN

## 2019-08-14 DIAGNOSIS — N90.4 VULVAR DYSTROPHY: Primary | ICD-10-CM

## 2019-08-14 PROCEDURE — 99212 OFFICE O/P EST SF 10 MIN: CPT | Performed by: OBSTETRICS & GYNECOLOGY

## 2019-08-14 ASSESSMENT — MIFFLIN-ST. JEOR: SCORE: 1566.11

## 2019-08-14 ASSESSMENT — PAIN SCALES - GENERAL: PAINLEVEL: NO PAIN (0)

## 2019-08-14 NOTE — PROGRESS NOTES
S:   Recheck lichen sclerosis.  Has no complaints.  Using topicort at bedtime.  Vulva is doing much better.  Rarely some irritation, but symptoms are 90% improved.    O:   Vulva is normalizing its architecture.    A:   Lichen sclerosis vulva normalizing with topicort at bedtime.    P:   Maintain at bedtime regimen for now and recheck in December.  Patient is pleased.

## 2019-08-14 NOTE — NURSING NOTE
"Chief Complaint   Patient presents with     RECHECK       Initial /72   Pulse 64   Ht 1.702 m (5' 7\")   Wt 94.3 kg (208 lb)   LMP  (LMP Unknown)   BMI 32.58 kg/m   Estimated body mass index is 32.58 kg/m  as calculated from the following:    Height as of this encounter: 1.702 m (5' 7\").    Weight as of this encounter: 94.3 kg (208 lb).  Medication Reconciliation: ericka Hdez      "

## 2019-08-15 NOTE — PROGRESS NOTES
"Subjective     Melba Hill is a 54 year old female who presents to clinic today for the following health issues:    HPI   Diabetes Follow-up      How often are you checking your blood sugar? A few times a month    What time of day are you checking your blood sugars (select all that apply)?  Before meals    Have you had any blood sugars above 200?  Yes 234    Have you had any blood sugars below 70?  No    What symptoms do you notice when your blood sugar is low?  Shaky and Dizzy    What concerns do you have today about your diabetes? None     Do you have any of these symptoms? (Select all that apply)  No numbness or tingling in feet.  No redness, sores or blisters on feet.  No complaints of excessive thirst.  No reports of blurry vision.  No significant changes to weight.     Have you had a diabetic eye exam in the last 12 months? No     BS elevated off januvia BUT rash some better with off Januvia     BP Readings from Last 2 Encounters:   08/14/19 122/72   08/13/19 120/62     Hemoglobin A1C (%)   Date Value   04/24/2019 7.5 (H)   12/19/2018 6.7 (H)     LDL Cholesterol Calculated (mg/dL)   Date Value   08/22/2018 66   05/17/2017 52       Diabetes Management Resources      How many servings of fruits and vegetables do you eat daily?  2-3    On average, how many sweetened beverages do you drink each day (soda, juice, sweet tea, etc)?   0    How many days per week do you miss taking your medication? 0    RASH- BX noted to be lichen sclerosis which she also had on her vulva.                Reviewed and updated as needed this visit by Provider         Review of Systems   ROS COMP: Constitutional, HEENT, cardiovascular, pulmonary, gi and gu systems are negative, except as otherwise noted.      Objective    /72   Pulse 60   Ht 1.702 m (5' 7\")   Wt 94.3 kg (208 lb)   LMP  (LMP Unknown)   SpO2 97%   BMI 32.58 kg/m    Body mass index is 32.58 kg/m .  Physical Exam   GENERAL: healthy, alert and no distress  NECK: " "no adenopathy, no asymmetry, masses, or scars and thyroid normal to palpation  RESP: lungs clear to auscultation - no rales, rhonchi or wheezes  CV: regular rate and rhythm, normal S1 S2, no S3 or S4, no murmur, click or rub, no peripheral edema and peripheral pulses strong  SKIN: some resolution of her rashes noted.             Assessment & Plan     1. Type 2 diabetes mellitus without complication, without long-term current use of insulin (H)  Little worse since off Januvia which ?? Could possibly causative agent for rash.  She feels it correlated with start of rash and improvement of getting off med.  Will go up on Glucophage to 2000mg and see if helps sugars. F/u in 3 months   - Hemoglobin A1c; Future  - metFORMIN (GLUCOPHAGE-XR) 500 MG 24 hr tablet; Take 4 tablets (2,000 mg) by mouth daily (with dinner)  Dispense: 360 tablet; Refill: 1    2. Rash and nonspecific skin eruption  Will send back to Dr Jain in 2 weeks to see what his thoughts. His suspicion different from my Bx.  ?? If feels related to januvia- can produce skin eruptions but not what bx showed or Dr mena diff. Dx       BMI:   Estimated body mass index is 32.58 kg/m  as calculated from the following:    Height as of this encounter: 1.702 m (5' 7\").    Weight as of this encounter: 94.3 kg (208 lb).               No follow-ups on file.    Alvin Connolly MD  [facility] - [place]      "

## 2019-08-16 ENCOUNTER — HOSPITAL PATHOLOGY (OUTPATIENT)
Dept: OTHER | Facility: CLINIC | Age: 54
End: 2019-08-16

## 2019-08-20 ENCOUNTER — OFFICE VISIT (OUTPATIENT)
Dept: DERMATOLOGY | Facility: OTHER | Age: 54
End: 2019-08-20
Attending: DERMATOLOGY
Payer: COMMERCIAL

## 2019-08-20 VITALS
HEIGHT: 67 IN | RESPIRATION RATE: 16 BRPM | DIASTOLIC BLOOD PRESSURE: 74 MMHG | SYSTOLIC BLOOD PRESSURE: 122 MMHG | BODY MASS INDEX: 32.33 KG/M2 | OXYGEN SATURATION: 99 % | HEART RATE: 61 BPM | WEIGHT: 206 LBS

## 2019-08-20 DIAGNOSIS — L98.9 SKIN LESION: ICD-10-CM

## 2019-08-20 DIAGNOSIS — L94.0 MORPHEA: Primary | ICD-10-CM

## 2019-08-20 PROCEDURE — 99202 OFFICE O/P NEW SF 15 MIN: CPT | Performed by: DERMATOLOGY

## 2019-08-20 RX ORDER — TRIAMCINOLONE ACETONIDE 0.25 MG/G
CREAM TOPICAL 2 TIMES DAILY
Qty: 80 G | Refills: 1 | Status: SHIPPED | OUTPATIENT
Start: 2019-08-20

## 2019-08-20 ASSESSMENT — PAIN SCALES - GENERAL: PAINLEVEL: NO PAIN (0)

## 2019-08-20 ASSESSMENT — MIFFLIN-ST. JEOR: SCORE: 1557.04

## 2019-08-20 NOTE — PATIENT INSTRUCTIONS
The lesions on your shoulder and other sites appear to be those of a condition called localized scleroderma or morphea.  Unknown cause. Occasionally they are preceeded by redness or inflammation.     Unless there is the associated redness or discomfort we usually don't treat this.  If more lesions start showing up or there is special concern, please contact us and we can look at some safe therapies who have been used in other patients.

## 2019-08-20 NOTE — LETTER
8/20/2019       RE: Melba Hill  [address] [facility] [id number]     Dear Colleague,    Thank you for referring your patient, Melba Hill, to the [facility]. Please see a copy of my visit note below.    dictated    Again, thank you for allowing me to participate in the care of your patient.      Sincerely,    TOMMIE Bautista MD

## 2019-08-20 NOTE — CONSULTS
Consult Date:  2019      DERMATOLOGY CONSULTATION      SUBJECTIVE:  Kenn has in the last several months, perhaps years, been noting some whitish lesions on her shoulders, wrists and under her arms and legs.  A biopsy was recently done by Dr. Connolly but the result is not yet available. .      OBJECTIVE:  She is a healthy lady.  On her shoulder is a 3 cm. white porcelain white atrophic patch of skin.  On other areas, there  are round whitish atrophic lesions.  They are asymptomatic.  No history of trauma to the sites, simply spontaneous presence of these lesions.      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     PLAN:  I advised that this is likely the diagnosis and that usually little can be done if they are in this particular stage of the final scarring stage.  The biopsy may be supportive and so we await that result.      I advised Kenn that with the biopsy and clinical diagnosis pending, we would probably not recommend anything for these atrophic lesions.  Should she, however, develop some new erythematous early lesions, then we could certainly try some anti-inflammatory topicals as a first step.  Unfortunately, that is rarely helpful for this process of unclear etiology.      MEDICATIONS AND ALLERGIES:  Reviewed.      Return pending what we find with the biopsy.         TOMMIE LEE MD             D: 2019   T: 2019   MT: TYLER      Name:     KENN LAURENT   MRN:      [id number]        Account:       [id number]   :      1965           Consult Date:  2019      Document: [id number]       cc: Alvin Connolly MD

## 2019-08-23 LAB — COPATH REPORT: NORMAL

## 2019-09-03 DIAGNOSIS — E11.9 TYPE 2 DIABETES MELLITUS WITHOUT COMPLICATION, WITHOUT LONG-TERM CURRENT USE OF INSULIN (H): ICD-10-CM

## 2019-09-04 ENCOUNTER — OFFICE VISIT (OUTPATIENT)
Dept: FAMILY MEDICINE | Facility: OTHER | Age: 54
End: 2019-09-04
Attending: FAMILY MEDICINE
Payer: COMMERCIAL

## 2019-09-04 VITALS
DIASTOLIC BLOOD PRESSURE: 72 MMHG | BODY MASS INDEX: 32.65 KG/M2 | OXYGEN SATURATION: 97 % | WEIGHT: 208 LBS | SYSTOLIC BLOOD PRESSURE: 120 MMHG | HEIGHT: 67 IN | HEART RATE: 60 BPM

## 2019-09-04 DIAGNOSIS — E11.9 TYPE 2 DIABETES MELLITUS WITHOUT COMPLICATION, WITHOUT LONG-TERM CURRENT USE OF INSULIN (H): ICD-10-CM

## 2019-09-04 DIAGNOSIS — R21 RASH AND NONSPECIFIC SKIN ERUPTION: ICD-10-CM

## 2019-09-04 DIAGNOSIS — E11.9 TYPE 2 DIABETES MELLITUS WITHOUT COMPLICATION, WITHOUT LONG-TERM CURRENT USE OF INSULIN (H): Primary | ICD-10-CM

## 2019-09-04 LAB
EST. AVERAGE GLUCOSE BLD GHB EST-MCNC: 186 MG/DL
HBA1C MFR BLD: 8.1 % (ref 0–5.6)

## 2019-09-04 PROCEDURE — 83036 HEMOGLOBIN GLYCOSYLATED A1C: CPT | Performed by: FAMILY MEDICINE

## 2019-09-04 PROCEDURE — 36415 COLL VENOUS BLD VENIPUNCTURE: CPT | Performed by: FAMILY MEDICINE

## 2019-09-04 PROCEDURE — 99213 OFFICE O/P EST LOW 20 MIN: CPT | Performed by: FAMILY MEDICINE

## 2019-09-04 RX ORDER — METFORMIN HCL 500 MG
TABLET, EXTENDED RELEASE 24 HR ORAL
Qty: 270 TABLET | Refills: 0 | Status: SHIPPED | OUTPATIENT
Start: 2019-09-04 | End: 2019-09-04

## 2019-09-04 RX ORDER — METFORMIN HCL 500 MG
2000 TABLET, EXTENDED RELEASE 24 HR ORAL
Qty: 360 TABLET | Refills: 1 | Status: SHIPPED | OUTPATIENT
Start: 2019-09-04 | End: 2019-12-08

## 2019-09-04 ASSESSMENT — MIFFLIN-ST. JEOR: SCORE: 1566.11

## 2019-09-04 ASSESSMENT — PAIN SCALES - GENERAL: PAINLEVEL: NO PAIN (0)

## 2019-09-16 LAB — COPATH REPORT: NORMAL

## 2019-09-17 ENCOUNTER — OFFICE VISIT (OUTPATIENT)
Dept: DERMATOLOGY | Facility: OTHER | Age: 54
End: 2019-09-17
Attending: DERMATOLOGY
Payer: COMMERCIAL

## 2019-09-17 VITALS
TEMPERATURE: 96.9 F | BODY MASS INDEX: 31.86 KG/M2 | SYSTOLIC BLOOD PRESSURE: 130 MMHG | DIASTOLIC BLOOD PRESSURE: 60 MMHG | OXYGEN SATURATION: 98 % | HEIGHT: 67 IN | HEART RATE: 75 BPM | WEIGHT: 203 LBS

## 2019-09-17 DIAGNOSIS — L94.0 MORPHEA: ICD-10-CM

## 2019-09-17 DIAGNOSIS — L90.0 LICHEN SCLEROSUS: Primary | ICD-10-CM

## 2019-09-17 PROCEDURE — 99213 OFFICE O/P EST LOW 20 MIN: CPT | Performed by: DERMATOLOGY

## 2019-09-17 ASSESSMENT — MIFFLIN-ST. JEOR: SCORE: 1543.43

## 2019-09-17 ASSESSMENT — PAIN SCALES - GENERAL: PAINLEVEL: NO PAIN (0)

## 2019-09-17 NOTE — LETTER
2019       RE: Kenn Hill  [address] [facility] [address]     Dear Colleague,    Thank you for referring your patient, Kenn Hill, to the [facility] at [facility]. Please see a copy of my visit note below.    Visit Date:   2019      SUBJECTIVE:  Kenn returns.  The biopsy showed evidence of lichen sclerosus.  She returns today to discuss this issue.  I believe that she does have morphea or  lichen sclerosis.  Both of these are inflammatory disorders followed by sclerosing or scarring down of the skin.  Fortunately, the lesions are not in any critical areas.  She does some genital activity, which indicates  lichen sclerosis there.      PLAN:  I advised Kenn that these two entities are very similar in histology and in the mystery of the causation.      Recommend that she continue the triamcinolone ointment to some of the newer lesions and/or could use more potent topical, which she was given for her genital lesions.  I have recommended that she see what happens and get back to me in perhaps 6 months or sooner.  Should she develop many new lesions, we might be able to do some intralesional work or I would review the literature to see if any new experimental therapies are present for this bothersome problem.      She seems quite accepting of the diagnosis and had no particular questions, but I will be happy to answer them should they arise.  Meds and allergies reviewed.         TOMMIE LEE MD             D: 2019   T: 2019   MT: MARGOT      Name:     KENN HILL   MRN:      [id number]        Account:      [id number]   :      1965           Visit Date:   2019      Document: [id number]        Again, thank you for allowing me to participate in the care of your patient.      Sincerely,    TOMMIE Lee MD

## 2019-09-17 NOTE — NURSING NOTE
"Chief Complaint   Patient presents with     Derm Problem     f/u biopsy       Initial /60 (BP Location: Left arm, Patient Position: Chair, Cuff Size: Adult Regular)   Pulse 75   Temp 96.9  F (36.1  C) (Tympanic)   Ht 1.702 m (5' 7\")   Wt 92.1 kg (203 lb)   LMP  (LMP Unknown)   SpO2 98%   BMI 31.79 kg/m   Estimated body mass index is 31.79 kg/m  as calculated from the following:    Height as of this encounter: 1.702 m (5' 7\").    Weight as of this encounter: 92.1 kg (203 lb).  Medication Reconciliation: complete  CHRIST SINGH LPN    "

## 2019-09-18 NOTE — PROGRESS NOTES
Visit Date:   2019      SUBJECTIVE:  Kenn returns.  The biopsy showed evidence of lichen sclerosus.  She returns today to discuss this issue.  I believe that she does have morphea or  lichen sclerosis.  Both of these are inflammatory disorders followed by sclerosing or scarring down of the skin.  Fortunately, the lesions are not in any critical areas.  She does some genital activity, which indicates  lichen sclerosis there.      PLAN:  I advised Kenn that these two entities are very similar in histology and in the mystery of the causation.      Recommend that she continue the triamcinolone ointment to some of the newer lesions and/or could use more potent topical, which she was given for her genital lesions.  I have recommended that she see what happens and get back to me in perhaps 6 months or sooner.  Should she develop many new lesions, we might be able to do some intralesional work or I would review the literature to see if any new experimental therapies are present for this bothersome problem.      She seems quite accepting of the diagnosis and had no particular questions, but I will be happy to answer them should they arise.  Meds and allergies reviewed.         TOMMIE LEE MD             D: 2019   T: 2019   MT: MARGOT      Name:     KENN LAURENT   MRN:      [id number]        Account:      [id number]   :      1965           Visit Date:   2019      Document: [id number]

## 2019-12-02 DIAGNOSIS — E78.2 MIXED HYPERLIPIDEMIA: ICD-10-CM

## 2019-12-02 NOTE — PROGRESS NOTES
Subjective     Melba Hill is a 54 year old female who presents to clinic today for the following health issues:    HPI   Diabetes Follow-up    How often are you checking your blood sugar? A few times a month  What time of day are you checking your blood sugars (select all that apply)?  Before meals  Have you had any blood sugars above 200?  No  Have you had any blood sugars below 70?  Yes couple    What symptoms do you notice when your blood sugar is low?  Shaky, Dizzy and Weak    What concerns do you have today about your diabetes? None     Do you have any of these symptoms? (Select all that apply)  No numbness or tingling in feet.  No redness, sores or blisters on feet.  No complaints of excessive thirst.  No reports of blurry vision.  No significant changes to weight.     Have you had a diabetic eye exam in the last 12 months? No    BP Readings from Last 2 Encounters:   09/17/19 130/60   09/04/19 120/72     Hemoglobin A1C (%)   Date Value   09/04/2019 8.1 (H)   04/24/2019 7.5 (H)     LDL Cholesterol Calculated (mg/dL)   Date Value   08/22/2018 66   05/17/2017 52       Diabetes Management Resources    Hyperlipidemia Follow-Up      Are you regularly taking any medication or supplement to lower your cholesterol?   Yes- zocor    Are you having muscle aches or other side effects that you think could be caused by your cholesterol lowering medication?  No      How many servings of fruits and vegetables do you eat daily?  2-3    On average, how many sweetened beverages do you drink each day (Examples: soda, juice, sweet tea, etc.  Do NOT count diet or artificially sweetened beverages)?   0    How many days per week do you miss taking your medication? 0        Current Outpatient Medications   Medication Sig Dispense Refill     ACCU-CHEK FASTCLIX LANCETS MISC Please use fresh needle to check blood glucose three times a day 100 each 12     ACCU-CHEK SMARTVIEW test strip USE TO TEST BLOOD SUGARS THREE TIMES DAILY 100  "each 11     desoximetasone (TOPICORT LP) 0.05 % external cream Apply to outside of the affected Vulvar areas every night before sleep 60 g 3     lisinopril (PRINIVIL/ZESTRIL) 2.5 MG tablet Take 1 tablet (2.5 mg) by mouth daily 90 tablet 1     loratadine (CLARITIN) 10 MG tablet Take 10 mg by mouth daily as needed        Lutein 20 MG CAPS Take by mouth daily       MAGNESIUM OXIDE PO Take by mouth daily       metFORMIN (GLUCOPHAGE-XR) 500 MG 24 hr tablet TAKE 4 TABLETS (2,000 MG) BY MOUTH DAILY WITH DINNER. 360 tablet 1     NONFORMULARY Tumeric       NONFORMULARY Potassium 99 mg once daily       omega-3 acid ethyl esters (LOVAZA) 1 G capsule Take 2 g by mouth 2 times daily       omeprazole (PRILOSEC) 20 MG capsule Take by mouth daily       simvastatin (ZOCOR) 20 MG tablet TAKE 1 TABLET BY MOUTH NIGHTLY AT BEDTIME 90 tablet 0     sitagliptin (JANUVIA) 100 MG tablet TAKE 1 TABLET DAILY BY MOUTH 90 tablet 1     tiZANidine (ZANAFLEX) 4 MG tablet Take 1 tablet (4 mg) by mouth 3 times daily as needed for muscle spasms 30 tablet 2     triamcinolone (KENALOG) 0.025 % cream Apply topically 2 times daily Apply to sites of tenderness or redness once a day. 80 g 1     vitamin  B complex with vitamin C (VITAMIN  B COMPLEX) TABS Take 1 tablet by mouth daily       VITAMIN D, CHOLECALCIFEROL, PO Take 1,000 Units by mouth daily       Allergies   Allergen Reactions     Soap      Dove and ivory and dial     Sulfa Drugs Hives     Sulfonamide antibiotics           Reviewed and updated as needed this visit by Provider         Review of Systems   ROS COMP: Constitutional, HEENT, cardiovascular, pulmonary, gi and gu systems are negative, except as otherwise noted.      Objective    /68   Pulse 69   Temp 97.7  F (36.5  C)   Ht 1.702 m (5' 7\")   Wt 91.6 kg (202 lb)   LMP  (LMP Unknown)   SpO2 97%   BMI 31.64 kg/m    Body mass index is 31.64 kg/m .  Physical Exam   GENERAL: healthy, alert and no distress  NECK: no adenopathy, no " asymmetry, masses, or scars and thyroid normal to palpation  RESP: lungs clear to auscultation - no rales, rhonchi or wheezes  CV: regular rate and rhythm, normal S1 S2, no S3 or S4, no murmur, click or rub, no peripheral edema and peripheral pulses strong  ABDOMEN: soft, nontender, no hepatosplenomegaly, no masses and bowel sounds normal  MS: no gross musculoskeletal defects noted, no edema    Results for orders placed or performed in visit on 12/11/19   Comprehensive metabolic panel     Status: Abnormal   Result Value Ref Range    Sodium 141 133 - 144 mmol/L    Potassium 3.8 3.4 - 5.3 mmol/L    Chloride 110 (H) 94 - 109 mmol/L    Carbon Dioxide 26 20 - 32 mmol/L    Anion Gap 5 3 - 14 mmol/L    Glucose 133 (H) 70 - 99 mg/dL    Urea Nitrogen 21 7 - 30 mg/dL    Creatinine 0.78 0.52 - 1.04 mg/dL    GFR Estimate 84 >60 mL/min/[1.73_m2]    GFR Estimate If Black >90 >60 mL/min/[1.73_m2]    Calcium 9.5 8.5 - 10.1 mg/dL    Bilirubin Total 0.5 0.2 - 1.3 mg/dL    Albumin 3.8 3.4 - 5.0 g/dL    Protein Total 7.8 6.8 - 8.8 g/dL    Alkaline Phosphatase 134 40 - 150 U/L    ALT 70 (H) 0 - 50 U/L    AST 57 (H) 0 - 45 U/L   Albumin Random Urine Quantitative with Creat Ratio     Status: Abnormal   Result Value Ref Range    Creatinine Urine 134 mg/dL    Albumin Urine mg/L 114 mg/L    Albumin Urine mg/g Cr 85.07 (H) 0 - 25 mg/g Cr   Lipid Profile     Status: Abnormal   Result Value Ref Range    Cholesterol 202 (H) <200 mg/dL    Triglycerides 191 (H) <150 mg/dL    HDL Cholesterol 39 (L) >49 mg/dL    LDL Cholesterol Calculated 125 (H) <100 mg/dL    Non HDL Cholesterol 163 (H) <130 mg/dL   Hemoglobin A1c     Status: Abnormal   Result Value Ref Range    Hemoglobin A1C 8.0 (H) 0 - 5.6 %   Estimated Average Glucose     Status: None   Result Value Ref Range    Estimated Average Glucose 183 mg/dL             Assessment & Plan     1. Type 2 diabetes mellitus without complication, without long-term current use of insulin (H)  Still suboptimal  -  "will restart Januvia - was on it and stopped due to vaginal issue that was NOT a side effect.  Needs to see eye doctor.  F/u in 4 months   - Hemoglobin A1c; Future  - Lipid Profile; Future  - Albumin Random Urine Quantitative with Creat Ratio; Future  - Comprehensive metabolic panel; Future  - sitagliptin (JANUVIA) 100 MG tablet; TAKE 1 TABLET DAILY BY MOUTH  Dispense: 90 tablet; Refill: 1  - metFORMIN (GLUCOPHAGE-XR) 500 MG 24 hr tablet; TAKE 4 TABLETS (2,000 MG) BY MOUTH DAILY WITH DINNER.  Dispense: 360 tablet; Refill: 1    2. Mixed hyperlipidemia  Lipids up - DID eat some today-- on a statin. Recheck in 4 months fasting  - Lipid Profile; Future  - Comprehensive metabolic panel; Future    3. JIMÉNEZ (nonalcoholic steatohepatitis)  Mild - wt loss discussed     4. Microalbuminuria due to type 2 diabetes mellitus (H)  Will add low dose ACEI-- R/B discussed.   - lisinopril (PRINIVIL/ZESTRIL) 2.5 MG tablet; Take 1 tablet (2.5 mg) by mouth daily  Dispense: 90 tablet; Refill: 1     BMI:   Estimated body mass index is 31.64 kg/m  as calculated from the following:    Height as of this encounter: 1.702 m (5' 7\").    Weight as of this encounter: 91.6 kg (202 lb).   Weight management plan: Discussed healthy diet and exercise guidelines            No follow-ups on file.    Alvin Connolly MD  [facility] [place]      "

## 2019-12-06 RX ORDER — SIMVASTATIN 20 MG
TABLET ORAL
Qty: 90 TABLET | Refills: 0 | Status: SHIPPED | OUTPATIENT
Start: 2019-12-06 | End: 2020-03-02

## 2019-12-08 DIAGNOSIS — E11.9 TYPE 2 DIABETES MELLITUS WITHOUT COMPLICATION, WITHOUT LONG-TERM CURRENT USE OF INSULIN (H): ICD-10-CM

## 2019-12-10 RX ORDER — METFORMIN HCL 500 MG
TABLET, EXTENDED RELEASE 24 HR ORAL
Qty: 360 TABLET | Refills: 0 | Status: SHIPPED | OUTPATIENT
Start: 2019-12-10 | End: 2019-12-11

## 2019-12-11 ENCOUNTER — OFFICE VISIT (OUTPATIENT)
Dept: FAMILY MEDICINE | Facility: OTHER | Age: 54
End: 2019-12-11
Attending: FAMILY MEDICINE
Payer: COMMERCIAL

## 2019-12-11 VITALS
BODY MASS INDEX: 31.71 KG/M2 | DIASTOLIC BLOOD PRESSURE: 68 MMHG | OXYGEN SATURATION: 97 % | WEIGHT: 202 LBS | SYSTOLIC BLOOD PRESSURE: 122 MMHG | TEMPERATURE: 97.7 F | HEART RATE: 69 BPM | HEIGHT: 67 IN

## 2019-12-11 DIAGNOSIS — R80.9 MICROALBUMINURIA DUE TO TYPE 2 DIABETES MELLITUS (H): ICD-10-CM

## 2019-12-11 DIAGNOSIS — E11.9 TYPE 2 DIABETES MELLITUS WITHOUT COMPLICATION, WITHOUT LONG-TERM CURRENT USE OF INSULIN (H): Primary | ICD-10-CM

## 2019-12-11 DIAGNOSIS — E78.2 MIXED HYPERLIPIDEMIA: ICD-10-CM

## 2019-12-11 DIAGNOSIS — E11.9 TYPE 2 DIABETES MELLITUS WITHOUT COMPLICATION, WITHOUT LONG-TERM CURRENT USE OF INSULIN (H): ICD-10-CM

## 2019-12-11 DIAGNOSIS — K75.81 NASH (NONALCOHOLIC STEATOHEPATITIS): ICD-10-CM

## 2019-12-11 DIAGNOSIS — E11.29 MICROALBUMINURIA DUE TO TYPE 2 DIABETES MELLITUS (H): ICD-10-CM

## 2019-12-11 LAB
ALBUMIN SERPL-MCNC: 3.8 G/DL (ref 3.4–5)
ALP SERPL-CCNC: 134 U/L (ref 40–150)
ALT SERPL W P-5'-P-CCNC: 70 U/L (ref 0–50)
ANION GAP SERPL CALCULATED.3IONS-SCNC: 5 MMOL/L (ref 3–14)
AST SERPL W P-5'-P-CCNC: 57 U/L (ref 0–45)
BILIRUB SERPL-MCNC: 0.5 MG/DL (ref 0.2–1.3)
BUN SERPL-MCNC: 21 MG/DL (ref 7–30)
CALCIUM SERPL-MCNC: 9.5 MG/DL (ref 8.5–10.1)
CHLORIDE SERPL-SCNC: 110 MMOL/L (ref 94–109)
CHOLEST SERPL-MCNC: 202 MG/DL
CO2 SERPL-SCNC: 26 MMOL/L (ref 20–32)
CREAT SERPL-MCNC: 0.78 MG/DL (ref 0.52–1.04)
CREAT UR-MCNC: 134 MG/DL
EST. AVERAGE GLUCOSE BLD GHB EST-MCNC: 183 MG/DL
GFR SERPL CREATININE-BSD FRML MDRD: 84 ML/MIN/{1.73_M2}
GLUCOSE SERPL-MCNC: 133 MG/DL (ref 70–99)
HBA1C MFR BLD: 8 % (ref 0–5.6)
HDLC SERPL-MCNC: 39 MG/DL
LDLC SERPL CALC-MCNC: 125 MG/DL
MICROALBUMIN UR-MCNC: 114 MG/L
MICROALBUMIN/CREAT UR: 85.07 MG/G CR (ref 0–25)
NONHDLC SERPL-MCNC: 163 MG/DL
POTASSIUM SERPL-SCNC: 3.8 MMOL/L (ref 3.4–5.3)
PROT SERPL-MCNC: 7.8 G/DL (ref 6.8–8.8)
SODIUM SERPL-SCNC: 141 MMOL/L (ref 133–144)
TRIGL SERPL-MCNC: 191 MG/DL

## 2019-12-11 PROCEDURE — 80053 COMPREHEN METABOLIC PANEL: CPT | Performed by: FAMILY MEDICINE

## 2019-12-11 PROCEDURE — 99214 OFFICE O/P EST MOD 30 MIN: CPT | Performed by: FAMILY MEDICINE

## 2019-12-11 PROCEDURE — 83036 HEMOGLOBIN GLYCOSYLATED A1C: CPT | Performed by: FAMILY MEDICINE

## 2019-12-11 PROCEDURE — 36415 COLL VENOUS BLD VENIPUNCTURE: CPT | Performed by: FAMILY MEDICINE

## 2019-12-11 PROCEDURE — 80061 LIPID PANEL: CPT | Performed by: FAMILY MEDICINE

## 2019-12-11 PROCEDURE — 82043 UR ALBUMIN QUANTITATIVE: CPT | Performed by: FAMILY MEDICINE

## 2019-12-11 RX ORDER — METFORMIN HCL 500 MG
TABLET, EXTENDED RELEASE 24 HR ORAL
Qty: 360 TABLET | Refills: 1 | Status: SHIPPED | OUTPATIENT
Start: 2019-12-11 | End: 2020-05-28

## 2019-12-11 RX ORDER — LISINOPRIL 2.5 MG/1
2.5 TABLET ORAL DAILY
Qty: 90 TABLET | Refills: 1 | Status: SHIPPED | OUTPATIENT
Start: 2019-12-11 | End: 2020-05-28

## 2019-12-11 ASSESSMENT — MIFFLIN-ST. JEOR: SCORE: 1538.9

## 2019-12-11 ASSESSMENT — ANXIETY QUESTIONNAIRES
4. TROUBLE RELAXING: SEVERAL DAYS
5. BEING SO RESTLESS THAT IT IS HARD TO SIT STILL: NOT AT ALL
7. FEELING AFRAID AS IF SOMETHING AWFUL MIGHT HAPPEN: NOT AT ALL
IF YOU CHECKED OFF ANY PROBLEMS ON THIS QUESTIONNAIRE, HOW DIFFICULT HAVE THESE PROBLEMS MADE IT FOR YOU TO DO YOUR WORK, TAKE CARE OF THINGS AT HOME, OR GET ALONG WITH OTHER PEOPLE: SOMEWHAT DIFFICULT
6. BECOMING EASILY ANNOYED OR IRRITABLE: SEVERAL DAYS
2. NOT BEING ABLE TO STOP OR CONTROL WORRYING: MORE THAN HALF THE DAYS
1. FEELING NERVOUS, ANXIOUS, OR ON EDGE: SEVERAL DAYS
3. WORRYING TOO MUCH ABOUT DIFFERENT THINGS: MORE THAN HALF THE DAYS
GAD7 TOTAL SCORE: 7

## 2019-12-11 ASSESSMENT — PAIN SCALES - GENERAL: PAINLEVEL: NO PAIN (0)

## 2019-12-11 ASSESSMENT — PATIENT HEALTH QUESTIONNAIRE - PHQ9: SUM OF ALL RESPONSES TO PHQ QUESTIONS 1-9: 5

## 2019-12-11 NOTE — NURSING NOTE
"Chief Complaint   Patient presents with     Diabetes       Initial /68   Pulse 69   Temp 97.7  F (36.5  C)   Ht 1.702 m (5' 7\")   Wt 91.6 kg (202 lb)   LMP  (LMP Unknown)   SpO2 97%   BMI 31.64 kg/m   Estimated body mass index is 31.64 kg/m  as calculated from the following:    Height as of this encounter: 1.702 m (5' 7\").    Weight as of this encounter: 91.6 kg (202 lb).  Medication Reconciliation: complete  Vikas Leggett LPN  "

## 2019-12-12 ASSESSMENT — ANXIETY QUESTIONNAIRES: GAD7 TOTAL SCORE: 7

## 2020-01-27 DIAGNOSIS — M51.369 DDD (DEGENERATIVE DISC DISEASE), LUMBAR: ICD-10-CM

## 2020-01-27 NOTE — TELEPHONE ENCOUNTER
tiZANidine (ZANAFLEX) 4 MG tablet      Last Written Prescription Date:  03/20/18  Last Fill Quantity: 30,   # refills: 2  Last Office Visit: 12/11/19  Future Office visit:    Next 5 appointments (look out 90 days)    Apr 15, 2020  7:45 AM CDT  (Arrive by 7:30 AM)  SHORT with Alvin Connolly MD  [facility] ([facility] ) [address]  [place] [address]  [phone]           Routing refill request to provider for review/approval because:  Drug not on the [facility], [facility] or [facility] refill protocol or controlled substance

## 2020-02-11 NOTE — PROGRESS NOTES
"Subjective     Melba Hill is a 54 year old female who presents to clinic today for the following health issues:    HPI   Musculoskeletal problem/pain      Duration: ***    Description  Location: ***    Intensity:  {mild,moderate,severe:044573}    Accompanying signs and symptoms: {OTHER MS SYMPTOMS:739695::\"none\"}    History  Previous similar problem: { :207485}  Previous evaluation:  {PREVIOUS ms evaluation:232078::\"none\"}    Precipitating or alleviating factors:  Trauma or overuse: { :695009}  Aggravating factors include: {AGGRAVATING MS FACTORS CHRONIC PROB:288052::\"none\"}    Therapies tried and outcome: {MS RELIEF ITEMS:755727::\"nothing\"}    {additonal problems for provider to add (Optional):462940}    {HIST REVIEW/ LINKS 2 (Optional):507647}    {Additional problems for the provider to add (optional):866782}  Reviewed and updated as needed this visit by Provider         Review of Systems   {ROS COMP (Optional):170881}      Objective    LMP  (LMP Unknown)   There is no height or weight on file to calculate BMI.  Physical Exam   {Exam List (Optional):258664}    {Diagnostic Test Results (Optional):499928::\"Diagnostic Test Results:\",\"Labs reviewed in Epic\"}        {PROVIDER CHARTING PREFERENCE:110836}      "

## 2020-02-12 ENCOUNTER — ANCILLARY PROCEDURE (OUTPATIENT)
Dept: GENERAL RADIOLOGY | Facility: OTHER | Age: 55
End: 2020-02-12
Attending: FAMILY MEDICINE
Payer: OTHER MISCELLANEOUS

## 2020-02-12 ENCOUNTER — OFFICE VISIT (OUTPATIENT)
Dept: FAMILY MEDICINE | Facility: OTHER | Age: 55
End: 2020-02-12
Attending: FAMILY MEDICINE
Payer: OTHER MISCELLANEOUS

## 2020-02-12 VITALS
DIASTOLIC BLOOD PRESSURE: 68 MMHG | HEART RATE: 86 BPM | WEIGHT: 198 LBS | HEIGHT: 67 IN | BODY MASS INDEX: 31.08 KG/M2 | TEMPERATURE: 98.5 F | OXYGEN SATURATION: 97 % | SYSTOLIC BLOOD PRESSURE: 114 MMHG

## 2020-02-12 DIAGNOSIS — M25.552 HIP PAIN, LEFT: ICD-10-CM

## 2020-02-12 DIAGNOSIS — M54.41 CHRONIC RIGHT-SIDED LOW BACK PAIN WITH RIGHT-SIDED SCIATICA: ICD-10-CM

## 2020-02-12 DIAGNOSIS — G89.29 CHRONIC RIGHT-SIDED LOW BACK PAIN WITH RIGHT-SIDED SCIATICA: ICD-10-CM

## 2020-02-12 DIAGNOSIS — G89.29 CHRONIC RIGHT-SIDED LOW BACK PAIN WITH RIGHT-SIDED SCIATICA: Primary | ICD-10-CM

## 2020-02-12 DIAGNOSIS — M54.41 CHRONIC RIGHT-SIDED LOW BACK PAIN WITH RIGHT-SIDED SCIATICA: Primary | ICD-10-CM

## 2020-02-12 PROCEDURE — 99215 OFFICE O/P EST HI 40 MIN: CPT | Performed by: FAMILY MEDICINE

## 2020-02-12 PROCEDURE — 72170 X-RAY EXAM OF PELVIS: CPT | Mod: TC

## 2020-02-12 PROCEDURE — 72100 X-RAY EXAM L-S SPINE 2/3 VWS: CPT | Mod: TC

## 2020-02-12 ASSESSMENT — MIFFLIN-ST. JEOR: SCORE: 1520.75

## 2020-02-12 ASSESSMENT — PAIN SCALES - GENERAL: PAINLEVEL: MODERATE PAIN (4)

## 2020-02-12 NOTE — PROGRESS NOTES
Occupational Visit     SUBJECTIVE:  Melba Hill, 54 year old, female is seen for new evaluation and treatment of occupational injury. Date of injury is march 2019.    Linked Episodes   Type: Episode: Status: Noted: Resolved: Last update: Updated by:   WORK COMP back injury Active 3/1/2019  2/12/2020  2:55 PM Vikas Leggett LPN      Comments:       Musculoskeletal problem/pain      Duration: March 2019--- I have never seen for this and has been going on and off to DR Christian Leggett Chiropractor in [place]     Description  Location: low back in middle and travel to right hip    Intensity:  moderate    Accompanying signs and symptoms: radiation of pain to right hip and leg, numbness and tingling    History  Previous similar problem: no   Previous evaluation:  none    Precipitating or alleviating factors:  Trauma or overuse: YES- 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   Using Ibu on regular basis 2 tabs 3times a day  Chiropractor help somewhat  Has taken pt off work for 2 weeks til 2/22/20-- work could not accomodate light duty    Case was on work comp initially and now work comp denying all recent claims    No XR or MRI of back          Allergies   Allergen Reactions     Soap      Dove and ivory and dial     Sulfa Drugs Hives     Sulfonamide antibiotics           Review of Systems:  CONSTITUTIONAL:NEGATIVE for fever, chills, change in weight      OBJECTIVE:  Vitals:    02/12/20 1456    BP: 114/68   Pulse: 86   Temp: 98.5  F (36.9  C)   SpO2: 97%               Exam:  GENERAL: healthy, alert, well nourished, well hydrated, no distress  MS: extremities- no gross deformities noted, no edema  Negative EDWARD/SLR and strength and reflexes equal   NEURO: strength and tone- normal, sensory exam- grossly normal, mentation- intact, speech- normal, reflexes- symmetric  BACK: no CVA tenderness, mild right paralumbar tenderness/ deep lower midline tenderness       Labs: No results found for this or any previous visit (from the past 24 hour(s)).  Results for orders placed or performed in visit on 02/12/20   XR Lumbar Spine 2/3 Views     Status: None    Narrative    Exam: XR LUMBAR SPINE 2-3 VIEWS     History:Female, age 54 years, Chronic right-sided low back pain with  right-sided sciatica; Chronic right-sided low back pain with  right-sided sciatica; Hip pain, left    Comparison:  None    Technique: Three views are submitted.    Findings: Bones are normally mineralized. No evidence of acute or  subacute fracture. No evidence of acute subluxation.           Impression    Impression:  1.  No evidence of acute or subacute bony abnormality.     2.  Moderate multilevel degenerative change.    DISHA SAUCEDO MD   Results for orders placed or performed in visit on 02/12/20   XR Pelvis 1/2 Views     Status: None    Narrative    XR PELVIS 1/2 VW, 2/12/2020 3:46 PM    History: Female, age 54 years; Chronic right-sided low back pain with  right-sided sciatica; Chronic right-sided low back pain with  right-sided sciatica; Hip pain, left    Comparison: None.    Technique: Single view the pelvis was obtained.    FINDINGS: The bones are mildly osteopenic The bony pelvis and  visualized portions of the hips appear grossly intact. No evidence of  acute fracture or acute dislocation.      Impression    IMPRESSION:   1.  No distinct evidence of acute or subacute bony abnormality.     2.  Mild osteopenia.    DISHA SAUCEDO  MD         ASSESSMENT/PLAN:  (M54.41,  G89.29) Chronic right-sided low back pain with right-sided sciatica  (primary encounter diagnosis)  Comment: Discussed that chiropractor has not helped lately and would recommend PT /trial of schedule BID Relafen/ has flexeril/  And would recommend MRI  Plan: XR Pelvis 1/2 Views, XR Lumbar Spine 2/3 Views,        MR Lumbar Spine w/o Contrast, PHYSICAL THERAPY         REFERRAL, nabumetone (RELAFEN) 750 MG tablet        Pt is not working for 2 weeks by her chiropractor and I will not say differently but I would ask her to have conversation with me to maybe have me take over her cares and try different directions. 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  She agreed.  She need to clarify her work comp situation -- it may of dropped her claim. This discussed in detail Discussed behavioral changes and proper body mechanics needed to help control patient's back pain.   Discussed in length conservative measures of OTC medications for pain, Ice/Heat, elevation and the concept of R.I.C.E.. Continue behavioral changes and proper body mechanics to allow for healing. Follow up as directed.         (M25.253) Hip pain, left  Comment: compensating for right side most likely. Does have some muscular asymmetry on left side with more muscle mass vs right side   Plan: XR Pelvis 1/2 Views, XR Lumbar Spine 2/3 Views,        PHYSICAL THERAPY REFERRAL        Discussed in length conservative measures of OTC medications for pain, Ice/Heat, elevation and the concept of R.I.C.E.. Continue behavioral changes and proper body mechanics to allow for healing. Follow up as directed.     F/u in 2-3 weeks     45 minutes was spent with patient and over 50%  of this time was spent on counseling patient regarding  illness, medication and / or treatment  options, coordinating further cares and follow ups that are needed along with resource material that will be helpful in the treatment of these issues.  Lots of  time spent on the work comp process and treatment plan and if her claim is /was denied.  FMLA forms filled out

## 2020-02-12 NOTE — NURSING NOTE
"Chief Complaint   Patient presents with     Work Comp       Initial /68   Pulse 86   Temp 98.5  F (36.9  C)   Ht 1.702 m (5' 7\")   Wt 89.8 kg (198 lb)   LMP  (LMP Unknown)   SpO2 97%   BMI 31.01 kg/m   Estimated body mass index is 31.01 kg/m  as calculated from the following:    Height as of this encounter: 1.702 m (5' 7\").    Weight as of this encounter: 89.8 kg (198 lb).  Medication Reconciliation: complete  Vikas Leggett LPN  "

## 2020-02-21 ENCOUNTER — TELEPHONE (OUTPATIENT)
Dept: FAMILY MEDICINE | Facility: OTHER | Age: 55
End: 2020-02-21

## 2020-02-21 NOTE — PROGRESS NOTES
Occupational Visit     SUBJECTIVE:  Melba Hill, 55 year old, female is seen for follow up of occupational injury. Date of injury is 1/31/2019.    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  THEREFORE LAST NOTE WAS SOMEWHAT INCORRECT ON THE TIME OF INJURY     Description  Location: low back     Intensity:  moderate    Accompanying signs and symptoms: radiation of pain to right leg    History  Previous similar problem: YES  Previous evaluation:  x-ray    Precipitating or alleviating factors:  Trauma or overuse: YES- work injury  Aggravating factors include: standing and walking    Therapies tried and outcome: ice, Ibuprofen and chiropractor    HAS talked with work comp insurance and says work comp says will approve MRI and PT.   Pt talked with work comp carrier today and said it will be approved.     Relafen is helping some or more  Flexeril - prn - make her sleepy     Pt will have me take over her care and this was told to her Chiropractor  Chiropractor has taken her out of work til March 8th - previous to this was place on light duty and her work place would not allow that.  Pt is a CNA            Allergies   Allergen Reactions     Soap      Dove and ivory and dial     Sulfa Drugs Hives     Sulfonamide antibiotics           Review of Systems:  CONSTITUTIONAL:NEGATIVE for fever, chills, change in weight      OBJECTIVE:  There were no vitals filed for this visit.            Exam:  GENERAL:: healthy, alert and no distress  BACK: no CVA tenderness,  paralumbar tenderness/ some better movement       Labs: No results found for this or any previous visit (from the past 24 hour(s)).      ASSESSMENT/PLAN:    ICD-10-CM    1. DDD (degenerative disc disease), lumbar M51.36    2. Chronic right-sided low back pain with right-sided sciatica M54.41     G89.29      Has PT next week  Hope she is correct and  will be approved for MRI  Continue Relafen and flexeril at night  Stop chiropractor  Chiropractor off work til 3/8--- then start her on light work  See her the week of 3/15  Discussed behavioral changes and proper body mechanics needed to help control patient's back pain.   Symptomatic treatment was discussed along when patient should call and/or come back into the clinic or go to ER/Urgent care. All questions answered.   MRI positive for disk which I feel she will have disk herniation around l4-5 or l5-S1-- consider DMITRIY if PT not helping     There are no diagnoses linked to this encounter.

## 2020-02-21 NOTE — TELEPHONE ENCOUNTER
Forms refaxed patient notified.  
Patient calling and needs FMLA forms refaxed. Per pt her work place did not get them.    Questions can call [phone]        
No

## 2020-02-28 ENCOUNTER — OFFICE VISIT (OUTPATIENT)
Dept: FAMILY MEDICINE | Facility: OTHER | Age: 55
End: 2020-02-28
Attending: FAMILY MEDICINE
Payer: OTHER MISCELLANEOUS

## 2020-02-28 VITALS
DIASTOLIC BLOOD PRESSURE: 74 MMHG | TEMPERATURE: 97.4 F | HEIGHT: 67 IN | BODY MASS INDEX: 31.08 KG/M2 | OXYGEN SATURATION: 98 % | HEART RATE: 92 BPM | SYSTOLIC BLOOD PRESSURE: 134 MMHG | WEIGHT: 198 LBS

## 2020-02-28 DIAGNOSIS — M54.41 CHRONIC RIGHT-SIDED LOW BACK PAIN WITH RIGHT-SIDED SCIATICA: ICD-10-CM

## 2020-02-28 DIAGNOSIS — G89.29 CHRONIC RIGHT-SIDED LOW BACK PAIN WITH RIGHT-SIDED SCIATICA: ICD-10-CM

## 2020-02-28 DIAGNOSIS — M51.369 DDD (DEGENERATIVE DISC DISEASE), LUMBAR: Primary | ICD-10-CM

## 2020-02-28 PROCEDURE — 99213 OFFICE O/P EST LOW 20 MIN: CPT | Performed by: FAMILY MEDICINE

## 2020-02-28 ASSESSMENT — PAIN SCALES - GENERAL: PAINLEVEL: SEVERE PAIN (7)

## 2020-02-28 ASSESSMENT — MIFFLIN-ST. JEOR: SCORE: 1515.75

## 2020-02-28 NOTE — NURSING NOTE
"Chief Complaint   Patient presents with     Back Pain       Initial /74   Pulse 92   Temp 97.4  F (36.3  C)   Ht 1.702 m (5' 7\")   Wt 89.8 kg (198 lb)   LMP  (LMP Unknown)   SpO2 98%   BMI 31.01 kg/m   Estimated body mass index is 31.01 kg/m  as calculated from the following:    Height as of this encounter: 1.702 m (5' 7\").    Weight as of this encounter: 89.8 kg (198 lb).  Medication Reconciliation: complete  Vikas Leggett LPN  "

## 2020-03-01 DIAGNOSIS — E78.2 MIXED HYPERLIPIDEMIA: ICD-10-CM

## 2020-03-02 ENCOUNTER — HEALTH MAINTENANCE LETTER (OUTPATIENT)
Age: 55
End: 2020-03-02

## 2020-03-02 RX ORDER — SIMVASTATIN 20 MG
TABLET ORAL
Qty: 90 TABLET | Refills: 1 | Status: SHIPPED | OUTPATIENT
Start: 2020-03-02 | End: 2020-08-25

## 2020-03-10 ENCOUNTER — HOSPITAL ENCOUNTER (OUTPATIENT)
Dept: MRI IMAGING | Facility: HOSPITAL | Age: 55
Discharge: HOME | End: 2020-03-10
Attending: FAMILY MEDICINE | Admitting: FAMILY MEDICINE
Payer: OTHER MISCELLANEOUS

## 2020-03-10 DIAGNOSIS — G89.29 CHRONIC RIGHT-SIDED LOW BACK PAIN WITH RIGHT-SIDED SCIATICA: ICD-10-CM

## 2020-03-10 DIAGNOSIS — M54.41 CHRONIC RIGHT-SIDED LOW BACK PAIN WITH RIGHT-SIDED SCIATICA: ICD-10-CM

## 2020-03-10 PROCEDURE — 72148 MRI LUMBAR SPINE W/O DYE: CPT | Mod: TC

## 2020-03-18 ENCOUNTER — VIRTUAL VISIT (OUTPATIENT)
Dept: FAMILY MEDICINE | Facility: OTHER | Age: 55
End: 2020-03-18
Attending: FAMILY MEDICINE
Payer: OTHER MISCELLANEOUS

## 2020-03-18 VITALS — TEMPERATURE: 97.5 F | BODY MASS INDEX: 30.85 KG/M2 | WEIGHT: 197 LBS

## 2020-03-18 DIAGNOSIS — M54.41 CHRONIC RIGHT-SIDED LOW BACK PAIN WITH RIGHT-SIDED SCIATICA: ICD-10-CM

## 2020-03-18 DIAGNOSIS — M51.369 DDD (DEGENERATIVE DISC DISEASE), LUMBAR: ICD-10-CM

## 2020-03-18 DIAGNOSIS — M51.26 HERNIATION OF RIGHT SIDE OF L4-L5 INTERVERTEBRAL DISC: ICD-10-CM

## 2020-03-18 DIAGNOSIS — G89.29 CHRONIC RIGHT-SIDED LOW BACK PAIN WITH RIGHT-SIDED SCIATICA: ICD-10-CM

## 2020-03-18 DIAGNOSIS — M51.27 HERNIATED NUCLEUS PULPOSUS, L5-S1, RIGHT: Primary | ICD-10-CM

## 2020-03-18 PROCEDURE — 99443 ZZC PHYSICIAN TELEPHONE EVALUATION 21-30 MIN: CPT | Performed by: FAMILY MEDICINE

## 2020-03-18 RX ORDER — PREDNISONE 20 MG/1
TABLET ORAL
Qty: 14 TABLET | Refills: 0 | Status: SHIPPED | OUTPATIENT
Start: 2020-03-18 | End: 2020-04-06

## 2020-03-18 ASSESSMENT — PAIN SCALES - GENERAL: PAINLEVEL: MODERATE PAIN (4)

## 2020-03-18 NOTE — PROGRESS NOTES
"Melba Hill is a 55 year old female who is being evaluated via a billable telephone visit.      Telephone visit due to Covid 19 clinic restrictions     The patient has been notified of following:     \"This telephone visit will be conducted via a call between you and your physician/provider. We have found that certain health care needs can be provided without the need for a physical exam.  This service lets us provide the care you need with a short phone conversation.  If a prescription is necessary we can send it directly to your pharmacy.  If lab work is needed we can place an order for that and you can then stop by our lab to have the test done at a later time.    If during the course of the call the physician/provider feels a telephone visit is not appropriate, you will not be charged for this service.\"       Melba Hill complains of  No chief complaint on file.      I have reviewed and updated the patient's Past Medical History, Social History, Family History and Medication List.    ALLERGIES  Soap and Sulfa drugs    Marilia Munoz LPN   (MA signature)    Occupational Visit     SUBJECTIVE:  Melba Hill, 55 year old, female is seen for follow up of occupational injury. Date of injury is 1/31/2019.    No linked episodes    Musculoskeletal problem/pain      Duration: 1/31/2019    Description  Location: Low back    Intensity:  Moderate 4/10    Accompanying signs and symptoms: radiation of pain to R leg, numbness and tingling    History  Previous similar problem: YES  Previous evaluation:  x-ray and MRI    Precipitating or alleviating factors:  Trauma or overuse: YES  Aggravating factors include: standing, walking, climbing stairs, overuse     Therapies tried and outcome: Ice medication    Had to cancer her PT visit due to work comp not approving it by the time of PT  Work Comp approved MRI     Back pain is about the same-- good days bad days - due to how much activity  Stairs hard due to sciatica " pain  Walking and standing painful in low back   Pain in leg and not so much weakness of leg   Can not work with light duty -- employer says will only allow her to work with no restrictions.---pt is a CNA       PROCEDURE: MR LUMBAR SPINE W/O CONTRAST 3/10/2020 6:51 PM     HISTORY: right sciatica; Chronic right-sided low back pain with  right-sided sciatica; Chronic right-sided low back pain with  right-sided sciatica     COMPARISONS: None.     Meds/Dose Given:     TECHNIQUE: Images were obtained sagittally T1 STIR and T2 weighted.  Images were obtained axially proton density and T2-weighted     FINDINGS: There is some broad-based annular bulging at T11-T12 and  T12-L1 mildly flattening the ventral aspect of the subarachnoid space.     There is asymmetric disc protrusion at L1-L2 on the left compressing  the left side of the subarachnoid space and compressing the left L2  nerve root and mildly.     The L2-L3 disc is normal height without evidence of disc herniation or  protrusion     There are moderate facet joint degenerative changes with  nonspondylolytic spondylolisthesis of L3 on L4. There is 4 mm of  forward subluxation of L3 on L4. There is mild central spinal  stenosis. There is mild compression of the left L4 nerve root.     There is broad-based annular bulging developmentally short pedicles at  L4-L5 there is a disc protrusion which is asymmetric greater on the  right than the left. This mildly compresses the right L5 nerve root in  the lateral recess     There is asymmetric disc protrusion at L5-S1 centrally and to the  right. This impinges on the right S1 nerve root moderately.     Intradurally the nerves of the cauda equina and conus are normal. The  paravertebral soft tissues appear normal.                                                                        IMPRESSION: Asymmetric disc protrusion L1-L2 on the left mildly  impinging on the left L2 nerve root.     Asymmetric disc protrusion L4-L5 on the  left mildly compressing the  left L5 nerve root.     Asymmetric disc protrusion L5-S1 on the right moderately compressing  the right S1 nerve root     RJ LARSEN MD  Allergies   Allergen Reactions     Soap      Dove and ivory and dial     Sulfa Drugs Hives     Sulfonamide antibiotics           Review of Systems:  CONSTITUTIONAL:NEGATIVE for fever, chills, change in weight      OBJECTIVE:  There were no vitals filed for this visit.            Exam:  GENERAL:: healthy, alert and no distress  PSYCH: Alert and oriented times 3; speech- coherent , normal rate and volume; able to articulate logical thoughts, able to abstract reason, no tangential thoughts, no hallucinations or delusions, affect- normal      Labs: No results found for this or any previous visit (from the past 24 hour(s)).      ASSESSMENT/PLAN:  There are no diagnoses linked to this encounter.      Assessment/Plan:    ICD-10-CM    1. Herniated nucleus pulposus, L5-S1, right  M51.27 predniSONE (DELTASONE) 20 MG tablet     IR Consultation for IR Exam (Pain Consult)   2. Herniation of right side of L4-L5 intervertebral disc  M51.26 predniSONE (DELTASONE) 20 MG tablet     IR Consultation for IR Exam (Pain Consult)   3. DDD (degenerative disc disease), lumbar  M51.36 predniSONE (DELTASONE) 20 MG tablet     IR Consultation for IR Exam (Pain Consult)   4. Chronic right-sided low back pain with right-sided sciatica  M54.41 predniSONE (DELTASONE) 20 MG tablet    G89.29 IR Consultation for IR Exam (Pain Consult)     Went over MRI  Culprit is probably L5-S1 but still may be L4-5 level  Will try to see where she is at with PT approval and right now PT are seeing some pt with Covid 19 lurking  Will order steroid burst and taper.   Risk and benefits of steroids was discussed and verbal consent to proceed was given.   Will refer for IR pain management for to get approved if PT and oral steroids do not work.   Due to Covid 19-- all DMITRIY injections are on hold till  further notice even if got approved right away.   F/u by phone in 2-3 weeks.   Same restrictions- light duty -- employer will not allow any restrictions - which is a shame   Discussed behavioral changes and proper body mechanics needed to help control patient's back pain. ;      I have reviewed the note as documented above.  This accurately captures the substance of my conversation with the patient.  Alvin Connolly MD      Phone call contact time  Call Started at 1430  Call Ended at 1455    Alvin Connolly MD

## 2020-03-18 NOTE — Clinical Note
Follow up 3 weeks - regular visit and can convert to Tele if necessary     Check on PT  Refer for IR pain mgmt     All under work comp

## 2020-04-02 ENCOUNTER — MYC MEDICAL ADVICE (OUTPATIENT)
Dept: FAMILY MEDICINE | Facility: OTHER | Age: 55
End: 2020-04-02

## 2020-04-02 DIAGNOSIS — N90.4 VULVAR DYSTROPHY: ICD-10-CM

## 2020-04-02 NOTE — TELEPHONE ENCOUNTER
Could you do work comp  Ability for patient ? Had virtual visit 3/18/2020.   Please advise.  Chantelle CARIAS LPN

## 2020-04-03 ENCOUNTER — TELEPHONE (OUTPATIENT)
Dept: FAMILY MEDICINE | Facility: OTHER | Age: 55
End: 2020-04-03

## 2020-04-03 RX ORDER — DESOXIMETASONE 0.5 MG/G
CREAM TOPICAL
Qty: 60 G | Refills: 3 | Status: SHIPPED | OUTPATIENT
Start: 2020-04-03 | End: 2021-06-18

## 2020-04-03 NOTE — TELEPHONE ENCOUNTER
Sean from [facility] called regarding pts telephone visit that will be on 4/6/20 wondering if he can be part of the phone call as a conference call. Please advise and get back to him at [phone]

## 2020-04-07 ENCOUNTER — HOSPITAL ENCOUNTER (OUTPATIENT)
Dept: INTERVENTIONAL RADIOLOGY/VASCULAR | Facility: HOSPITAL | Age: 55
Discharge: HOME | End: 2020-04-07
Attending: FAMILY MEDICINE | Admitting: FAMILY MEDICINE
Payer: OTHER MISCELLANEOUS

## 2020-04-07 ENCOUNTER — TELEPHONE (OUTPATIENT)
Dept: GENERAL RADIOLOGY | Facility: HOSPITAL | Age: 55
End: 2020-04-07

## 2020-04-07 DIAGNOSIS — M54.41 CHRONIC RIGHT-SIDED LOW BACK PAIN WITH RIGHT-SIDED SCIATICA: ICD-10-CM

## 2020-04-07 DIAGNOSIS — M51.26 HERNIATION OF RIGHT SIDE OF L4-L5 INTERVERTEBRAL DISC: ICD-10-CM

## 2020-04-07 DIAGNOSIS — M51.369 DDD (DEGENERATIVE DISC DISEASE), LUMBAR: ICD-10-CM

## 2020-04-07 DIAGNOSIS — G89.29 CHRONIC RIGHT-SIDED LOW BACK PAIN WITH RIGHT-SIDED SCIATICA: ICD-10-CM

## 2020-04-07 DIAGNOSIS — M51.27 HERNIATED NUCLEUS PULPOSUS, L5-S1, RIGHT: ICD-10-CM

## 2020-04-07 PROCEDURE — G0463 HOSPITAL OUTPT CLINIC VISIT: HCPCS | Mod: TC

## 2020-04-07 NOTE — TELEPHONE ENCOUNTER
IR PHONE CONSULT WAS DONE DUE TO COVID-19.  INFORMATION AND IMAGES REVIEWED BY RADIOLOGIST AND FOUND UNDER IMAGING TAB UNDER CONSULT.

## 2020-04-08 ENCOUNTER — TELEPHONE (OUTPATIENT)
Dept: GENERAL RADIOLOGY | Facility: HOSPITAL | Age: 55
End: 2020-04-08

## 2020-04-08 NOTE — TELEPHONE ENCOUNTER
Patient was called with recommendation from IR Telephone consult.  Patient was informed of recommendation of DMITRIY lumbar spine and she does want to proceed with this.  She is aware we are not doing injections at this time due to COVID-19 and scheduling will call and schedule when we resume DMITRIY injections.

## 2020-04-27 ENCOUNTER — VIRTUAL VISIT (OUTPATIENT)
Dept: FAMILY MEDICINE | Facility: OTHER | Age: 55
End: 2020-04-27
Attending: FAMILY MEDICINE
Payer: OTHER MISCELLANEOUS

## 2020-04-27 DIAGNOSIS — M51.26 HERNIATION OF RIGHT SIDE OF L4-L5 INTERVERTEBRAL DISC: ICD-10-CM

## 2020-04-27 DIAGNOSIS — M54.41 CHRONIC RIGHT-SIDED LOW BACK PAIN WITH RIGHT-SIDED SCIATICA: ICD-10-CM

## 2020-04-27 DIAGNOSIS — G89.29 CHRONIC RIGHT-SIDED LOW BACK PAIN WITH RIGHT-SIDED SCIATICA: ICD-10-CM

## 2020-04-27 DIAGNOSIS — M51.27 HERNIATED NUCLEUS PULPOSUS, L5-S1, RIGHT: Primary | ICD-10-CM

## 2020-04-27 PROCEDURE — 99213 OFFICE O/P EST LOW 20 MIN: CPT | Mod: 95 | Performed by: FAMILY MEDICINE

## 2020-04-27 ASSESSMENT — PAIN SCALES - GENERAL: PAINLEVEL: SEVERE PAIN (6)

## 2020-04-27 NOTE — PROGRESS NOTES
"Melba Hill is a 55 year old female who is being evaluated via a billable telephone visit.      The patient has been notified of following:     \"This telephone visit will be conducted via a call between you and your physician/provider. We have found that certain health care needs can be provided without the need for a physical exam.  This service lets us provide the care you need with a short phone conversation.  If a prescription is necessary we can send it directly to your pharmacy.  If lab work is needed we can place an order for that and you can then stop by our lab to have the test done at a later time.    Telephone visits are billed at different rates depending on your insurance coverage. During this emergency period, for some insurers they may be billed the same as an in-person visit.  Please reach out to your insurance provider with any questions.    If during the course of the call the physician/provider feels a telephone visit is not appropriate, you will not be charged for this service.\"    Patient has given verbal consent for Telephone visit?  Yes    How would you like to obtain your AVS? MyChart    Subjective     Melba Hill is a 55 year old female who presents to clinic today for the following health issues:    HPI  Occupational Visit     SUBJECTIVE:  Melba Hill, 55 year old, female is seen for follow up of occupational injury. Date of injury is 1/31/20.    Linked Episodes   Type: Episode: Status: Noted: Resolved: Last update: Updated by:   WORK COMP [employer] [facility] Active 1/31/2019 4/27/2020  7:57 AM Marisa Castillo, MARYN      Comments:     Patient would like work ability mailed to her today     Back Pain       Duration: Follow up         Specific cause: walking, work-related    Description:   Location of pain: low back right  Character of pain: constant  Pain radiation:radiates into the right leg  New numbness or weakness in legs, not attributed to pain:  YES- patient " states she can't walk like she use to     Intensity: Currently 6/10    History:   Pain interferes with job: YES  History of back problems: no prior back problems  Any previous MRI or X-rays: Yes- at [facility].    Sees a specialist for back pain:  No  Therapies tried without relief: Physical Therapy    Alleviating factors:   Improved by: none      Precipitating factors:  Worsened by: Lifting, Walking and stairs      Doing HEP 1-2 times a day  Walking program also- up to a mile   Using meds - relafen and zanflex more as prn   Back and right sciatica about same - good days and bad  50/50 on back and leg pain - equal   NO weakness in leg- but large steps gets more pain in back and right leg both    Pt is on work restrictions // BUT work will not take ANY restrictions - so she is not working since injury       PROCEDURE: IR CONSULTATION FOR IR EXAM 4/7/2020 8:44 AM     HISTORY: Herniated nucleus pulposus, L5-S1, right; Herniation of right  side of L4-L5 intervertebral disc; DDD (degenerative disc disease),  lumbar; Chronic right-sided low back pain with right-sided sciatica;  Chronic right-sided low back pain with right-sided sciatica     A telephone consultation was obtained. The patient has a low back and  right leg pain currently rated a 5 out of 10. Recent MRI scan  demonstrated asymmetric disc protrusion at L5-S1 on the right  moderately compressing the right S1 nerve. In light of the patient's  back pain I would recommend a transforaminal epidural steroid  injection at L5-S1 on the right for lumbar spondylosis. This will be  scheduled as soon as insurance preauthorization has been obtained and  the hospital allows selective procedures.          RJ LARSEN MD        Allergies   Allergen Reactions     Soap      Dove and ivory and dial     Sulfa Drugs Hives     Sulfonamide antibiotics             Current Outpatient Medications   Medication Sig Dispense Refill     ACCU-CHEK FASTCLIX LANCETS MISC Please use fresh  needle to check blood glucose three times a day 100 each 12     ACCU-CHEK SMARTVIEW test strip USE TO TEST BLOOD SUGARS THREE TIMES DAILY 100 each 11     desoximetasone (TOPICORT LP) 0.05 % external cream APPLY TO OUTSIDE OF THE AFFECTED VULVAR AREAS EVERY NIGHT BEFORE SLEEP 60 g 3     lisinopril (PRINIVIL/ZESTRIL) 2.5 MG tablet Take 1 tablet (2.5 mg) by mouth daily 90 tablet 1     loratadine (CLARITIN) 10 MG tablet Take 10 mg by mouth daily as needed        Lutein 20 MG CAPS Take by mouth daily       MAGNESIUM OXIDE PO Take by mouth daily       metFORMIN (GLUCOPHAGE-XR) 500 MG 24 hr tablet TAKE 4 TABLETS (2,000 MG) BY MOUTH DAILY WITH DINNER. 360 tablet 1     nabumetone (RELAFEN) 750 MG tablet Take 1 tablet (750 mg) by mouth 2 times daily 30 tablet 1     NONFORMULARY Tumeric       NONFORMULARY Potassium 99 mg once daily       omega-3 acid ethyl esters (LOVAZA) 1 G capsule Take 2 g by mouth 2 times daily       omeprazole (PRILOSEC) 20 MG capsule Take by mouth daily       simvastatin (ZOCOR) 20 MG tablet TAKE 1 TABLET BY MOUTH NIGHTLY AT BEDTIME 90 tablet 1     sitagliptin (JANUVIA) 100 MG tablet TAKE 1 TABLET DAILY BY MOUTH 90 tablet 1     tiZANidine (ZANAFLEX) 4 MG tablet Take 1 tablet (4 mg) by mouth 3 times daily as needed for muscle spasms 30 tablet 2     triamcinolone (KENALOG) 0.025 % cream Apply topically 2 times daily Apply to sites of tenderness or redness once a day. 80 g 1     vitamin  B complex with vitamin C (VITAMIN  B COMPLEX) TABS Take 1 tablet by mouth daily       VITAMIN D, CHOLECALCIFEROL, PO Take 1,000 Units by mouth daily       Allergies   Allergen Reactions     Soap      Dove and ivory and dial     Sulfa Drugs Hives     Sulfonamide antibiotics         Reviewed and updated as needed this visit by Provider         Review of Systems   ROS COMP: CONSTITUTIONAL: NEGATIVE for fever, chills, change in weight  RESP: NEGATIVE for significant cough or SOB  CV: NEGATIVE for chest pain, palpitations or  peripheral edema       Objective   Reported vitals:  LMP  (LMP Unknown)    healthy, alert and no distress  PSYCH: Alert and oriented times 3; coherent speech, normal   rate and volume, able to articulate logical thoughts, able   to abstract reason, no tangential thoughts, no hallucinations   or delusions  Her affect is normal  RESP: No cough, no audible wheezing, able to talk in full sentences  Remainder of exam unable to be completed due to telephone visits            Assessment/Plan:  1. Herniated nucleus pulposus, L5-S1, right  2. Herniation of right side of L4-L5 intervertebral disc  3. Chronic right-sided low back pain with right-sided sciatica    No better but no worse overall. Has been approved for IR consult and that was done. Now ready for DMITRIY BUT still on Covid Restrictions.  Pt is walking and doing HEP. Still has good and bad days and feels can not go back to work unrestricted in which her work place wants. Pt feels HEP helps very little. I recommend her calling PT and see if there is other options for her. Will stay at same work restrictions. Wait for DMITRIY. Pt did not tolerate oral steroids in past and would not recommend daily meds at this time - has more PRN meds currently.  Discussed behavioral changes and proper body mechanics needed to help control patient's back pain.   F/u in 3-4 weeks       No follow-ups on file.      Phone call duration:  13 minutes    Alvin Connolly MD

## 2020-05-06 ENCOUNTER — TELEPHONE (OUTPATIENT)
Dept: FAMILY MEDICINE | Facility: OTHER | Age: 55
End: 2020-05-06
Payer: COMMERCIAL

## 2020-05-06 NOTE — TELEPHONE ENCOUNTER
Form received release for workability forms ,placed in provider's wall bin.   After form is completed patient would like form to be mailed to her and called to say it is on the way

## 2020-05-21 NOTE — TELEPHONE ENCOUNTER
Pt called, employer has not received a workability and they need this by Monday. Please advise. Thank you!

## 2020-05-21 NOTE — TELEPHONE ENCOUNTER
Pt calling on the below.She needs it by today or tomorrow. Workability form. She is on workmans comp.    She would like to pick this up today.    Please advise.    Call [phone]

## 2020-05-26 ENCOUNTER — TELEPHONE (OUTPATIENT)
Dept: FAMILY MEDICINE | Facility: OTHER | Age: 55
End: 2020-05-26

## 2020-05-26 DIAGNOSIS — R80.9 MICROALBUMINURIA DUE TO TYPE 2 DIABETES MELLITUS (H): ICD-10-CM

## 2020-05-26 DIAGNOSIS — E11.29 MICROALBUMINURIA DUE TO TYPE 2 DIABETES MELLITUS (H): ICD-10-CM

## 2020-05-26 DIAGNOSIS — E11.9 TYPE 2 DIABETES MELLITUS WITHOUT COMPLICATION, WITHOUT LONG-TERM CURRENT USE OF INSULIN (H): ICD-10-CM

## 2020-05-28 DIAGNOSIS — E11.9 TYPE 2 DIABETES MELLITUS WITHOUT COMPLICATION, WITHOUT LONG-TERM CURRENT USE OF INSULIN (H): ICD-10-CM

## 2020-05-28 RX ORDER — METFORMIN HCL 500 MG
TABLET, EXTENDED RELEASE 24 HR ORAL
Qty: 360 TABLET | Refills: 1 | Status: SHIPPED | OUTPATIENT
Start: 2020-05-28 | End: 2021-01-14

## 2020-05-28 RX ORDER — LISINOPRIL 2.5 MG/1
TABLET ORAL
Qty: 90 TABLET | Refills: 1 | Status: SHIPPED | OUTPATIENT
Start: 2020-05-28 | End: 2021-06-18

## 2020-05-29 NOTE — TELEPHONE ENCOUNTER
januvia      Last Written Prescription Date:  12/11/2019  Last Fill Quantity: 90,   # refills: 1  Last Office Visit: 4/27/2020

## 2020-06-09 ENCOUNTER — MYC MEDICAL ADVICE (OUTPATIENT)
Dept: FAMILY MEDICINE | Facility: OTHER | Age: 55
End: 2020-06-09

## 2020-06-11 ENCOUNTER — TELEPHONE (OUTPATIENT)
Dept: FAMILY MEDICINE | Facility: OTHER | Age: 55
End: 2020-06-11
Payer: COMMERCIAL

## 2020-06-11 DIAGNOSIS — M47.816 LUMBAR SPONDYLOSIS: Primary | ICD-10-CM

## 2020-06-11 NOTE — TELEPHONE ENCOUNTER
Received call from Avinash at the pain center at [facility]. States Dr. Connolly recommended injections. He needs the order faxed to them so that they can get the patient scheduled and get the injection. States originally was going to go through our system but would not be able to get in for a while so it was sent through to [facility]. Please fax orders to [phone]. Unable to find orders in chart to fax myself. Ashley LeChevalier LPN

## 2020-06-22 ENCOUNTER — HOSPITAL ENCOUNTER (OUTPATIENT)
Facility: HOSPITAL | Age: 55
End: 2020-06-22
Attending: RADIOLOGY | Admitting: RADIOLOGY
Payer: OTHER MISCELLANEOUS

## 2020-07-21 ENCOUNTER — TELEPHONE (OUTPATIENT)
Dept: FAMILY MEDICINE | Facility: OTHER | Age: 55
End: 2020-07-21

## 2020-07-21 NOTE — TELEPHONE ENCOUNTER
Form received Workers Comp paperwork,placed in provider's wall bin.   After form is completed patient would like form to be faxed in to # on paperwork. Patient would like original mailed back to her.  If there is any questions please call [phone].  Thank you

## 2020-10-05 ENCOUNTER — MYC MEDICAL ADVICE (OUTPATIENT)
Dept: FAMILY MEDICINE | Facility: OTHER | Age: 55
End: 2020-10-05

## 2020-10-05 DIAGNOSIS — E11.9 TYPE 2 DIABETES MELLITUS WITHOUT COMPLICATION, WITHOUT LONG-TERM CURRENT USE OF INSULIN (H): Primary | ICD-10-CM

## 2020-10-05 RX ORDER — GLIMEPIRIDE 1 MG/1
1 TABLET ORAL
Qty: 90 TABLET | Refills: 1 | Status: SHIPPED | OUTPATIENT
Start: 2020-10-05 | End: 2021-01-14

## 2020-10-07 ENCOUNTER — MYC MEDICAL ADVICE (OUTPATIENT)
Dept: FAMILY MEDICINE | Facility: OTHER | Age: 55
End: 2020-10-07

## 2020-11-13 ENCOUNTER — MYC MEDICAL ADVICE (OUTPATIENT)
Dept: FAMILY MEDICINE | Facility: OTHER | Age: 55
End: 2020-11-13

## 2020-11-18 ENCOUNTER — NURSE TRIAGE (OUTPATIENT)
Dept: FAMILY MEDICINE | Facility: OTHER | Age: 55
End: 2020-11-18

## 2020-11-18 DIAGNOSIS — Z20.822 COVID-19 RULED OUT: Primary | ICD-10-CM

## 2020-11-18 NOTE — TELEPHONE ENCOUNTER
"Call from pt reporting spouse was in contact with a coworker testing positive for covid 19. Spouse was tested on 11/15/20 and tested positive.     Covid testing:    Next 5 appointments (look out 90 days)    Nov 19, 2020  9:45 AM  (Arrive by 9:30 AM)  SHORT with HC COLLECTION [facility] [place] ([facility] [place] ) [address]  [place] [address]  [phone]            Reason for Disposition    [1] COVID-19 EXPOSURE (Close Contact) AND [2] within last 14 days BUT [3] NO symptoms    Answer Assessment - Initial Assessment Questions  1. CLOSE CONTACT: \"Who is the person with the confirmed or suspected COVID-19 infection that you were exposed to?\"      Spouse     2. PLACE of CONTACT: \"Where were you when you were exposed to COVID-19?\" (e.g., home, school, medical waiting room; which city?)      [place]     3. TYPE of CONTACT: \"How much contact was there?\" (e.g., sitting next to, live in same house, work in same office, same building)      Live in the same house    4. DURATION of CONTACT: \"How long were you in contact with the COVID-19 patient?\" (e.g., a few seconds, passed by person, a few minutes, live with the patient)      Live with the patient     5. DATE of CONTACT: \"When did you have contact with a COVID-19 patient?\" (e.g., how many days ago)      11/18/20    6. TRAVEL: \"Have you traveled out of the country recently?\" If so, \"When and where?\"      * Also ask about out-of-state travel, since the CDC has identified some high-risk cities for community spread in the [place].      * Note: Travel becomes less relevant if there is widespread community transmission where the patient lives.      No     7. COMMUNITY SPREAD: \"Are there lots of cases of COVID-19 (community spread) where you live?\" (See public health department website, if unsure)        Yes cases are increasing     8. SYMPTOMS: \"Do you have any symptoms?\" (e.g., fever, cough, breathing difficulty)      No     9. " "PREGNANCY OR POSTPARTUM: \"Is there any chance you are pregnant?\" \"When was your last menstrual period?\" \"Did you deliver in the last 2 weeks?\"      No     10. HIGH RISK: \"Do you have any heart or lung problems? Do you have a weak immune system?\" (e.g., CHF, COPD, asthma, HIV positive, chemotherapy, renal failure, diabetes mellitus, sickle cell anemia)        DM Type 2    Protocols used: CORONAVIRUS (COVID-19) EXPOSURE-A- 8.4.20      "

## 2020-11-19 ENCOUNTER — OFFICE VISIT (OUTPATIENT)
Dept: FAMILY MEDICINE | Facility: OTHER | Age: 55
End: 2020-11-19
Attending: FAMILY MEDICINE
Payer: OTHER GOVERNMENT

## 2020-11-19 DIAGNOSIS — Z20.822 COVID-19 RULED OUT: ICD-10-CM

## 2020-11-19 PROCEDURE — U0003 INFECTIOUS AGENT DETECTION BY NUCLEIC ACID (DNA OR RNA); SEVERE ACUTE RESPIRATORY SYNDROME CORONAVIRUS 2 (SARS-COV-2) (CORONAVIRUS DISEASE [COVID-19]), AMPLIFIED PROBE TECHNIQUE, MAKING USE OF HIGH THROUGHPUT TECHNOLOGIES AS DESCRIBED BY CMS-2020-01-R: HCPCS | Performed by: FAMILY MEDICINE

## 2020-11-20 LAB
SARS-COV-2 RNA SPEC QL NAA+PROBE: NOT DETECTED
SPECIMEN SOURCE: NORMAL

## 2020-12-01 ENCOUNTER — TRANSFERRED RECORDS (OUTPATIENT)
Dept: HEALTH INFORMATION MANAGEMENT | Facility: CLINIC | Age: 55
End: 2020-12-01

## 2020-12-01 LAB — RETINOPATHY: NORMAL

## 2020-12-03 ENCOUNTER — TRANSFERRED RECORDS (OUTPATIENT)
Dept: HEALTH INFORMATION MANAGEMENT | Facility: CLINIC | Age: 55
End: 2020-12-03

## 2020-12-03 LAB — RETINOPATHY: NEGATIVE

## 2020-12-20 ENCOUNTER — HEALTH MAINTENANCE LETTER (OUTPATIENT)
Age: 55
End: 2020-12-20

## 2021-01-14 DIAGNOSIS — E11.9 TYPE 2 DIABETES MELLITUS WITHOUT COMPLICATION, WITHOUT LONG-TERM CURRENT USE OF INSULIN (H): ICD-10-CM

## 2021-01-14 DIAGNOSIS — E78.2 MIXED HYPERLIPIDEMIA: ICD-10-CM

## 2021-01-14 RX ORDER — METFORMIN HCL 500 MG
TABLET, EXTENDED RELEASE 24 HR ORAL
Qty: 360 TABLET | Refills: 1 | Status: SHIPPED | OUTPATIENT
Start: 2021-01-14 | End: 2021-06-18

## 2021-01-14 RX ORDER — GLIMEPIRIDE 1 MG/1
TABLET ORAL
Qty: 90 TABLET | Refills: 1 | Status: SHIPPED | OUTPATIENT
Start: 2021-01-14 | End: 2021-06-18

## 2021-01-14 RX ORDER — SIMVASTATIN 20 MG
TABLET ORAL
Qty: 90 TABLET | Refills: 0 | Status: SHIPPED | OUTPATIENT
Start: 2021-01-14 | End: 2021-04-07

## 2021-01-14 NOTE — TELEPHONE ENCOUNTER
SHELIA      Last Written Prescription Date:  10-5-2020  Last Fill Quantity: 90,   # refills: 1  Last Office Visit: 4-  Future Office visit:       Routing refill request to provider for review/approval because:

## 2021-01-14 NOTE — TELEPHONE ENCOUNTER
METFORMIN      Last Written Prescription Date:  5-  Last Fill Quantity: 360,   # refills: 1  Last Office Visit: 4-  Future Office visit:       Routing refill request to provider for review/approval because:      ZOCOR      Last Written Prescription Date:  8-  Last Fill Quantity: 90,   # refills: 0  Last Office Visit: 4-  Future Office visit:       Routing refill request to provider for review/approval because:

## 2021-02-27 ENCOUNTER — APPOINTMENT (OUTPATIENT)
Dept: GENERAL RADIOLOGY | Facility: HOSPITAL | Age: 56
End: 2021-02-27
Attending: NURSE PRACTITIONER
Payer: COMMERCIAL

## 2021-02-27 ENCOUNTER — HOSPITAL ENCOUNTER (EMERGENCY)
Facility: HOSPITAL | Age: 56
Discharge: HOME | End: 2021-02-27
Attending: NURSE PRACTITIONER | Admitting: NURSE PRACTITIONER
Payer: COMMERCIAL

## 2021-02-27 VITALS
RESPIRATION RATE: 16 BRPM | DIASTOLIC BLOOD PRESSURE: 78 MMHG | OXYGEN SATURATION: 98 % | HEART RATE: 71 BPM | SYSTOLIC BLOOD PRESSURE: 134 MMHG | TEMPERATURE: 98 F

## 2021-02-27 DIAGNOSIS — M25.562 ACUTE PAIN OF LEFT KNEE: ICD-10-CM

## 2021-02-27 PROCEDURE — G0463 HOSPITAL OUTPT CLINIC VISIT: HCPCS

## 2021-02-27 PROCEDURE — 99213 OFFICE O/P EST LOW 20 MIN: CPT | Performed by: NURSE PRACTITIONER

## 2021-02-27 PROCEDURE — 73562 X-RAY EXAM OF KNEE 3: CPT | Mod: LT

## 2021-02-27 RX ORDER — GABAPENTIN 300 MG/1
300 CAPSULE ORAL
COMMUNITY
Start: 2021-01-28 | End: 2021-07-16

## 2021-02-27 ASSESSMENT — ENCOUNTER SYMPTOMS
ACTIVITY CHANGE: 1
NAUSEA: 0
CHILLS: 0
DIZZINESS: 0
VOMITING: 0
HEADACHES: 0
ARTHRALGIAS: 1
FEVER: 0
LIGHT-HEADEDNESS: 0
SHORTNESS OF BREATH: 0

## 2021-02-27 NOTE — ED TRIAGE NOTES
Presents with left knee pain. Left leg muscle has been numb for about a week.  Knee is painful lateral side is the worse

## 2021-02-27 NOTE — DISCHARGE INSTRUCTIONS
Keep affected extremity elevated as much as possible for next 24 - 48 hours. Ice to affected area 20 minutes every hour as needed for comfort. After 48 hours you can apply heat. Ibuprofen 600 to 800 mg (3 - 4 tabs of over the counter med) every six to eight hours as needed;not to exceed maximum amount of 3200 mg in 24 hours. Take with food. Tylenol 650 to 1000 mg every four to six hours as needed (not to exceed more than 4000 mg in a 24 hour period). May use interchangeably. Suggest medicating around the clock for the next 24-48 hours. Use ace wrap  until you cam walk without having discomfort.  Slowly start to wiggle your toes and move foot and lower leg as often as possible but not beyond the point of pain. Follow up with primary provider as needed  Orthopedic referral placed.

## 2021-02-27 NOTE — ED TRIAGE NOTES
Pt c/o left knee pain for 1 week. Denies any injury or fall. Numbness that radiates up the thigh and into the groin. Has tried ice and elevation. Rates her pain 7/10. At its worse gets over 10.

## 2021-02-27 NOTE — ED PROVIDER NOTES
History     Chief Complaint   Patient presents with     Knee Pain     HPI  Melba Hill is a 56 year old female who complains of left knee pain that has been ongoing for the last ten days. Pain worse at night.This is accompanied with swelling (much improved), numbness and tingling in the lateral, medial, and anterior thigh. She does have a history of chronic low back pain, for the past two years,  and is being evaluated per neurologist for possible surgery. She has a vertebrae pinching nerves. She has tried ice,heat, 800 mg ibuprofen last night, physical therapy, and gabapentin. She was able to sleep with gabapentin. Denies fevers, chills, nausea, vomiting, diarrhea, and shortness of breath.    Musculoskeletal problem/pain      Duration: ten days     Description  Location: left knee, anterior, medial, and lateral left thigh    Intensity:  10/10    Dull pain, numb over distal anterior thigh.    Accompanying signs and symptoms: numbness, tingling and whole knee was swollen, applied ice resolved at this time.     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 Ibuprofen 800 did help. Last dose last night. Gabapentin helps to sleep.      Allergies:  Allergies   Allergen Reactions     Soap      Dove and ivory and dial     Sulfa Drugs Hives     Sulfonamide antibiotics         Problem List:    Patient Active Problem List    Diagnosis Date Noted     Herniated nucleus pulposus, L5-S1, right 03/18/2020     Priority: Medium     Herniation of right side of L4-L5 intervertebral disc 03/18/2020     Priority: Medium     Chronic right-sided low back pain with right-sided sciatica 02/28/2020     Priority: Medium     Vulvar dystrophy--BX LICHEN  SCLEROSIS--TOPICORT--2019     Priority: Medium     Social anxiety disorder 10/18/2017     Priority: Medium     ACP (advance care planning) 2017     Priority: Medium     Advance Care Planning 2017: ACP Review of Chart / Resources Provided:  Reviewed chart for advance care plan.  Melba Hill has no plan or code status on file. Discussed available resources and provided with information. Confirmed code status reflects current choices pending further ACP discussions.  Confirmed/documented legally designated decision makers.  Added by Ronna Fitzgerald             Type 2 diabetes mellitus without complication, without long-term current use of insulin (H) 2016     Priority: Medium     DDD (degenerative disc disease), lumbar 2016     Priority: Medium     Mixed hyperlipidemia 2016     Priority: Medium     Tobacco abuse, in remission      Priority: Medium     JIMÉNEZ (nonalcoholic steatohepatitis) 2014     Priority: Medium     Cervical high risk HPV (human papillomavirus) test--,  HRhpv negative with normal pap 2011     Priority: Medium     Esophageal reflux 2003     Priority: Medium        Past Medical History:    Past Medical History:   Diagnosis Date     Abnormal glandular Papanicolaou smear of cervix 2003     Cervical high risk HPV (human papillomavirus) test 2011     Diabetes mellitus (H)      Dysplasia of cervix (uteri) 2003     Esophageal reflux 2003     Hiatal hernia 2011     Hyperlipidemia 2011     JIMÉNEZ (nonalcoholic steatohepatitis) 2014     Tobacco abuse 2011     Tobacco abuse, in remission        Past Surgical History:    Past Surgical History:   Procedure Laterality Date     COLONOSCOPY  2014    Repeat in /Procedure: COLONOSCOPY;  Surgeon: Beba Sepulveda MD;  Location: [facility]     D & C      Missed      ESOPHAGOGASTRODUODENOSCOPY       exercise stress-negative       IR CONSULTATION  FOR IR EXAM  2020     LEEP TX, CERVICAL  ??    Dr Irving     TUBAL LIGATION         Family History:    Family History   Problem Relation Age of Onset     Cancer Maternal Aunt         lung     Diabetes Mother      Heart Disease Mother         murmur     Other - See Comments Maternal Uncle         myocardial infarction     Diabetes Maternal Grandmother        Social History:  Marital Status:   [2]  Social History     Tobacco Use     Smoking status: Former Smoker     Packs/day: 0.50     Years: 30.00     Pack years: 15.00     Types: Cigarettes     Quit date: 2013     Years since quittin.6     Smokeless tobacco: Never Used     Tobacco comment: tried to quit, no passive exposure, longest tobacco free: 1 year   Substance Use Topics     Alcohol use: Yes     Comment: occasionally     Drug use: No        Medications:    ACCU-CHEK FASTCLIX LANCETS MISC  ACCU-CHEK SMARTVIEW test strip  desoximetasone (TOPICORT LP) 0.05 % external cream  gabapentin (NEURONTIN) 300 MG capsule  glimepiride (AMARYL) 1 MG tablet  lisinopril (ZESTRIL) 2.5 MG tablet  Lutein 20 MG CAPS  MAGNESIUM OXIDE PO  metFORMIN (GLUCOPHAGE-XR) 500 MG 24 hr tablet  NONFORMULARY  NONFORMULARY  omega-3 acid ethyl esters (LOVAZA) 1 G capsule  omeprazole (PRILOSEC) 20 MG capsule  simvastatin (ZOCOR) 20 MG tablet  tiZANidine (ZANAFLEX) 4 MG tablet  vitamin  B complex with vitamin C (VITAMIN  B COMPLEX) TABS  VITAMIN D, CHOLECALCIFEROL, PO  nabumetone (RELAFEN) 750 MG tablet  triamcinolone (KENALOG) 0.025 % cream          Review of Systems   Constitutional: Positive for activity change. Negative for chills and fever.   Respiratory: Negative for shortness of breath.    Gastrointestinal: Negative for nausea and vomiting.   Musculoskeletal: Positive for arthralgias (left knee and thigh pain).   Skin: Negative.    Neurological: Negative for dizziness, light-headedness and headaches.       Physical Exam   BP: 134/78  Pulse: 76  Temp: 98  F (36.7   C)  Resp: 16  SpO2: 98 %      Physical Exam  Vitals signs and nursing note reviewed.   Constitutional:       General: She is in acute distress.      Appearance: She is overweight.   Cardiovascular:      Rate and Rhythm: Normal rate.   Pulmonary:      Effort: Pulmonary effort is normal.   Musculoskeletal:         General: Swelling and tenderness present.      Left knee: She exhibits decreased range of motion and swelling. She exhibits no effusion, no ecchymosis, no erythema, no LCL laxity, normal patellar mobility and no MCL laxity. Tenderness found. Lateral joint line and LCL tenderness noted. No medial joint line, no MCL and no patellar tendon tenderness noted.        Legs:    Skin:     General: Skin is warm and dry.      Capillary Refill: Capillary refill takes less than 2 seconds.      Findings: No bruising or erythema.   Neurological:      Mental Status: She is alert and oriented to person, place, and time.   Psychiatric:         Behavior: Behavior normal.         ED Course        Procedures           Results for orders placed or performed during the hospital encounter of 02/27/21 (from the past 24 hour(s))   XR Knee Left 3 Views    Narrative    PROCEDURE:  XR KNEE LT 3 VW    HISTORY: pain and swelling for unknown etiology.    COMPARISON:  None.    TECHNIQUE:  3 views left knee.    FINDINGS:  No fracture or dislocation is identified. The joint spaces  are preserved. No foreign body is seen.       Impression    IMPRESSION: No acute fracture.      LITTLE MOSER MD       Medications - No data to display    Assessments & Plan (with Medical Decision Making)     I have reviewed the nursing notes.    I have reviewed the findings, diagnosis, plan and need for follow up with the patient.  (M25.679) Acute pain of left knee  Comment: 56 year old female who complains of left knee pain that has been ongoing for the last ten days. Pain worse at night.This is accompanied with swelling (much improved), numbness and  tingling in the lateral, medial, and anterior thigh. She does have a history of chronic low back pain, for the past two years,  and is being evaluated per neurologist for possible surgery. She has a vertebrae pinching nerves. She has tried ice,heat, 800 mg ibuprofen last night, physical therapy, and gabapentin. She was able to sleep with gabapentin. Denies fevers, chills, nausea, vomiting, diarrhea, and shortness of breath.    MDM: Majority of discomfort over region of pes anserine and lateral joint line. Increased pain with extension of knee. Mild swelling over generalized  knee joint. Popliteal pulse obtained with doppler    Plan: Education provided for RICE and knee pain unceratin cause.   Keep affected extremity elevated as much as possible for next 24 - 48 hours. Ice to affected area 20 minutes every hour as needed for comfort. After 48 hours you can apply heat. Ibuprofen 600 to 800 mg (3 - 4 tabs of over the counter med) every six to eight hours as needed;not to exceed maximum amount of 3200 mg in 24 hours. Take with food. Tylenol 650 to 1000 mg every four to six hours as needed (not to exceed more than 4000 mg in a 24 hour period). May use interchangeably. Suggest medicating around the clock for the next 24-48 hours. Use ace wrap  until you cam walk without having discomfort.  Slowly start to wiggle your toes and move foot and lower leg as often as possible but not beyond the point of pain. Follow up with primary provider as needed  Orthopedic referral placed.  These discharge instructions and medications were reviewed with her and understanding verbalized.    Discharge Medication List as of 2/27/2021  2:32 PM          Final diagnoses:   Acute pain of left knee       2/27/2021   [facility]       Emy Ortega, CNP  02/27/21 [id number]

## 2021-02-28 ENCOUNTER — HEALTH MAINTENANCE LETTER (OUTPATIENT)
Age: 56
End: 2021-02-28

## 2021-04-07 DIAGNOSIS — E78.2 MIXED HYPERLIPIDEMIA: ICD-10-CM

## 2021-04-07 RX ORDER — SIMVASTATIN 20 MG
TABLET ORAL
Qty: 90 TABLET | Refills: 0 | Status: SHIPPED | OUTPATIENT
Start: 2021-04-07 | End: 2021-06-18

## 2021-04-07 NOTE — TELEPHONE ENCOUNTER
Simvastatin (ZOCOR) 20mg      Last Written Prescription Date:  1/14/2021  Last Fill Quantity: 90,   # refills: 0  Last Office Visit: 4/27/2020  Future Office visit:       Routing refill request to provider for review/approval because:

## 2021-04-18 ENCOUNTER — HEALTH MAINTENANCE LETTER (OUTPATIENT)
Age: 56
End: 2021-04-18

## 2021-06-14 NOTE — PROGRESS NOTES
SUBJECTIVE:   CC: Melba Hill is an 56 year old woman who presents for preventive health visit.       Patient has been advised of split billing requirements and indicates understanding: Yes  Healthy Habits:    Do you get at least three servings of calcium containing foods daily (dairy, green leafy vegetables, etc.)? yes    Amount of exercise or daily activities, outside of work: daily day(s) per week    Problems taking medications regularly No    Medication side effects: No    Have you had an eye exam in the past two years? yes    Do you see a dentist twice per year? yes    Do you have sleep apnea, excessive snoring or daytime drowsiness?no      Diabetes Follow-up      How often are you checking your blood sugar? Not at all    What concerns do you have today about your diabetes? None     Do you have any of these symptoms? (Select all that apply)  Numbness in feet    Have you had a diabetic eye exam in the last 12 months? Yes- Date of last eye exam: December,  Location: [facility] insurance and with Covid - way over due  Not checking BS       BP Readings from Last 2 Encounters:   02/27/21 134/78   02/28/20 134/74     Hemoglobin A1C (%)   Date Value   12/11/2019 8.0 (H)   09/04/2019 8.1 (H)     LDL Cholesterol Calculated (mg/dL)   Date Value   12/11/2019 125 (H)   08/22/2018 66         Hypertension Follow-up      Do you check your blood pressure regularly outside of the clinic? No     Are you following a low salt diet? No    Are your blood pressures ever more than 140 on the top number (systolic) OR more   than 90 on the bottom number (diastolic), for example 140/90? No      Today's PHQ-2 Score:   PHQ-2 ( 1999 Pfizer) 4/27/2020 4/24/2019   Q1: Little interest or pleasure in doing things 0 0   Q2: Feeling down, depressed or hopeless 0 0   PHQ-2 Score 0 0   Q1: Little interest or pleasure in doing things - -   Q2: Feeling down, depressed or hopeless - -   PHQ-2 Score - -       Abuse: Current or Past(Physical,  Sexual or Emotional)- No  Do you feel safe in your environment? Yes        Social History     Tobacco Use     Smoking status: Former Smoker     Packs/day: 0.50     Years: 30.00     Pack years: 15.00     Types: Cigarettes     Quit date: 2013     Years since quittin.9     Smokeless tobacco: Never Used     Tobacco comment: tried to quit, no passive exposure, longest tobacco free: 1 year   Substance Use Topics     Alcohol use: Yes     Comment: occasionally     If you drink alcohol do you typically have >3 drinks per day or >7 drinks per week? No                     Reviewed orders with patient.  Reviewed health maintenance and updated orders accordingly - Yes  Labs reviewed in EPIC    FSH-7: No flowsheet data found.    Mammogram Screening: Recommended annual mammography and Mammogram Screening - Alternate mammogram schedule due to breast cancer history  Pertinent mammograms are reviewed under the imaging tab.    Pertinent mammograms are reviewed under the imaging tab.  History of abnormal Pap smear: NO - age 30-65 PAP every 5 years with negative HPV co-testing recommended  PAP / HPV Latest Ref Rng & Units 10/18/2017 2015 2014   PAP - NIL NIL NIL   HPV 16 DNA NEG:Negative Negative - -   HPV 18 DNA NEG:Negative Negative - -   OTHER HR HPV NEG:Negative Negative - -     Reviewed and updated as needed this visit by clinical staff                 Reviewed and updated as needed this visit by Provider                Past Medical History:   Diagnosis Date     Abnormal glandular Papanicolaou smear of cervix 2003     Cervical high risk HPV (human papillomavirus) test 2011     Diabetes mellitus (H)      Dysplasia of cervix (uteri) 2003     Esophageal reflux 2003     Hiatal hernia 2011     Hyperlipidemia 2011     JIMÉNEZ (nonalcoholic steatohepatitis) 2014     Tobacco abuse 2011    Quit 2013     Tobacco abuse, in remission       Past Surgical History:   Procedure  "Laterality Date     COLONOSCOPY  2014    Repeat in /Procedure: COLONOSCOPY;  Surgeon: Beba Sepulveda MD;  Location: [facility]     D & C      Missed      ESOPHAGOGASTRODUODENOSCOPY       exercise stress-negative       IR CONSULTATION FOR IR EXAM  2020     LEEP TX, CERVICAL  ??    Dr Irving     TUBAL LIGATION         ROS:  CONSTITUTIONAL: NEGATIVE for fever, chills, change in weight  INTEGUMENTARY/SKIN: NEGATIVE for worrisome rashes, moles or lesions  EYES: NEGATIVE for vision changes or irritation  ENT: NEGATIVE for ear, mouth and throat problems  RESP: NEGATIVE for significant cough or SOB  BREAST: NEGATIVE for masses, tenderness or discharge  CV: NEGATIVE for chest pain, palpitations or peripheral edema  GI: NEGATIVE for nausea, abdominal pain, heartburn, or change in bowel habits  : NEGATIVE for unusual urinary or vaginal symptoms. No vaginal bleeding.  MUSCULOSKELETAL: NEGATIVE for significant arthralgias or myalgia  NEURO: NEGATIVE for weakness, dizziness or paresthesias  PSYCHIATRIC: NEGATIVE for changes in mood or affect     OBJECTIVE:   /70   Pulse 90   Temp 97.9  F (36.6  C)   Resp 18   Ht 1.695 m (5' 6.75\")   Wt 88.5 kg (195 lb)   LMP  (LMP Unknown)   SpO2 98%   BMI 30.77 kg/m    EXAM:  GENERAL: healthy, alert and no distress  EYES: Eyes grossly normal to inspection, PERRL and conjunctivae and sclerae normal  HENT: ear canals and TM's normal, nose and mouth without ulcers or lesions  NECK: no adenopathy, no asymmetry, masses, or scars and thyroid normal to palpation  RESP: lungs clear to auscultation - no rales, rhonchi or wheezes  BREAST: normal without masses, tenderness or nipple discharge and no palpable axillary masses or adenopathy  CV: regular rate and rhythm, normal S1 S2, no S3 or S4, no murmur, click or rub, no peripheral edema and peripheral pulses strong  ABDOMEN: soft, nontender, no hepatosplenomegaly, no masses and bowel sounds normal   " (female):deferred by pt   MS: no gross musculoskeletal defects noted, no edema  SKIN: no suspicious lesions or rashes  NEURO: Normal strength and tone, mentation intact and speech normal  PSYCH: mentation appears normal, affect normal/bright  LYMPH: no cervical, supraclavicular, axillary, or inguinal adenopathy    Results for orders placed or performed in visit on 06/18/21   CBC with platelets differential     Status: Abnormal   Result Value Ref Range    WBC 11.1 (H) 4.0 - 11.0 10e9/L    RBC Count 4.82 3.8 - 5.2 10e12/L    Hemoglobin 14.3 11.7 - 15.7 g/dL    Hematocrit 43.2 35.0 - 47.0 %    MCV 90 78 - 100 fl    MCH 29.7 26.5 - 33.0 pg    MCHC 33.1 31.5 - 36.5 g/dL    RDW 13.0 10.0 - 15.0 %    Platelet Count 215 150 - 450 10e9/L    Diff Method Automated Method     % Neutrophils 60.4 %    % Lymphocytes 29.8 %    % Monocytes 6.5 %    % Eosinophils 2.9 %    % Basophils 0.1 %    % Immature Granulocytes 0.3 %    Nucleated RBCs 0 0 /100    Absolute Neutrophil 6.7 1.6 - 8.3 10e9/L    Absolute Lymphocytes 3.3 0.8 - 5.3 10e9/L    Absolute Monocytes 0.7 0.0 - 1.3 10e9/L    Absolute Eosinophils 0.3 0.0 - 0.7 10e9/L    Absolute Basophils 0.0 0.0 - 0.2 10e9/L    Abs Immature Granulocytes 0.0 0 - 0.4 10e9/L    Absolute Nucleated RBC 0.0    Comprehensive metabolic panel     Status: Abnormal   Result Value Ref Range    Sodium 137 133 - 144 mmol/L    Potassium 3.7 3.4 - 5.3 mmol/L    Chloride 106 94 - 109 mmol/L    Carbon Dioxide 25 20 - 32 mmol/L    Anion Gap 6 3 - 14 mmol/L    Glucose 268 (H) 70 - 99 mg/dL    Urea Nitrogen 13 7 - 30 mg/dL    Creatinine 0.76 0.52 - 1.04 mg/dL    GFR Estimate 86 >60 mL/min/[1.73_m2]    GFR Estimate If Black >90 >60 mL/min/[1.73_m2]    Calcium 9.0 8.5 - 10.1 mg/dL    Bilirubin Total 0.6 0.2 - 1.3 mg/dL    Albumin 3.5 3.4 - 5.0 g/dL    Protein Total 7.5 6.8 - 8.8 g/dL    Alkaline Phosphatase 136 40 - 150 U/L    ALT 44 0 - 50 U/L    AST 25 0 - 45 U/L   Albumin Random Urine Quantitative with Creat Ratio      Status: Abnormal   Result Value Ref Range    Creatinine Urine 100 mg/dL    Albumin Urine mg/L 35 mg/L    Albumin Urine mg/g Cr 35.51 (H) 0 - 25 mg/g Cr   TSH     Status: None   Result Value Ref Range    TSH 1.62 0.40 - 4.00 mU/L   Lipid Profile     Status: Abnormal   Result Value Ref Range    Cholesterol 112 <200 mg/dL    Triglycerides 137 <150 mg/dL    HDL Cholesterol 29 (L) >49 mg/dL    LDL Cholesterol Calculated 56 <100 mg/dL    Non HDL Cholesterol 83 <130 mg/dL   Hemoglobin A1c     Status: Abnormal   Result Value Ref Range    Hemoglobin A1C 10.5 (H) 0 - 5.6 %         ASSESSMENT/PLAN:   1. Mixed hyperlipidemia  Stable on statin   - Lipid Profile; Future  - Comprehensive metabolic panel; Future  - Comprehensive metabolic panel  - Lipid Profile  - Estimated Average Glucose    2. Type 2 diabetes mellitus without complication, without long-term current use of insulin (H)  Worse control and overdue on follow up. Discussed. Will double amaryl to 2 mg /same dose of Glucophage. Better diet and increase activity if can - has bad back. Follow-up with BS in 4 weeks need to check BS BID   - Hemoglobin A1c; Future  - Lipid Profile; Future  - TSH; Future  - Albumin Random Urine Quantitative with Creat Ratio; Future  - Comprehensive metabolic panel; Future  - CBC with platelets differential; Future  - CBC with platelets differential  - Comprehensive metabolic panel  - Albumin Random Urine Quantitative with Creat Ratio  - TSH  - Lipid Profile  - Hemoglobin A1c    3. Microalbuminuria due to type 2 diabetes mellitus (H)  On ACE  - Albumin Random Urine Quantitative with Creat Ratio; Future  - Comprehensive metabolic panel; Future  - Comprehensive metabolic panel  - Albumin Random Urine Quantitative with Creat Ratio    4. JIMÉNEZ (nonalcoholic steatohepatitis)  Better   - Comprehensive metabolic panel; Future  - Comprehensive metabolic panel    5. Encounter for screening mammogram for breast cancer  Mammogram ordered     6. Rash  "and nonspecific skin eruption  Has Lichen Sclerosis - all over. Benadryl helping. Add little stronger steroid ointment prn  - triamcinolone (KENALOG) 0.1 % external ointment; Apply topically 2 times daily For skin rash  Dispense: 80 g; Refill: 1    Patient has been advised of split billing requirements and indicates understanding:   COUNSELING:   Reviewed preventive health counseling, as reflected in patient instructions       Regular exercise       Healthy diet/nutrition    Estimated body mass index is 30.85 kg/m  as calculated from the following:    Height as of 2/28/20: 1.702 m (5' 7\").    Weight as of 3/18/20: 89.4 kg (197 lb).    Weight management plan: Discussed healthy diet and exercise guidelines    She reports that she quit smoking about 7 years ago. Her smoking use included cigarettes. She has a 15.00 pack-year smoking history. She has never used smokeless tobacco.      Counseling Resources:  ATP IV Guidelines  Pooled Cohorts Equation Calculator  Breast Cancer Risk Calculator  BRCA-Related Cancer Risk Assessment: FHS-7 Tool  FRAX Risk Assessment  ICSI Preventive Guidelines  Dietary Guidelines for Americans, 2010  USDA's MyPlate  ASA Prophylaxis  Lung CA Screening    Alvin Connolly MD  [facility] - [place]  "

## 2021-06-18 ENCOUNTER — OFFICE VISIT (OUTPATIENT)
Dept: FAMILY MEDICINE | Facility: OTHER | Age: 56
End: 2021-06-18
Attending: FAMILY MEDICINE
Payer: COMMERCIAL

## 2021-06-18 VITALS
SYSTOLIC BLOOD PRESSURE: 122 MMHG | OXYGEN SATURATION: 98 % | HEIGHT: 67 IN | BODY MASS INDEX: 30.61 KG/M2 | RESPIRATION RATE: 18 BRPM | HEART RATE: 90 BPM | DIASTOLIC BLOOD PRESSURE: 70 MMHG | TEMPERATURE: 97.9 F | WEIGHT: 195 LBS

## 2021-06-18 DIAGNOSIS — Z12.31 VISIT FOR SCREENING MAMMOGRAM: Primary | ICD-10-CM

## 2021-06-18 DIAGNOSIS — R21 RASH AND NONSPECIFIC SKIN ERUPTION: ICD-10-CM

## 2021-06-18 DIAGNOSIS — K75.81 NASH (NONALCOHOLIC STEATOHEPATITIS): ICD-10-CM

## 2021-06-18 DIAGNOSIS — E78.2 MIXED HYPERLIPIDEMIA: ICD-10-CM

## 2021-06-18 DIAGNOSIS — Z12.31 ENCOUNTER FOR SCREENING MAMMOGRAM FOR BREAST CANCER: ICD-10-CM

## 2021-06-18 DIAGNOSIS — E11.29 MICROALBUMINURIA DUE TO TYPE 2 DIABETES MELLITUS (H): ICD-10-CM

## 2021-06-18 DIAGNOSIS — K21.9 GASTROESOPHAGEAL REFLUX DISEASE WITHOUT ESOPHAGITIS: ICD-10-CM

## 2021-06-18 DIAGNOSIS — Z12.31 ENCOUNTER FOR SCREENING MAMMOGRAM FOR BREAST CANCER: Primary | ICD-10-CM

## 2021-06-18 DIAGNOSIS — E11.9 TYPE 2 DIABETES MELLITUS WITHOUT COMPLICATION, WITHOUT LONG-TERM CURRENT USE OF INSULIN (H): ICD-10-CM

## 2021-06-18 DIAGNOSIS — E78.2 MIXED HYPERLIPIDEMIA: Primary | ICD-10-CM

## 2021-06-18 DIAGNOSIS — R80.9 MICROALBUMINURIA DUE TO TYPE 2 DIABETES MELLITUS (H): ICD-10-CM

## 2021-06-18 LAB
ALBUMIN SERPL-MCNC: 3.5 G/DL (ref 3.4–5)
ALP SERPL-CCNC: 136 U/L (ref 40–150)
ALT SERPL W P-5'-P-CCNC: 44 U/L (ref 0–50)
ANION GAP SERPL CALCULATED.3IONS-SCNC: 6 MMOL/L (ref 3–14)
AST SERPL W P-5'-P-CCNC: 25 U/L (ref 0–45)
BASOPHILS # BLD AUTO: 0 10E9/L (ref 0–0.2)
BASOPHILS NFR BLD AUTO: 0.1 %
BILIRUB SERPL-MCNC: 0.6 MG/DL (ref 0.2–1.3)
BUN SERPL-MCNC: 13 MG/DL (ref 7–30)
CALCIUM SERPL-MCNC: 9 MG/DL (ref 8.5–10.1)
CHLORIDE SERPL-SCNC: 106 MMOL/L (ref 94–109)
CHOLEST SERPL-MCNC: 112 MG/DL
CO2 SERPL-SCNC: 25 MMOL/L (ref 20–32)
CREAT SERPL-MCNC: 0.76 MG/DL (ref 0.52–1.04)
CREAT UR-MCNC: 100 MG/DL
DIFFERENTIAL METHOD BLD: ABNORMAL
EOSINOPHIL # BLD AUTO: 0.3 10E9/L (ref 0–0.7)
EOSINOPHIL NFR BLD AUTO: 2.9 %
ERYTHROCYTE [DISTWIDTH] IN BLOOD BY AUTOMATED COUNT: 13 % (ref 10–15)
EST. AVERAGE GLUCOSE BLD GHB EST-MCNC: 255 MG/DL
GFR SERPL CREATININE-BSD FRML MDRD: 86 ML/MIN/{1.73_M2}
GLUCOSE SERPL-MCNC: 268 MG/DL (ref 70–99)
HBA1C MFR BLD: 10.5 % (ref 0–5.6)
HCT VFR BLD AUTO: 43.2 % (ref 35–47)
HDLC SERPL-MCNC: 29 MG/DL
HGB BLD-MCNC: 14.3 G/DL (ref 11.7–15.7)
IMM GRANULOCYTES # BLD: 0 10E9/L (ref 0–0.4)
IMM GRANULOCYTES NFR BLD: 0.3 %
LDLC SERPL CALC-MCNC: 56 MG/DL
LYMPHOCYTES # BLD AUTO: 3.3 10E9/L (ref 0.8–5.3)
LYMPHOCYTES NFR BLD AUTO: 29.8 %
MCH RBC QN AUTO: 29.7 PG (ref 26.5–33)
MCHC RBC AUTO-ENTMCNC: 33.1 G/DL (ref 31.5–36.5)
MCV RBC AUTO: 90 FL (ref 78–100)
MICROALBUMIN UR-MCNC: 35 MG/L
MICROALBUMIN/CREAT UR: 35.51 MG/G CR (ref 0–25)
MONOCYTES # BLD AUTO: 0.7 10E9/L (ref 0–1.3)
MONOCYTES NFR BLD AUTO: 6.5 %
NEUTROPHILS # BLD AUTO: 6.7 10E9/L (ref 1.6–8.3)
NEUTROPHILS NFR BLD AUTO: 60.4 %
NONHDLC SERPL-MCNC: 83 MG/DL
NRBC # BLD AUTO: 0 10*3/UL
NRBC BLD AUTO-RTO: 0 /100
PLATELET # BLD AUTO: 215 10E9/L (ref 150–450)
POTASSIUM SERPL-SCNC: 3.7 MMOL/L (ref 3.4–5.3)
PROT SERPL-MCNC: 7.5 G/DL (ref 6.8–8.8)
RBC # BLD AUTO: 4.82 10E12/L (ref 3.8–5.2)
SODIUM SERPL-SCNC: 137 MMOL/L (ref 133–144)
TRIGL SERPL-MCNC: 137 MG/DL
TSH SERPL DL<=0.005 MIU/L-ACNC: 1.62 MU/L (ref 0.4–4)
WBC # BLD AUTO: 11.1 10E9/L (ref 4–11)

## 2021-06-18 PROCEDURE — 80061 LIPID PANEL: CPT | Performed by: FAMILY MEDICINE

## 2021-06-18 PROCEDURE — 83036 HEMOGLOBIN GLYCOSYLATED A1C: CPT | Performed by: FAMILY MEDICINE

## 2021-06-18 PROCEDURE — 80050 GENERAL HEALTH PANEL: CPT | Performed by: FAMILY MEDICINE

## 2021-06-18 PROCEDURE — 36415 COLL VENOUS BLD VENIPUNCTURE: CPT | Performed by: FAMILY MEDICINE

## 2021-06-18 PROCEDURE — 82043 UR ALBUMIN QUANTITATIVE: CPT | Performed by: FAMILY MEDICINE

## 2021-06-18 PROCEDURE — 99214 OFFICE O/P EST MOD 30 MIN: CPT | Performed by: FAMILY MEDICINE

## 2021-06-18 RX ORDER — LISINOPRIL 2.5 MG/1
2.5 TABLET ORAL DAILY
Qty: 90 TABLET | Refills: 3 | Status: SHIPPED | OUTPATIENT
Start: 2021-06-18 | End: 2022-07-19

## 2021-06-18 RX ORDER — TRIAMCINOLONE ACETONIDE 1 MG/G
OINTMENT TOPICAL 2 TIMES DAILY
Qty: 80 G | Refills: 1 | Status: SHIPPED | OUTPATIENT
Start: 2021-06-18

## 2021-06-18 RX ORDER — GLIMEPIRIDE 2 MG/1
2 TABLET ORAL
Qty: 90 TABLET | Refills: 1 | Status: SHIPPED | OUTPATIENT
Start: 2021-06-18 | End: 2021-09-20

## 2021-06-18 RX ORDER — METFORMIN HCL 500 MG
TABLET, EXTENDED RELEASE 24 HR ORAL
Qty: 360 TABLET | Refills: 1 | Status: SHIPPED | OUTPATIENT
Start: 2021-06-18 | End: 2022-01-03

## 2021-06-18 RX ORDER — MULTIPLE VITAMINS W/ MINERALS TAB 9MG-400MCG
1 TAB ORAL DAILY
COMMUNITY

## 2021-06-18 RX ORDER — SIMVASTATIN 20 MG
20 TABLET ORAL AT BEDTIME
Qty: 90 TABLET | Refills: 3 | Status: SHIPPED | OUTPATIENT
Start: 2021-06-18 | End: 2022-06-15

## 2021-06-18 ASSESSMENT — PAIN SCALES - GENERAL: PAINLEVEL: MODERATE PAIN (4)

## 2021-06-18 ASSESSMENT — ANXIETY QUESTIONNAIRES
3. WORRYING TOO MUCH ABOUT DIFFERENT THINGS: MORE THAN HALF THE DAYS
1. FEELING NERVOUS, ANXIOUS, OR ON EDGE: MORE THAN HALF THE DAYS
GAD7 TOTAL SCORE: 11
6. BECOMING EASILY ANNOYED OR IRRITABLE: SEVERAL DAYS
2. NOT BEING ABLE TO STOP OR CONTROL WORRYING: NEARLY EVERY DAY
7. FEELING AFRAID AS IF SOMETHING AWFUL MIGHT HAPPEN: MORE THAN HALF THE DAYS
5. BEING SO RESTLESS THAT IT IS HARD TO SIT STILL: NOT AT ALL
IF YOU CHECKED OFF ANY PROBLEMS ON THIS QUESTIONNAIRE, HOW DIFFICULT HAVE THESE PROBLEMS MADE IT FOR YOU TO DO YOUR WORK, TAKE CARE OF THINGS AT HOME, OR GET ALONG WITH OTHER PEOPLE: SOMEWHAT DIFFICULT
4. TROUBLE RELAXING: SEVERAL DAYS

## 2021-06-18 ASSESSMENT — MIFFLIN-ST. JEOR: SCORE: 1493.17

## 2021-06-18 ASSESSMENT — PATIENT HEALTH QUESTIONNAIRE - PHQ9: SUM OF ALL RESPONSES TO PHQ QUESTIONS 1-9: 6

## 2021-06-19 ASSESSMENT — ANXIETY QUESTIONNAIRES: GAD7 TOTAL SCORE: 11

## 2021-06-25 ENCOUNTER — MYC MEDICAL ADVICE (OUTPATIENT)
Dept: FAMILY MEDICINE | Facility: OTHER | Age: 56
End: 2021-06-25

## 2021-06-25 DIAGNOSIS — E11.9 TYPE 2 DIABETES MELLITUS WITHOUT COMPLICATION, WITHOUT LONG-TERM CURRENT USE OF INSULIN (H): Primary | ICD-10-CM

## 2021-06-28 ENCOUNTER — TRANSFERRED RECORDS (OUTPATIENT)
Dept: HEALTH INFORMATION MANAGEMENT | Facility: CLINIC | Age: 56
End: 2021-06-28

## 2021-07-06 ENCOUNTER — HOSPITAL ENCOUNTER (OUTPATIENT)
Dept: EDUCATION SERVICES | Facility: HOSPITAL | Age: 56
Discharge: HOME | End: 2021-07-06
Attending: FAMILY MEDICINE | Admitting: FAMILY MEDICINE
Payer: COMMERCIAL

## 2021-07-06 ENCOUNTER — TELEPHONE (OUTPATIENT)
Dept: EDUCATION SERVICES | Facility: HOSPITAL | Age: 56
End: 2021-07-06

## 2021-07-06 VITALS
SYSTOLIC BLOOD PRESSURE: 110 MMHG | DIASTOLIC BLOOD PRESSURE: 70 MMHG | HEART RATE: 79 BPM | OXYGEN SATURATION: 99 % | WEIGHT: 194.2 LBS | RESPIRATION RATE: 16 BRPM | HEIGHT: 67 IN | BODY MASS INDEX: 30.48 KG/M2

## 2021-07-06 DIAGNOSIS — E11.9 TYPE 2 DIABETES MELLITUS WITHOUT COMPLICATION, WITHOUT LONG-TERM CURRENT USE OF INSULIN (H): ICD-10-CM

## 2021-07-06 DIAGNOSIS — E11.65 TYPE 2 DIABETES MELLITUS WITH HYPERGLYCEMIA, WITHOUT LONG-TERM CURRENT USE OF INSULIN (H): Primary | ICD-10-CM

## 2021-07-06 PROCEDURE — G0108 DIAB MANAGE TRN  PER INDIV: HCPCS

## 2021-07-06 ASSESSMENT — MIFFLIN-ST. JEOR: SCORE: 1489.55

## 2021-07-06 ASSESSMENT — PAIN SCALES - GENERAL: PAINLEVEL: NO PAIN (0)

## 2021-07-06 NOTE — PATIENT INSTRUCTIONS
I will send Dr. Connolly the prescription for Rybelsus. When he signs it, we will do a PA. When back from your trip check with pharmacy for final cost with savings card. Let me know if this won't work.    Start Rybelsus 3 mg every am - take 30 minutes before any other meds/beverages/food. After 30 days, will increase to 7 mg daily.    Questions/concerns: send [employer] message or call [phone] (Sachi's phone)

## 2021-07-06 NOTE — PROGRESS NOTES
"Diabetes Self-Management Education & Support    Presents for: Individual review    SUBJECTIVE/OBJECTIVE:  Presents for: Individual review  Accompanied by: Self  Diabetes education in the past 24mo: No  Focus of Visit: Taking Medication  Diabetes type: Type 2  Date of diagnosis: 1/2/14  Disease course: Getting harder to manage  Diabetes management related comments/concerns: elevated A1C  Transportation concerns: No  Difficulty affording diabetes medication?: No  Difficulty affording diabetes testing supplies?: No  Other concerns:: None  Cultural Influences/Ethnic Background:  American      Diabetes Symptoms & Complications:  Fatigue: No  Neuropathy: No  Polydipsia: No  Polyphagia: No  Polyuria: No  Visual change: No  Slow healing wounds: No  Symptom course: Worsening       Patient Problem List and Family Medical History reviewed for relevant medical history, current medical status, and diabetes risk factors.    Vitals:  /70   Pulse 79   Resp 16   Ht 1.695 m (5' 6.75\")   Wt 88.1 kg (194 lb 3.2 oz)   LMP  (LMP Unknown)   SpO2 99%   BMI 30.64 kg/m    Estimated body mass index is 30.64 kg/m  as calculated from the following:    Height as of this encounter: 1.695 m (5' 6.75\").    Weight as of this encounter: 88.1 kg (194 lb 3.2 oz).   Last 3 BP:   BP Readings from Last 3 Encounters:   07/06/21 110/70   06/18/21 122/70   02/27/21 134/78       History   Smoking Status     Former Smoker     Packs/day: 0.50     Years: 30.00     Types: Cigarettes     Quit date: 7/4/2013   Smokeless Tobacco     Never Used     Comment: tried to quit, no passive exposure, longest tobacco free: 1 year       Labs:  Lab Results   Component Value Date    A1C 10.5 06/18/2021     Lab Results   Component Value Date     06/18/2021     Lab Results   Component Value Date    LDL 56 06/18/2021     HDL Cholesterol   Date Value Ref Range Status   06/18/2021 29 (L) >49 mg/dL Final   ]  GFR Estimate   Date Value Ref Range Status   06/18/2021 " 86 >60 mL/min/[1.73_m2] Final     Comment:     Non  GFR Calc  Starting 12/18/2018, serum creatinine based estimated GFR (eGFR) will be   calculated using the Chronic Kidney Disease Epidemiology Collaboration   (CKD-EPI) equation.       GFR Estimate If Black   Date Value Ref Range Status   06/18/2021 >90 >60 mL/min/[1.73_m2] Final     Comment:      GFR Calc  Starting 12/18/2018, serum creatinine based estimated GFR (eGFR) will be   calculated using the Chronic Kidney Disease Epidemiology Collaboration   (CKD-EPI) equation.       Lab Results   Component Value Date    CR 0.76 06/18/2021     No results found for: MICROALBUMIN    Healthy Eating:  Healthy Eating Assessed Today: Yes  Meals include: Breakfast, Lunch, Dinner  Breakfast: cereal and milk, toast and coffee with creamer  Lunch: sandwich or protein bar  Dinner: chicken and sweey potatoes  Snacks: cheese stick or cottage cheese with peaches (eats with mother who has diabetes)  Beverages: Water, Coffee  Has patient met with a dietitian in the past?: Yes    Being Active:  Being Active Assessed Today: Yes  Exercise:: Currently not exercising  Barrier to exercise: Physical limitation    Monitoring:  Monitoring Assessed Today: Yes  Did patient bring glucose meter to appointment? : Yes  Times checking blood sugar at home (number): 2  Times checking blood sugar at home (per): Day  Blood glucose trend: Decreasing        Taking Medications:  Diabetes Medication(s)     Biguanides       metFORMIN (GLUCOPHAGE-XR) 500 MG 24 hr tablet    TAKE 4 TABLETS (2000MG) BY MOUTH DAILY WITH DINNER.    Sulfonylureas       glimepiride (AMARYL) 2 MG tablet    Take 1 tablet (2 mg) by mouth every morning (before breakfast)    Incretin Mimetic Agents (GLP-1 Receptor Agonists)       Semaglutide (RYBELSUS) 3 MG TABS    Take 3 mg by mouth daily          Taking Medication Assessed Today: Yes  Current Treatments: Oral Medication (taken by mouth)(Metformin and  Glimepiride)  Problems taking diabetes medications regularly?: No  Diabetes medication side effects?: No    Problem Solving:  Problem Solving Assessed Today: Yes  Is the patient at risk for hypoglycemia?: Yes(Glimepiride)  Hypoglycemia Frequency: Never              Reducing Risks:  Reducing Risks Assessed Today: Yes  Has dilated eye exam at least once a year?: Yes  Feet checked by healthcare provider in the last year?: No(Due)    Healthy Coping:  Healthy Coping Assessed Today: Yes  Emotional response to diabetes: Ready to learn  Informal Support system:: Family  Stage of change: PREPARATION (Decided to change - considering how)  Support resources: Websites  Patient Activation Measure Survey Score:  JESSICA Score (Last Two) 8/22/2018   JESSICA Raw Score 38   Activation Score 83.7   JESSICA Level 4       Diabetes knowledge and skills assessment:   Patient is knowledgeable in diabetes management concepts related to: Healthy Eating, Monitoring and Taking Medication    Patient needs further education on the following diabetes management concepts: Monitoring and Taking Medication    Based on learning assessment above, most appropriate setting for further diabetes education would be: Individual setting.      INTERVENTIONS:    Education provided today on:  AADE Self-Care Behaviors:  Diabetes Pathophysiology  Monitoring: log and interpret results, individual blood glucose targets and frequency of monitoring  Taking Medication: action of prescribed medication, side effects of prescribed medications and when to take medications  Side effects were discussed, if patient has any abdominal pain, with or without nausea and/or vomiting, stop medication, call provider, clinic or go to the emergency room.    Opportunities for ongoing education and support in diabetes-self management were discussed.    Pt verbalized understanding of concepts discussed and recommendations provided today.       Education Materials Provided:  Medication Information  on Rybelsus      ASSESSMENT:  Melba has had diabetes for 8 years. A1C is elevated. Melba would like to discuss medication options and would prefer not to do injections.She also tells me that she is planning for a surgery in the future and will need to have A1C lower for that.    Glimepiride recently increased. There had been concern over Melba having itching after the med was increased. Melba states no longer itching. BGs trending downward slightly some post med change.    Discussed GLP1s and SGLTs. Melba would prefer not to take Jardiance due to side effects. Is open to Rybelsus. Cost is an issue.    Readings range as follows: fastin,160, 170, 180-212, post-breakfast 260, pre-lunch:221, pre-supper: 173, 181 and 222, 236,  post-supper: 152-229    Patient's most recent   Lab Results   Component Value Date    A1C 10.5 2021    is not meeting goal of <8.0    PLAN  See Patient Instructions for co-developed, patient-stated behavior change goals.  I will send Dr. Connolly the prescription for Rybelsus. When he signs it, we will do a PA. When back from your trip check with pharmacy for final cost with savings card. Let me know if this won't work.    Start Rybelsus 3 mg every am - take 30 minutes before any other meds/beverages/food. After 30 days, will increase to 7 mg daily.    Questions/concerns: send [employer] message or call [phone] (Scahi's phone)  AVS printed and provided to patient today. See Follow-Up section for recommended follow-up.        Time Spent: 40 minutes  Encounter Type: Individual    Any diabetes medication dose changes were made via the CDE Protocol and Collaborative Practice Agreement with the patient's primary care provider. A copy of this encounter was shared with the provider.

## 2021-07-06 NOTE — TELEPHONE ENCOUNTER
Met with Melba today. Discussed medication options. She doesn't want an injectable. Feels that she has some time to get BG down for surgery.    Reports itching has gone away with taking Glimepiride and seeing some decrease in BG readings    Melba has [employer]. Has $70 out of pocket cost for GLP1s and SGLT2s. Does not want to take a SGLT2. Open to Rybelsus. Needs PA for step therapy. If she can use the Rybelsus savings card, may get co-pay down to $10. We can see.    If you agree, I have pended a prescription for Rybelsus in this encouter for your signature.    Thanks!

## 2021-07-07 RX ORDER — ORAL SEMAGLUTIDE 3 MG/1
3 TABLET ORAL DAILY
Qty: 30 TABLET | Refills: 0 | Status: SHIPPED | OUTPATIENT
Start: 2021-07-07 | End: 2021-09-20 | Stop reason: DRUGHIGH

## 2021-07-27 ENCOUNTER — TELEPHONE (OUTPATIENT)
Dept: MAMMOGRAPHY | Facility: OTHER | Age: 56
End: 2021-07-27

## 2021-07-27 ENCOUNTER — ANCILLARY PROCEDURE (OUTPATIENT)
Dept: MAMMOGRAPHY | Facility: OTHER | Age: 56
End: 2021-07-27
Attending: FAMILY MEDICINE
Payer: COMMERCIAL

## 2021-07-27 DIAGNOSIS — Z12.31 VISIT FOR SCREENING MAMMOGRAM: ICD-10-CM

## 2021-07-27 PROCEDURE — 77067 SCR MAMMO BI INCL CAD: CPT | Mod: TC | Performed by: RADIOLOGY

## 2021-08-02 ENCOUNTER — MYC MEDICAL ADVICE (OUTPATIENT)
Dept: EDUCATION SERVICES | Facility: HOSPITAL | Age: 56
End: 2021-08-02

## 2021-08-02 DIAGNOSIS — E11.65 TYPE 2 DIABETES MELLITUS WITH HYPERGLYCEMIA, WITHOUT LONG-TERM CURRENT USE OF INSULIN (H): Primary | ICD-10-CM

## 2021-08-02 RX ORDER — ORAL SEMAGLUTIDE 7 MG/1
7 TABLET ORAL DAILY
Qty: 30 TABLET | Refills: 3 | Status: SHIPPED | OUTPATIENT
Start: 2021-08-02 | End: 2021-09-20

## 2021-09-13 NOTE — PROGRESS NOTES
Assessment & Plan     Type 2 diabetes mellitus without complication, without long-term current use of insulin (H)  Type 2 diabetes mellitus with hyperglycemia, without long-term current use of insulin (H)  A1c is pending, but home blood sugars have been great ranging from 107-160 consistently. Patient denies any hypoglycemic events. She will continue current medication regimen of metformin 2000 mg daily and rybelsus 7 mg daily. Will return for re-check in 4 months.   - Hemoglobin A1c; Future  - Semaglutide (RYBELSUS) 7 MG TABS; Take 7 mg by mouth daily     immunization counseling - Encouraged covid vaccination. Patient declines at this time. Encouraged influenza vaccine in October which patient plans of receiving.                 No follow-ups on file.    Alvin Connolly MD  [facility] - IKE Reilly is a 56 year old who presents for the following health issues     HPI     Diabetes Follow-up    How often are you checking your blood sugar? Two times daily 107-160  Blood sugar testing frequency justification:  Adjustment of medication(s)  What time of day are you checking your blood sugars (select all that apply)?  Before and after meals  Have you had any blood sugars above 200?  No  Have you had any blood sugars below 70?  No    What symptoms do you notice when your blood sugar is low?  None    What concerns do you have today about your diabetes? None     Do you have any of these symptoms? (Select all that apply)  Numbness in feet- takes gabapentin and seems to help some with neuropathic pain.    Patient feels that her current medication regimen has been great. She reports weight loss and consistently good BG readings. Denies s/s of hypoglycemia. No new neuropathy symptoms. She had an eye exam 12/2020.  1. foot deformity   No  2. Current or previous foot ulceration     Yes medial large toes   3. Current or previous pre-ulcerative calluses     No  4. previous partial amputation of  "one or both feet or complete amputation of one foot     No  5. peripheral neuropathy with evidence of callus formation     No  6. poor circulation     No        BP Readings from Last 2 Encounters:   07/06/21 110/70   06/18/21 122/70     Hemoglobin A1C (%)   Date Value   06/18/2021 10.5 (H)   12/11/2019 8.0 (H)     LDL Cholesterol Calculated (mg/dL)   Date Value   06/18/2021 56   12/11/2019 125 (H)         Review of Systems   Constitutional, HEENT, cardiovascular, pulmonary, gi and gu systems are negative, except as otherwise noted.      Objective    /68   Pulse 71   Temp 97.6  F (36.4  C)   Ht 1.695 m (5' 6.75\")   Wt 84.4 kg (186 lb)   LMP  (LMP Unknown)   SpO2 97%   BMI 29.35 kg/m    Body mass index is 29.35 kg/m .  Physical Exam   GENERAL: healthy, alert and no distress  NECK: no adenopathy, no asymmetry, masses, or scars and thyroid normal to palpation  RESP: lungs clear to auscultation - no rales, rhonchi or wheezes  CV: regular rate and rhythm, normal S1 S2, no S3 or S4, no murmur, click or rub, no peripheral edema and peripheral pulses strong  ABDOMEN: soft, nontender, no hepatosplenomegaly, no masses and bowel sounds normal  MS: no gross musculoskeletal defects noted, no edema  Diabetic foot exam: normal DP and PT pulses, no trophic changes or ulcerative lesions and normal sensory exam. Very minor callus' bilateral medial great toes.               "

## 2021-09-20 ENCOUNTER — LAB (OUTPATIENT)
Dept: LAB | Facility: OTHER | Age: 56
End: 2021-09-20
Attending: FAMILY MEDICINE
Payer: COMMERCIAL

## 2021-09-20 ENCOUNTER — OFFICE VISIT (OUTPATIENT)
Dept: FAMILY MEDICINE | Facility: OTHER | Age: 56
End: 2021-09-20
Attending: FAMILY MEDICINE
Payer: COMMERCIAL

## 2021-09-20 VITALS
HEART RATE: 71 BPM | WEIGHT: 186 LBS | HEIGHT: 67 IN | SYSTOLIC BLOOD PRESSURE: 130 MMHG | OXYGEN SATURATION: 97 % | BODY MASS INDEX: 29.19 KG/M2 | DIASTOLIC BLOOD PRESSURE: 68 MMHG | TEMPERATURE: 97.6 F

## 2021-09-20 DIAGNOSIS — E11.9 TYPE 2 DIABETES MELLITUS WITHOUT COMPLICATION, WITHOUT LONG-TERM CURRENT USE OF INSULIN (H): Primary | ICD-10-CM

## 2021-09-20 DIAGNOSIS — Z71.85 IMMUNIZATION COUNSELING: ICD-10-CM

## 2021-09-20 DIAGNOSIS — E11.9 TYPE 2 DIABETES MELLITUS WITHOUT COMPLICATION, WITHOUT LONG-TERM CURRENT USE OF INSULIN (H): ICD-10-CM

## 2021-09-20 LAB
EST. AVERAGE GLUCOSE BLD GHB EST-MCNC: 157 MG/DL
HBA1C MFR BLD: 7.1 % (ref 0–5.6)

## 2021-09-20 PROCEDURE — 36415 COLL VENOUS BLD VENIPUNCTURE: CPT

## 2021-09-20 PROCEDURE — 99213 OFFICE O/P EST LOW 20 MIN: CPT | Performed by: FAMILY MEDICINE

## 2021-09-20 PROCEDURE — 83036 HEMOGLOBIN GLYCOSYLATED A1C: CPT

## 2021-09-20 RX ORDER — ORAL SEMAGLUTIDE 7 MG/1
7 TABLET ORAL DAILY
Qty: 90 TABLET | Refills: 1 | Status: SHIPPED | OUTPATIENT
Start: 2021-09-20 | End: 2022-03-24

## 2021-09-20 ASSESSMENT — MIFFLIN-ST. JEOR: SCORE: 1452.35

## 2021-09-20 ASSESSMENT — PAIN SCALES - GENERAL: PAINLEVEL: EXTREME PAIN (8)

## 2021-09-20 NOTE — NURSING NOTE
"Chief Complaint   Patient presents with     Diabetes       Initial /68   Pulse 71   Temp 97.6  F (36.4  C)   Ht 1.695 m (5' 6.75\")   Wt 84.4 kg (186 lb)   LMP  (LMP Unknown)   SpO2 97%   BMI 29.35 kg/m   Estimated body mass index is 29.35 kg/m  as calculated from the following:    Height as of this encounter: 1.695 m (5' 6.75\").    Weight as of this encounter: 84.4 kg (186 lb).  Medication Reconciliation: complete  Vikas Leggett LPN  "

## 2021-10-03 ENCOUNTER — HEALTH MAINTENANCE LETTER (OUTPATIENT)
Age: 56
End: 2021-10-03

## 2021-10-29 ENCOUNTER — TELEPHONE (OUTPATIENT)
Dept: FAMILY MEDICINE | Facility: OTHER | Age: 56
End: 2021-10-29

## 2021-10-29 NOTE — TELEPHONE ENCOUNTER
Pt will call back to set up a covid for DOS 11/19/21 @ [facility] & Fax results to: [phone] w/Dr Juan Pugh

## 2021-11-03 NOTE — PATIENT INSTRUCTIONS
Preparing for Your Surgery  Getting started  A nurse will call you to review your health history and instructions. They will give you an arrival time based on your scheduled surgery time.  Please be ready to share the following:    Your doctor's clinic name and phone number    Your medical, surgical and anesthesia history    A list of allergies and sensitivities    A list of medicines, including herbal treatments and over-the-counter drugs    Whether the patient has a legal guardian (ask how to send us the papers in advance)  If you have a child who's having surgery, please ask for a copy of Preparing for Your Child's Surgery.    Preparing for surgery    Within 30 days of surgery: Have a pre-op exam (sometimes called an H&P, or History and Physical). This can be done at a clinic or pre-operative center.  ? If you're having a , you may not need this exam. Talk to your care team    At your pre-op exam, talk to your care team about all medicines you take. If you need to stop any medicines before surgery, ask when to start taking them again.  ? We do this for your safety. Many medicines can make you bleed too much during surgery. Some change how well surgery (anesthesia) drugs work.    Call your insurance company to let them know you're having surgery. (If you don't have insurance, call [phone].)    Call your clinic if there's any change in your health. This includes signs of a cold or flu (sore throat, runny nose, cough, rash, fever). It also includes a scrape or scratch near the surgery site.    If you have questions on the day of surgery, call your hospital or surgery center.  Eating and drinking guidelines  For your safety: Unless your surgeon tells you otherwise, follow the guidelines below.    Eat and drink as usual until 8 hours before surgery. After that, no food or milk.    Drink clear liquids until 2 hours before surgery. These are liquids you can see through, like water, Gatorade and Propel  Water. You may also have black coffee and tea (no cream or milk).    Nothing by mouth within 2 hours of surgery. This includes gum, candy and breath mints.    If you drink, stop drinking alcohol the night before surgery.    If your care team tells you to take medicine on the morning of surgery, it's okay to take it with a sip of water.  Preventing infection    Shower or bathe the night before and morning of your surgery. Follow the instructions your clinic gave you. (If no instructions, use regular soap.)    Don't shave or clip hair near your surgery site. We'll remove the hair if needed.    Don't smoke or vape the morning of surgery. You may chew nicotine gum up to 2 hours before surgery. A nicotine patch is okay.  ? Note: Some surgeries require you to completely quit smoking and nicotine. Check with your surgeon.    Your care team will make every effort to keep you safe from infection. We will:  ? Clean our hands often with soap and water (or an alcohol-based hand rub).  ? Clean the skin at your surgery site with a special soap that kills germs.  ? Give you a special gown to keep you warm. (Cold raises the risk of infection.)  ? Wear special hair covers, masks, gowns and gloves during surgery.  ? Give antibiotic medicine, if prescribed. Not all surgeries need antibiotics.  What to bring on the day of surgery    Photo ID and insurance card    Copy of your health care directive, if you have one    Glasses and hearing aides (bring cases)  ? You can't wear contacts during surgery    Inhaler and eye drops, if you use them (tell us about these when you arrive)    CPAP machine or breathing device, if you use them    A few personal items, if spending the night    If you have . . .  ? A pacemaker or ICD (cardiac defibrillator): Bring the ID card.  ? An implanted stimulator: Bring the remote control.  ? A legal guardian: Bring a copy of the certified (court-stamped) guardianship papers.  Please remove any jewelry, including  body piercings. Leave jewelry and other valuables at home.  If you're going home the day of surgery  Important: If you don't follow the rules below, we must cancel your surgery.     Arrange for someone to drive you home after surgery. You may not drive, take a taxi or take public transportation by yourself (unless you'll have local anesthesia only).    Arrange for a responsible adult to stay with you overnight. If you don't, we may keep you in the hospital overnight, and you may need to pay the costs yourself.  Questions?   If you have any questions for your care team, list them here: _________________________________________________________________________________________________________________________________________________________________________________________________________________________________________________________________________________________________________________________  For informational purposes only. Not to replace the advice of your health care provider. Copyright   2003, 2019 [employer]. All rights reserved. Clinically reviewed by Bee Easton MD. [employer] 448219 - REV 4/20.    Results for orders placed or performed in visit on 11/09/21   Basic metabolic panel     Status: None (Preliminary result)   Result Value Ref Range    Sodium 139 133 - 144 mmol/L    Potassium 4.0 3.4 - 5.3 mmol/L    Chloride 105 94 - 109 mmol/L    Carbon Dioxide (CO2)      Anion Gap      Urea Nitrogen      Creatinine      Calcium      Glucose      GFR Estimate     CBC with platelets and differential     Status: Abnormal   Result Value Ref Range    WBC Count 6.0 4.0 - 11.0 10e3/uL    RBC Count 4.54 3.80 - 5.20 10e6/uL    Hemoglobin 13.1 11.7 - 15.7 g/dL    Hematocrit 41.0 35.0 - 47.0 %    MCV 90 78 - 100 fL    MCH 28.9 26.5 - 33.0 pg    MCHC 32.0 31.5 - 36.5 g/dL    RDW 13.0 10.0 - 15.0 %    Platelet Count 151 150 - 450 10e3/uL    % Neutrophils 80 %    % Lymphocytes 10 %    % Monocytes 7 %    %  Eosinophils 2 %    % Basophils 1 %    % Immature Granulocytes 0 %    NRBCs per 100 WBC 0 <1 /100    Absolute Neutrophils 4.8 1.6 - 8.3 10e3/uL    Absolute Lymphocytes 0.6 (L) 0.8 - 5.3 10e3/uL    Absolute Monocytes 0.4 0.0 - 1.3 10e3/uL    Absolute Eosinophils 0.1 0.0 - 0.7 10e3/uL    Absolute Basophils 0.0 0.0 - 0.2 10e3/uL    Absolute Immature Granulocytes 0.0 <=0.0 10e3/uL    Absolute NRBCs 0.0 10e3/uL   CBC with Platelets & Differential     Status: Abnormal    Narrative    The following orders were created for panel order CBC with Platelets & Differential.  Procedure                               Abnormality         Status                     ---------                               -----------         ------                     CBC with platelets and d...[[id number]]  Abnormal            Final result                 Please view results for these tests on the individual orders.       Stop all supplements  And Relafen /Alleve now     NO Glucophage the night before    NO Rybelsius  And Zestril the morning of surgery

## 2021-11-03 NOTE — PROGRESS NOTES
[facility] [place]  [address] [facility] [address]  Phone: [phone]  Primary Provider: Khari Leong  Pre-op Performing Provider: KHARI LEONG      PREOPERATIVE EVALUATION:  Today's date: 11/9/2021    Melba Hill is a 56 year old female who presents for a preoperative evaluation.    Surgical Information:  Surgery/Procedure: clean around L5 area  Surgery Location: [facility]  Surgeon: Dr Pugh  Surgery Date: 11/19/21  Time of Surgery: unknown  Where patient plans to recover: At home with family  Fax number for surgical facility:     Type of Anesthesia Anticipated: to be determined    Assessment & Plan     The proposed surgical procedure is considered INTERMEDIATE risk.      ICD-10-CM    1. Preop general physical exam  Z01.818 CBC with Platelets & Differential     Basic metabolic panel     EKG 12-lead complete w/read - Clinics     Basic metabolic panel     CBC with Platelets & Differential   2. Herniation of right side of L4-L5 intervertebral disc  M51.26    3. DDD (degenerative disc disease), lumbar  M51.36    4. Chronic right-sided low back pain with right-sided sciatica  M54.41     G89.29    5. Type 2 diabetes mellitus without complication, without long-term current use of insulin (H)  E11.9    6. JIMÉNEZ (nonalcoholic steatohepatitis)  K75.81           Covid swab/testing  appt made     PT IS NOT COVID VACCINATED. STILL REFUSES AT THIS TIME         Medication Instructions:  Patient is to take all scheduled medications on the day of surgery   Stop all supplements  And Relafen /Alleve now     NO Glucophage the night before    NO Rybelsius  And Zestril the morning of surgery      RECOMMENDATION:  APPROVAL GIVEN to proceed with proposed procedure, without further diagnostic evaluation.                      Subjective     HPI related to upcoming procedure:   55 yO female  presents for cardiopulmonary/general clearance to undergo the above procedure.  His surgeon listed above has asked for this  clearance to undergo anesthesia. Pt has had this condition for approximately  12/31/2018 - work injury.   Overall pt is of good health and has not  had any perioperative complications with previous surgeries.          Preop Questions 11/9/2021   1. Have you ever had a heart attack or stroke? No   2. Have you ever had surgery on your heart or blood vessels, such as a stent placement, a coronary artery bypass, or surgery on an artery in your head, neck, heart, or legs? No   3. Do you have chest pain with activity? No   4. Do you have a history of  heart failure? No   5. Do you currently have a cold, bronchitis or symptoms of other infection? No   6. Do you have a cough, shortness of breath, or wheezing? No   7. Do you or anyone in your family have previous history of blood clots? no   8. Do you or does anyone in your family have a serious bleeding problem such as prolonged bleeding following surgeries or cuts? no   9. Have you ever had problems with anemia or been told to take iron pills? No   10. Have you had any abnormal blood loss such as black, tarry or bloody stools, or abnormal vaginal bleeding? No   11. Have you ever had a blood transfusion? UNKNOWN -    12. Are you willing to have a blood transfusion if it is medically needed before, during, or after your surgery? Yes   13. Have you or any of your relatives ever had problems with anesthesia? No   14. Do you have sleep apnea, excessive snoring or daytime drowsiness? No   15. Do you have any artifical heart valves or other implanted medical devices like a pacemaker, defibrillator, or continuous glucose monitor? No   16. Do you have artificial joints? No   17. Are you allergic to latex? No   18. Is there any chance that you may be pregnant? No     Health Care Directive:  Patient does not have a Health Care Directive or Living Will: Discussed advance care planning with patient; however, patient declined at this time.    Preoperative Review of :   reviewed -  no record of controlled substances prescribed.    Status of Chronic Conditions:  DIABETES - Patient has a longstanding history of DiabetesType Type II . Patient is being treated with diet and oral agents and denies significant side effects. Control has been good. Complicating factors include but are not limited to: hypertension and hyperlipidemia.       Review of Systems  Constitutional, neuro, ENT, endocrine, pulmonary, cardiac, gastrointestinal, genitourinary, musculoskeletal, integument and psychiatric systems are negative, except as otherwise noted.    Patient Active Problem List    Diagnosis Date Noted     Herniated nucleus pulposus, L5-S1, right 03/18/2020     Priority: Medium     Herniation of right side of L4-L5 intervertebral disc 03/18/2020     Priority: Medium     Chronic right-sided low back pain with right-sided sciatica 02/28/2020     Priority: Medium     Vulvar dystrophy--BX LICHEN SCLEROSIS--TOPICORT--1/2019 02/13/2019     Priority: Medium     Social anxiety disorder 10/18/2017     Priority: Medium     ACP (advance care planning) 01/09/2017     Priority: Medium     Advance Care Planning 1/9/2017: ACP Review of Chart / Resources Provided:  Reviewed chart for advance care plan.  Melba Hill has no plan or code status on file. Discussed available resources and provided with information. Confirmed code status reflects current choices pending further ACP discussions.  Confirmed/documented legally designated decision makers.  Added by Ronna Fitzgerald             Type 2 diabetes mellitus without complication, without long-term current use of insulin (H) 12/20/2016     Priority: Medium     DDD (degenerative disc disease), lumbar 04/18/2016     Priority: Medium     Mixed hyperlipidemia 01/21/2016     Priority: Medium     Tobacco abuse, in remission      Priority: Medium     JIMÉNEZ (nonalcoholic steatohepatitis) 08/20/2014     Priority: Medium     Cervical high risk HPV (human papillomavirus) test--2011,   HRhpv negative with normal pap 2011     Priority: Medium     Esophageal reflux 2003     Priority: Medium      Past Medical History:   Diagnosis Date     Abnormal glandular Papanicolaou smear of cervix 2003     Cervical high risk HPV (human papillomavirus) test 2011     Diabetes mellitus (H)      Dysplasia of cervix (uteri) 2003     Esophageal reflux 2003     Hiatal hernia 2011     Hyperlipidemia 2011     JIMÉNEZ (nonalcoholic steatohepatitis) 2014     Tobacco abuse 2011    Quit 2013     Tobacco abuse, in remission      Past Surgical History:   Procedure Laterality Date     COLONOSCOPY  2014    Repeat in /Procedure: COLONOSCOPY;  Surgeon: Beba Sepulveda MD;  Location: [facility]     D & C      Missed      ESOPHAGOGASTRODUODENOSCOPY       exercise stress-negative       IR CONSULTATION FOR IR EXAM  2020     LEEP TX, CERVICAL  ??    Dr Irving     TUBAL LIGATION       Current Outpatient Medications   Medication Sig Dispense Refill     ACCU-CHEK FASTCLIX LANCETS MISC Please use fresh needle to check blood glucose three times a day 100 each 12     ACCU-CHEK SMARTVIEW test strip USE TO TEST BLOOD SUGARS THREE TIMES DAILY 100 each 11     gabapentin (NEURONTIN) 300 MG capsule Take 1 capsule (300 mg) by mouth daily 90 capsule 3     lisinopril (ZESTRIL) 2.5 MG tablet Take 1 tablet (2.5 mg) by mouth daily 90 tablet 3     Lutein 20 MG CAPS Take by mouth daily       MAGNESIUM OXIDE PO Take by mouth daily       metFORMIN (GLUCOPHAGE-XR) 500 MG 24 hr tablet TAKE 4 TABLETS (2000MG) BY MOUTH DAILY WITH DINNER. 360 tablet 1     multivitamin w/minerals (THERA-VIT-M) tablet Take 1 tablet by mouth daily       nabumetone (RELAFEN) 750 MG tablet Take 1 tablet (750 mg) by mouth 2 times daily 30 tablet 1     NONFORMULARY Tumeric       NONFORMULARY Potassium 99 mg once daily       omega-3 acid ethyl esters (LOVAZA) 1 G capsule Take 2 g by mouth 2 times  "daily       omeprazole (PRILOSEC) 20 MG DR capsule Take 1 capsule (20 mg) by mouth daily 90 capsule 3     Semaglutide (RYBELSUS) 7 MG TABS Take 7 mg by mouth daily 90 tablet 1     simvastatin (ZOCOR) 20 MG tablet Take 1 tablet (20 mg) by mouth At Bedtime 90 tablet 3     tiZANidine (ZANAFLEX) 4 MG tablet Take 1 tablet (4 mg) by mouth 3 times daily as needed for muscle spasms 30 tablet 2     triamcinolone (KENALOG) 0.025 % cream Apply topically 2 times daily Apply to sites of tenderness or redness once a day. 80 g 1     triamcinolone (KENALOG) 0.1 % external ointment Apply topically 2 times daily For skin rash 80 g 1     vitamin  B complex with vitamin C (VITAMIN  B COMPLEX) TABS Take 1 tablet by mouth daily       VITAMIN D, CHOLECALCIFEROL, PO Take 1,000 Units by mouth daily         Allergies   Allergen Reactions     Beer Headache     abd pain     Soap      Dove, ivory and dial causes skin burns     Sulfa Drugs Hives     Sulfonamide antibiotics       Wine [Alcohol] Headache     abd pain        Social History     Tobacco Use     Smoking status: Former Smoker     Packs/day: 0.50     Years: 30.00     Pack years: 15.00     Types: Cigarettes     Quit date: 2013     Years since quittin.3     Smokeless tobacco: Never Used     Tobacco comment: tried to quit, no passive exposure, longest tobacco free: 1 year   Substance Use Topics     Alcohol use: Yes     Comment: occasionally     Family History   Problem Relation Age of Onset     Cancer Maternal Aunt         lung     Diabetes Mother      Heart Disease Mother         murmur     Other - See Comments Maternal Uncle         myocardial infarction     Diabetes Maternal Grandmother      History   Drug Use No         Objective     /60   Pulse 100   Temp 99  F (37.2  C)   Ht 1.695 m (5' 6.75\")   Wt 84.8 kg (187 lb)   LMP  (LMP Unknown)   SpO2 97%   BMI 29.51 kg/m      Physical Exam    GENERAL APPEARANCE: healthy, alert and no distress     EYES: EOMI, PERRL     " HENT: ear canals and TM's normal and nose and mouth without ulcers or lesions     NECK: no adenopathy, no asymmetry, masses, or scars and thyroid normal to palpation     RESP: lungs clear to auscultation - no rales, rhonchi or wheezes     CV: regular rates and rhythm, normal S1 S2, no S3 or S4 and no murmur, click or rub     ABDOMEN:  soft, nontender, no HSM or masses and bowel sounds normal     MS: extremities normal- no gross deformities noted, no evidence of inflammation in joints, FROM in all extremities.     SKIN: no suspicious lesions or rashes     NEURO: Normal strength and tone, sensory exam grossly normal, mentation intact and speech normal     PSYCH: mentation appears normal. and affect normal/bright     LYMPHATICS: No cervical adenopathy    Recent Labs   Lab Test 09/20/21  1342 06/18/21  1421 12/11/19  1555 12/11/19  1555   HGB  --  14.3  --   --    PLT  --  215  --   --    NA  --  137  --  141   POTASSIUM  --  3.7  --  3.8   CR  --  0.76  --  0.78   A1C 7.1* 10.5*   < > 8.0*    < > = values in this interval not displayed.        Diagnostics:  Recent Results (from the past 24 hour(s))   CBC with platelets and differential    Collection Time: 11/09/21  3:48 PM   Result Value Ref Range    WBC Count 6.0 4.0 - 11.0 10e3/uL    RBC Count 4.54 3.80 - 5.20 10e6/uL    Hemoglobin 13.1 11.7 - 15.7 g/dL    Hematocrit 41.0 35.0 - 47.0 %    MCV 90 78 - 100 fL    MCH 28.9 26.5 - 33.0 pg    MCHC 32.0 31.5 - 36.5 g/dL    RDW 13.0 10.0 - 15.0 %    Platelet Count 151 150 - 450 10e3/uL    % Neutrophils 80 %    % Lymphocytes 10 %    % Monocytes 7 %    % Eosinophils 2 %    % Basophils 1 %    % Immature Granulocytes 0 %    NRBCs per 100 WBC 0 <1 /100    Absolute Neutrophils 4.8 1.6 - 8.3 10e3/uL    Absolute Lymphocytes 0.6 (L) 0.8 - 5.3 10e3/uL    Absolute Monocytes 0.4 0.0 - 1.3 10e3/uL    Absolute Eosinophils 0.1 0.0 - 0.7 10e3/uL    Absolute Basophils 0.0 0.0 - 0.2 10e3/uL    Absolute Immature Granulocytes 0.0 <=0.0 10e3/uL     Absolute NRBCs 0.0 10e3/uL      EKG: appears normal, NSR, normal axis, normal intervals, no acute ST/T changes c/w ischemia, no LVH by voltage criteria, unchanged from previous tracings    Revised Cardiac Risk Index (RCRI):  The patient has the following serious cardiovascular risks for perioperative complications:   - No serious cardiac risks = 0 points     RCRI Interpretation: 0 points: Class I (very low risk - 0.4% complication rate)           Signed Electronically by: Alvin Connolly MD  Copy of this evaluation report is provided to requesting physician.

## 2021-11-09 ENCOUNTER — APPOINTMENT (OUTPATIENT)
Dept: LAB | Facility: OTHER | Age: 56
End: 2021-11-09
Attending: FAMILY MEDICINE
Payer: COMMERCIAL

## 2021-11-09 ENCOUNTER — OFFICE VISIT (OUTPATIENT)
Dept: FAMILY MEDICINE | Facility: OTHER | Age: 56
End: 2021-11-09
Attending: FAMILY MEDICINE
Payer: OTHER MISCELLANEOUS

## 2021-11-09 VITALS
TEMPERATURE: 99 F | SYSTOLIC BLOOD PRESSURE: 122 MMHG | HEART RATE: 100 BPM | BODY MASS INDEX: 29.35 KG/M2 | HEIGHT: 67 IN | OXYGEN SATURATION: 97 % | DIASTOLIC BLOOD PRESSURE: 60 MMHG | WEIGHT: 187 LBS

## 2021-11-09 DIAGNOSIS — Z01.818 PREOP GENERAL PHYSICAL EXAM: Primary | ICD-10-CM

## 2021-11-09 DIAGNOSIS — M51.369 DDD (DEGENERATIVE DISC DISEASE), LUMBAR: ICD-10-CM

## 2021-11-09 DIAGNOSIS — Z01.818 PREOP GENERAL PHYSICAL EXAM: ICD-10-CM

## 2021-11-09 DIAGNOSIS — K75.81 NASH (NONALCOHOLIC STEATOHEPATITIS): ICD-10-CM

## 2021-11-09 DIAGNOSIS — E11.9 TYPE 2 DIABETES MELLITUS WITHOUT COMPLICATION, WITHOUT LONG-TERM CURRENT USE OF INSULIN (H): ICD-10-CM

## 2021-11-09 DIAGNOSIS — M54.41 CHRONIC RIGHT-SIDED LOW BACK PAIN WITH RIGHT-SIDED SCIATICA: ICD-10-CM

## 2021-11-09 DIAGNOSIS — G89.29 CHRONIC RIGHT-SIDED LOW BACK PAIN WITH RIGHT-SIDED SCIATICA: ICD-10-CM

## 2021-11-09 DIAGNOSIS — M51.26 HERNIATION OF RIGHT SIDE OF L4-L5 INTERVERTEBRAL DISC: ICD-10-CM

## 2021-11-09 LAB
ANION GAP SERPL CALCULATED.3IONS-SCNC: 6 MMOL/L (ref 3–14)
BASOPHILS # BLD AUTO: 0 10E3/UL (ref 0–0.2)
BASOPHILS NFR BLD AUTO: 1 %
BUN SERPL-MCNC: 12 MG/DL (ref 7–30)
CALCIUM SERPL-MCNC: 9.8 MG/DL (ref 8.5–10.1)
CHLORIDE BLD-SCNC: 105 MMOL/L (ref 94–109)
CO2 SERPL-SCNC: 28 MMOL/L (ref 20–32)
CREAT SERPL-MCNC: 0.8 MG/DL (ref 0.52–1.04)
EOSINOPHIL # BLD AUTO: 0.1 10E3/UL (ref 0–0.7)
EOSINOPHIL NFR BLD AUTO: 2 %
ERYTHROCYTE [DISTWIDTH] IN BLOOD BY AUTOMATED COUNT: 13 % (ref 10–15)
GFR SERPL CREATININE-BSD FRML MDRD: 82 ML/MIN/1.73M2
GLUCOSE BLD-MCNC: 137 MG/DL (ref 70–99)
HCT VFR BLD AUTO: 41 % (ref 35–47)
HGB BLD-MCNC: 13.1 G/DL (ref 11.7–15.7)
IMM GRANULOCYTES # BLD: 0 10E3/UL
IMM GRANULOCYTES NFR BLD: 0 %
LYMPHOCYTES # BLD AUTO: 0.6 10E3/UL (ref 0.8–5.3)
LYMPHOCYTES NFR BLD AUTO: 10 %
MCH RBC QN AUTO: 28.9 PG (ref 26.5–33)
MCHC RBC AUTO-ENTMCNC: 32 G/DL (ref 31.5–36.5)
MCV RBC AUTO: 90 FL (ref 78–100)
MONOCYTES # BLD AUTO: 0.4 10E3/UL (ref 0–1.3)
MONOCYTES NFR BLD AUTO: 7 %
NEUTROPHILS # BLD AUTO: 4.8 10E3/UL (ref 1.6–8.3)
NEUTROPHILS NFR BLD AUTO: 80 %
NRBC # BLD AUTO: 0 10E3/UL
NRBC BLD AUTO-RTO: 0 /100
PLATELET # BLD AUTO: 151 10E3/UL (ref 150–450)
POTASSIUM BLD-SCNC: 4 MMOL/L (ref 3.4–5.3)
RBC # BLD AUTO: 4.54 10E6/UL (ref 3.8–5.2)
SODIUM SERPL-SCNC: 139 MMOL/L (ref 133–144)
WBC # BLD AUTO: 6 10E3/UL (ref 4–11)

## 2021-11-09 PROCEDURE — 36415 COLL VENOUS BLD VENIPUNCTURE: CPT | Performed by: FAMILY MEDICINE

## 2021-11-09 PROCEDURE — 80048 BASIC METABOLIC PNL TOTAL CA: CPT | Performed by: FAMILY MEDICINE

## 2021-11-09 PROCEDURE — 85025 COMPLETE CBC W/AUTO DIFF WBC: CPT | Performed by: FAMILY MEDICINE

## 2021-11-09 PROCEDURE — 99214 OFFICE O/P EST MOD 30 MIN: CPT | Performed by: FAMILY MEDICINE

## 2021-11-09 ASSESSMENT — PAIN SCALES - GENERAL: PAINLEVEL: MODERATE PAIN (4)

## 2021-11-09 ASSESSMENT — MIFFLIN-ST. JEOR: SCORE: 1456.89

## 2021-11-09 NOTE — NURSING NOTE
"Chief Complaint   Patient presents with     Pre-Op Exam       Initial /60   Pulse 100   Temp 99  F (37.2  C)   Ht 1.695 m (5' 6.75\")   Wt 84.8 kg (187 lb)   LMP  (LMP Unknown)   SpO2 97%   BMI 29.51 kg/m   Estimated body mass index is 29.51 kg/m  as calculated from the following:    Height as of this encounter: 1.695 m (5' 6.75\").    Weight as of this encounter: 84.8 kg (187 lb).  Medication Reconciliation: complete  Vikas Leggett LPN  "

## 2021-11-10 ENCOUNTER — TELEPHONE (OUTPATIENT)
Dept: FAMILY MEDICINE | Facility: OTHER | Age: 56
End: 2021-11-10
Payer: COMMERCIAL

## 2021-12-01 ENCOUNTER — TRANSFERRED RECORDS (OUTPATIENT)
Dept: MULTI SPECIALTY CLINIC | Facility: CLINIC | Age: 56
End: 2021-12-01

## 2021-12-01 LAB — RETINOPATHY: NORMAL

## 2021-12-29 ENCOUNTER — TRANSFERRED RECORDS (OUTPATIENT)
Dept: HEALTH INFORMATION MANAGEMENT | Facility: CLINIC | Age: 56
End: 2021-12-29

## 2021-12-29 LAB — RETINOPATHY: NEGATIVE

## 2021-12-31 DIAGNOSIS — E11.9 TYPE 2 DIABETES MELLITUS WITHOUT COMPLICATION, WITHOUT LONG-TERM CURRENT USE OF INSULIN (H): ICD-10-CM

## 2022-01-03 RX ORDER — METFORMIN HCL 500 MG
TABLET, EXTENDED RELEASE 24 HR ORAL
Qty: 360 TABLET | Refills: 0 | Status: SHIPPED | OUTPATIENT
Start: 2022-01-03 | End: 2022-03-22

## 2022-01-03 NOTE — TELEPHONE ENCOUNTER
metformin      Last Written Prescription Date:  6/18/21  Last Fill Quantity: 360,   # refills: 1  Last Office Visit: 11/9/21  Future Office visit:    Next 5 appointments (look out 90 days)    Jan 25, 2022  2:45 PM  (Arrive by 2:30 PM)  SHORT with Alvin Connolly MD  [facility] [place] ([facility] [place] ) [address]  [place] [address]  [phone]   Jan 26, 2022  2:15 PM  (Arrive by 2:00 PM)  Pre-Op physical with Alvin Connolly MD  [facility] [place] ([facility] [place] ) [address]  [place] [address]  [phone]

## 2022-01-22 ENCOUNTER — HEALTH MAINTENANCE LETTER (OUTPATIENT)
Age: 57
End: 2022-01-22

## 2022-01-24 NOTE — PROGRESS NOTES
PREOPERATIVE EVALUATION:  Today's date: 11/9/2021     Melba Hill is a 56 year old female who presents for a preoperative evaluation.     Surgical Information:  Surgery/Procedure: clean around L5 area  Surgery Location: [facility]  Surgeon: Dr Pugh  Surgery Date: 2/4/22  Time of Surgery: unknown  Where patient plans to recover: At home with family  Fax number for surgical facility:      Type of Anesthesia Anticipated: to be determined        Assessment & Plan         The proposed surgical procedure is considered INTERMEDIATE risk.       ICD-10-CM    1. Pre-operative cardiovascular examination  Z01.810 CBC with Platelets & Differential     Basic metabolic panel   2. Herniation of right side of L4-L5 intervertebral disc  M51.26 CBC with Platelets & Differential     Basic metabolic panel   3. Herniated nucleus pulposus, L5-S1, right  M51.27 CBC with Platelets & Differential     Basic metabolic panel   4. DDD (degenerative disc disease), lumbar  M51.36 CBC with Platelets & Differential     Basic metabolic panel   5. Chronic right-sided low back pain with right-sided sciatica  M54.41 CBC with Platelets & Differential    G89.29 Basic metabolic panel   6. Type 2 diabetes mellitus without complication, without long-term current use of insulin (H)  E11.9 CBC with Platelets & Differential     Basic metabolic panel   7. Tobacco abuse, in remission  F17.201 CBC with Platelets & Differential     Basic metabolic panel   8. Chronic kidney disease, stage 2 (mild)  N18.2 CBC with Platelets & Differential     Basic metabolic panel          NO COVID SWAB NEEDED - PT GOT COVID 11/15/21 TEST DATE -- SHE IS 2.5 MONTHS OUT FROM THIS DATE AND TIME OF SURGERY      PT IS NOT COVID VACCINATED. STILL REFUSES AT THIS TIME            Medication Instructions:  Patient is to take all scheduled medications on the day of surgery   Stop all supplements  And Relafen /Alleve now      NO Glucophage the night before     NO Rybelsius   And Zestril the morning of surgery       RECOMMENDATION:  APPROVAL GIVEN to proceed with proposed procedure, without further diagnostic evaluation.                                      Subjective         HPI related to upcoming procedure:   57 yO female  presents for cardiopulmonary/general clearance to undergo the above procedure.  His surgeon listed above has asked for this clearance to undergo anesthesia. Pt has had this condition for approximately  12/31/2018 - work injury.   Overall pt is of good health and has not  had any perioperative complications with previous surgeries.       Pt had covid 11/15/21      Needs follow-up on DM today . BS great and wt down.    Preop Questions 1/25/22   1. Have you ever had a heart attack or stroke? No   2. Have you ever had surgery on your heart or blood vessels, such as a stent placement, a coronary artery bypass, or surgery on an artery in your head, neck, heart, or legs? No   3. Do you have chest pain with activity? No   4. Do you have a history of  heart failure? No   5. Do you currently have a cold, bronchitis or symptoms of other infection? No   6. Do you have a cough, shortness of breath, or wheezing? No   7. Do you or anyone in your family have previous history of blood clots? no   8. Do you or does anyone in your family have a serious bleeding problem such as prolonged bleeding following surgeries or cuts? no   9. Have you ever had problems with anemia or been told to take iron pills? No   10. Have you had any abnormal blood loss such as black, tarry or bloody stools, or abnormal vaginal bleeding? No   11. Have you ever had a blood transfusion? UNKNOWN -    12. Are you willing to have a blood transfusion if it is medically needed before, during, or after your surgery? Yes   13. Have you or any of your relatives ever had problems with anesthesia? No   14. Do you have sleep apnea, excessive snoring or daytime drowsiness? No   15. Do you have any artifical heart valves  or other implanted medical devices like a pacemaker, defibrillator, or continuous glucose monitor? No   16. Do you have artificial joints? No   17. Are you allergic to latex? No   18. Is there any chance that you may be pregnant? No      Health Care Directive:  Patient does not have a Health Care Directive or Living Will: Discussed advance care planning with patient; however, patient declined at this time.     Preoperative Review of :   reviewed - no record of controlled substances prescribed.     Status of Chronic Conditions:  DIABETES - Patient has a longstanding history of DiabetesType Type II . Patient is being treated with diet and oral agents and denies significant side effects. Control has been good. Complicating factors include but are not limited to: hypertension and hyperlipidemia.         Review of Systems  Constitutional, neuro, ENT, endocrine, pulmonary, cardiac, gastrointestinal, genitourinary, musculoskeletal, integument and psychiatric systems are negative, except as otherwise noted.           Patient Active Problem List     Diagnosis Date Noted     Herniated nucleus pulposus, L5-S1, right 03/18/2020       Priority: Medium     Herniation of right side of L4-L5 intervertebral disc 03/18/2020       Priority: Medium     Chronic right-sided low back pain with right-sided sciatica 02/28/2020       Priority: Medium     Vulvar dystrophy--BX LICHEN SCLEROSIS--TOPICORT--1/2019 02/13/2019       Priority: Medium     Social anxiety disorder 10/18/2017       Priority: Medium     ACP (advance care planning) 01/09/2017       Priority: Medium       Advance Care Planning 1/9/2017: ACP Review of Chart / Resources Provided:  Reviewed chart for advance care plan.  Melba Hill has no plan or code status on file. Discussed available resources and provided with information. Confirmed code status reflects current choices pending further ACP discussions.  Confirmed/documented legally designated decision makers.   Added by Ronna Fitzgerald                 Type 2 diabetes mellitus without complication, without long-term current use of insulin (H) 2016       Priority: Medium     DDD (degenerative disc disease), lumbar 2016       Priority: Medium     Mixed hyperlipidemia 2016       Priority: Medium     Tobacco abuse, in remission         Priority: Medium     JIMÉNEZ (nonalcoholic steatohepatitis) 2014       Priority: Medium     Cervical high risk HPV (human papillomavirus) test--,  HRhpv negative with normal pap 2018 2011       Priority: Medium     Esophageal reflux 2003       Priority: Medium      Past Medical History        Past Medical History:   Diagnosis Date     Abnormal glandular Papanicolaou smear of cervix 2003     Cervical high risk HPV (human papillomavirus) test 2011     Diabetes mellitus (H)       Dysplasia of cervix (uteri) 2003     Esophageal reflux 2003     Hiatal hernia 2011     Hyperlipidemia 2011     JIMÉNEZ (nonalcoholic steatohepatitis) 2014     Tobacco abuse 2011     Quit 2013     Tobacco abuse, in remission           Past Surgical History         Past Surgical History:   Procedure Laterality Date     COLONOSCOPY   2014     Repeat in /Procedure: COLONOSCOPY;  Surgeon: Beba Sepulveda MD;  Location: [facility]     D & C         Missed      ESOPHAGOGASTRODUODENOSCOPY         exercise stress-negative         IR CONSULTATION FOR IR EXAM   2020     LEEP TX, CERVICAL   ??     Dr Irving     TUBAL LIGATION             Current Outpatient Prescriptions          Current Outpatient Medications   Medication Sig Dispense Refill     ACCU-CHEK FASTCLIX LANCETS MISC Please use fresh needle to check blood glucose three times a day 100 each 12     ACCU-CHEK SMARTVIEW test strip USE TO TEST BLOOD SUGARS THREE TIMES DAILY 100 each 11     gabapentin (NEURONTIN) 300 MG capsule Take 1 capsule (300 mg) by mouth daily 90 capsule  3     lisinopril (ZESTRIL) 2.5 MG tablet Take 1 tablet (2.5 mg) by mouth daily 90 tablet 3     Lutein 20 MG CAPS Take by mouth daily         MAGNESIUM OXIDE PO Take by mouth daily         metFORMIN (GLUCOPHAGE-XR) 500 MG 24 hr tablet TAKE 4 TABLETS (2000MG) BY MOUTH DAILY WITH DINNER. 360 tablet 1     multivitamin w/minerals (THERA-VIT-M) tablet Take 1 tablet by mouth daily         nabumetone (RELAFEN) 750 MG tablet Take 1 tablet (750 mg) by mouth 2 times daily 30 tablet 1     NONFORMULARY Tumeric         NONFORMULARY Potassium 99 mg once daily         omega-3 acid ethyl esters (LOVAZA) 1 G capsule Take 2 g by mouth 2 times daily         omeprazole (PRILOSEC) 20 MG DR capsule Take 1 capsule (20 mg) by mouth daily 90 capsule 3     Semaglutide (RYBELSUS) 7 MG TABS Take 7 mg by mouth daily 90 tablet 1     simvastatin (ZOCOR) 20 MG tablet Take 1 tablet (20 mg) by mouth At Bedtime 90 tablet 3     tiZANidine (ZANAFLEX) 4 MG tablet Take 1 tablet (4 mg) by mouth 3 times daily as needed for muscle spasms 30 tablet 2     triamcinolone (KENALOG) 0.025 % cream Apply topically 2 times daily Apply to sites of tenderness or redness once a day. 80 g 1     triamcinolone (KENALOG) 0.1 % external ointment Apply topically 2 times daily For skin rash 80 g 1     vitamin  B complex with vitamin C (VITAMIN  B COMPLEX) TABS Take 1 tablet by mouth daily         VITAMIN D, CHOLECALCIFEROL, PO Take 1,000 Units by mouth daily                      Allergies   Allergen Reactions     Beer Headache       abd pain     Soap         Dove, ivory and dial causes skin burns     Sulfa Drugs Hives       Sulfonamide antibiotics        Wine [Alcohol] Headache       abd pain         Social History            Tobacco Use     Smoking status: Former Smoker       Packs/day: 0.50       Years: 30.00       Pack years: 15.00       Types: Cigarettes       Quit date: 2013       Years since quittin.3     Smokeless tobacco: Never Used     Tobacco comment: tried  "to quit, no passive exposure, longest tobacco free: 1 year   Substance Use Topics     Alcohol use: Yes       Comment: occasionally      Family History         Family History   Problem Relation Age of Onset     Cancer Maternal Aunt           lung     Diabetes Mother       Heart Disease Mother           murmur     Other - See Comments Maternal Uncle           myocardial infarction     Diabetes Maternal Grandmother               History   Drug Use No               Objective    /64 (BP Location: Right arm, Patient Position: Sitting, Cuff Size: Adult Regular)   Pulse 71   Temp 97.7  F (36.5  C) (Tympanic)   Ht 1.695 m (5' 6.75\")   Wt 78.5 kg (173 lb)   LMP  (LMP Unknown)   SpO2 98%   BMI 27.30 kg/m              Physical Exam    GENERAL APPEARANCE: healthy, alert and no distress     EYES: EOMI, PERRL     HENT: ear canals and TM's normal and nose and mouth without ulcers or lesions     NECK: no adenopathy, no asymmetry, masses, or scars and thyroid normal to palpation     RESP: lungs clear to auscultation - no rales, rhonchi or wheezes     CV: regular rates and rhythm, normal S1 S2, no S3 or S4 and no murmur, click or rub     ABDOMEN:  soft, nontender, no HSM or masses and bowel sounds normal     MS: extremities normal- no gross deformities noted, no evidence of inflammation in joints, FROM in all extremities.     SKIN: no suspicious lesions or rashes     NEURO: Normal strength and tone, sensory exam grossly normal, mentation intact and speech normal     PSYCH: mentation appears normal. and affect normal/bright     LYMPHATICS: No cervical adenopathy            Recent Labs   Lab Test 09/20/21  1342 06/18/21  1421 12/11/19  1555 12/11/19  1555   HGB  --  14.3  --   --    PLT  --  215  --   --    NA  --  137  --  141   POTASSIUM  --  3.7  --  3.8   CR  --  0.76  --  0.78   A1C 7.1* 10.5*   < > 8.0*    < > = values in this interval not displayed.         Diagnostics:  Results for orders placed or performed in " visit on 01/25/22   Basic metabolic panel     Status: Normal   Result Value Ref Range    Sodium 139 133 - 144 mmol/L    Potassium 3.4 3.4 - 5.3 mmol/L    Chloride 106 94 - 109 mmol/L    Carbon Dioxide (CO2) 27 20 - 32 mmol/L    Anion Gap 6 3 - 14 mmol/L    Urea Nitrogen 14 7 - 30 mg/dL    Creatinine 0.72 0.52 - 1.04 mg/dL    Calcium 9.8 8.5 - 10.1 mg/dL    Glucose 90 70 - 99 mg/dL    GFR Estimate >90 >60 mL/min/1.73m2   CBC with platelets and differential     Status: None   Result Value Ref Range    WBC Count 7.7 4.0 - 11.0 10e3/uL    RBC Count 4.44 3.80 - 5.20 10e6/uL    Hemoglobin 13.3 11.7 - 15.7 g/dL    Hematocrit 40.3 35.0 - 47.0 %    MCV 91 78 - 100 fL    MCH 30.0 26.5 - 33.0 pg    MCHC 33.0 31.5 - 36.5 g/dL    RDW 13.5 10.0 - 15.0 %    Platelet Count 201 150 - 450 10e3/uL    % Neutrophils 52 %    % Lymphocytes 37 %    % Monocytes 8 %    % Eosinophils 2 %    % Basophils 1 %    % Immature Granulocytes 0 %    NRBCs per 100 WBC 0 <1 /100    Absolute Neutrophils 4.1 1.6 - 8.3 10e3/uL    Absolute Lymphocytes 2.8 0.8 - 5.3 10e3/uL    Absolute Monocytes 0.6 0.0 - 1.3 10e3/uL    Absolute Eosinophils 0.2 0.0 - 0.7 10e3/uL    Absolute Basophils 0.1 0.0 - 0.2 10e3/uL    Absolute Immature Granulocytes 0.0 <=0.4 10e3/uL    Absolute NRBCs 0.0 10e3/uL   CBC with Platelets & Differential     Status: None    Narrative    The following orders were created for panel order CBC with Platelets & Differential.  Procedure                               Abnormality         Status                     ---------                               -----------         ------                     CBC with platelets and d...[[id number]]                      Final result                 Please view results for these tests on the individual orders.   Results for orders placed or performed in visit on 01/25/22   Hemoglobin A1c     Status: Abnormal   Result Value Ref Range    Estimated Average Glucose 126 mg/dL    Hemoglobin A1C 6.0 (H) 0.0 - 5.6 %    Extra Green Top (Lithium Heparin) Tube     Status: None   Result Value Ref Range    Hold Specimen JIC    Extra Tube     Status: None    Narrative    The following orders were created for panel order Extra Tube.  Procedure                               Abnormality         Status                     ---------                               -----------         ------                     Extra Green Top (Lithium...[[id number]]                                                 Extra Green Top (Lithium...[[id number]]                      Final result                 Please view results for these tests on the individual orders.       .THIS                                                                                                      EKG: appears normal, NSR, normal axis, normal intervals, no acute ST/T changes c/w ischemia, no LVH by voltage criteria, unchanged from previous tracings     Revised Cardiac Risk Index (RCRI):  The patient has the following serious cardiovascular risks for perioperative complications:   - No serious cardiac risks = 0 points      RCRI Interpretation: 0 points: Class I (very low risk - 0.4% complication rate)                 Signed Electronically by: Alvin Connolly MD  Copy of this evaluation report is provided to requesting physician.

## 2022-01-25 ENCOUNTER — OFFICE VISIT (OUTPATIENT)
Dept: FAMILY MEDICINE | Facility: OTHER | Age: 57
End: 2022-01-25
Attending: FAMILY MEDICINE
Payer: COMMERCIAL

## 2022-01-25 ENCOUNTER — LAB (OUTPATIENT)
Dept: LAB | Facility: OTHER | Age: 57
End: 2022-01-25
Attending: FAMILY MEDICINE
Payer: COMMERCIAL

## 2022-01-25 VITALS
HEART RATE: 71 BPM | HEIGHT: 67 IN | TEMPERATURE: 97.7 F | WEIGHT: 173 LBS | SYSTOLIC BLOOD PRESSURE: 124 MMHG | OXYGEN SATURATION: 98 % | DIASTOLIC BLOOD PRESSURE: 64 MMHG | BODY MASS INDEX: 27.15 KG/M2

## 2022-01-25 DIAGNOSIS — G89.29 CHRONIC RIGHT-SIDED LOW BACK PAIN WITH RIGHT-SIDED SCIATICA: ICD-10-CM

## 2022-01-25 DIAGNOSIS — Z01.810 PRE-OPERATIVE CARDIOVASCULAR EXAMINATION: Primary | ICD-10-CM

## 2022-01-25 DIAGNOSIS — F17.201 TOBACCO ABUSE, IN REMISSION: ICD-10-CM

## 2022-01-25 DIAGNOSIS — M51.26 HERNIATION OF RIGHT SIDE OF L4-L5 INTERVERTEBRAL DISC: ICD-10-CM

## 2022-01-25 DIAGNOSIS — E11.9 TYPE 2 DIABETES MELLITUS WITHOUT COMPLICATION, WITHOUT LONG-TERM CURRENT USE OF INSULIN (H): ICD-10-CM

## 2022-01-25 DIAGNOSIS — R80.9 MICROALBUMINURIA DUE TO TYPE 2 DIABETES MELLITUS (H): ICD-10-CM

## 2022-01-25 DIAGNOSIS — M51.369 DDD (DEGENERATIVE DISC DISEASE), LUMBAR: ICD-10-CM

## 2022-01-25 DIAGNOSIS — M54.41 CHRONIC RIGHT-SIDED LOW BACK PAIN WITH RIGHT-SIDED SCIATICA: ICD-10-CM

## 2022-01-25 DIAGNOSIS — N18.2 CHRONIC KIDNEY DISEASE, STAGE 2 (MILD): ICD-10-CM

## 2022-01-25 DIAGNOSIS — M51.27 HERNIATED NUCLEUS PULPOSUS, L5-S1, RIGHT: ICD-10-CM

## 2022-01-25 DIAGNOSIS — E11.29 MICROALBUMINURIA DUE TO TYPE 2 DIABETES MELLITUS (H): ICD-10-CM

## 2022-01-25 PROBLEM — M48.062 LUMBAR STENOSIS WITH NEUROGENIC CLAUDICATION: Status: ACTIVE | Noted: 2021-01-28

## 2022-01-25 LAB
ANION GAP SERPL CALCULATED.3IONS-SCNC: 6 MMOL/L (ref 3–14)
BASOPHILS # BLD AUTO: 0.1 10E3/UL (ref 0–0.2)
BASOPHILS NFR BLD AUTO: 1 %
BUN SERPL-MCNC: 14 MG/DL (ref 7–30)
CALCIUM SERPL-MCNC: 9.8 MG/DL (ref 8.5–10.1)
CHLORIDE BLD-SCNC: 106 MMOL/L (ref 94–109)
CO2 SERPL-SCNC: 27 MMOL/L (ref 20–32)
CREAT SERPL-MCNC: 0.72 MG/DL (ref 0.52–1.04)
EOSINOPHIL # BLD AUTO: 0.2 10E3/UL (ref 0–0.7)
EOSINOPHIL NFR BLD AUTO: 2 %
ERYTHROCYTE [DISTWIDTH] IN BLOOD BY AUTOMATED COUNT: 13.5 % (ref 10–15)
EST. AVERAGE GLUCOSE BLD GHB EST-MCNC: 126 MG/DL
GFR SERPL CREATININE-BSD FRML MDRD: >90 ML/MIN/1.73M2
GLUCOSE BLD-MCNC: 90 MG/DL (ref 70–99)
HBA1C MFR BLD: 6 % (ref 0–5.6)
HCT VFR BLD AUTO: 40.3 % (ref 35–47)
HGB BLD-MCNC: 13.3 G/DL (ref 11.7–15.7)
HOLD SPECIMEN: NORMAL
IMM GRANULOCYTES # BLD: 0 10E3/UL
IMM GRANULOCYTES NFR BLD: 0 %
LYMPHOCYTES # BLD AUTO: 2.8 10E3/UL (ref 0.8–5.3)
LYMPHOCYTES NFR BLD AUTO: 37 %
MCH RBC QN AUTO: 30 PG (ref 26.5–33)
MCHC RBC AUTO-ENTMCNC: 33 G/DL (ref 31.5–36.5)
MCV RBC AUTO: 91 FL (ref 78–100)
MONOCYTES # BLD AUTO: 0.6 10E3/UL (ref 0–1.3)
MONOCYTES NFR BLD AUTO: 8 %
NEUTROPHILS # BLD AUTO: 4.1 10E3/UL (ref 1.6–8.3)
NEUTROPHILS NFR BLD AUTO: 52 %
NRBC # BLD AUTO: 0 10E3/UL
NRBC BLD AUTO-RTO: 0 /100
PLATELET # BLD AUTO: 201 10E3/UL (ref 150–450)
POTASSIUM BLD-SCNC: 3.4 MMOL/L (ref 3.4–5.3)
RBC # BLD AUTO: 4.44 10E6/UL (ref 3.8–5.2)
SODIUM SERPL-SCNC: 139 MMOL/L (ref 133–144)
WBC # BLD AUTO: 7.7 10E3/UL (ref 4–11)

## 2022-01-25 PROCEDURE — 99214 OFFICE O/P EST MOD 30 MIN: CPT | Performed by: FAMILY MEDICINE

## 2022-01-25 PROCEDURE — 85025 COMPLETE CBC W/AUTO DIFF WBC: CPT | Performed by: FAMILY MEDICINE

## 2022-01-25 PROCEDURE — 80048 BASIC METABOLIC PNL TOTAL CA: CPT | Performed by: FAMILY MEDICINE

## 2022-01-25 PROCEDURE — 83036 HEMOGLOBIN GLYCOSYLATED A1C: CPT

## 2022-01-25 PROCEDURE — 36415 COLL VENOUS BLD VENIPUNCTURE: CPT | Performed by: FAMILY MEDICINE

## 2022-01-25 ASSESSMENT — ANXIETY QUESTIONNAIRES
5. BEING SO RESTLESS THAT IT IS HARD TO SIT STILL: NOT AT ALL
GAD7 TOTAL SCORE: 0
7. FEELING AFRAID AS IF SOMETHING AWFUL MIGHT HAPPEN: NOT AT ALL
2. NOT BEING ABLE TO STOP OR CONTROL WORRYING: NOT AT ALL
3. WORRYING TOO MUCH ABOUT DIFFERENT THINGS: NOT AT ALL
6. BECOMING EASILY ANNOYED OR IRRITABLE: NOT AT ALL
1. FEELING NERVOUS, ANXIOUS, OR ON EDGE: NOT AT ALL
4. TROUBLE RELAXING: NOT AT ALL

## 2022-01-25 ASSESSMENT — MIFFLIN-ST. JEOR: SCORE: 1393.38

## 2022-01-25 ASSESSMENT — PATIENT HEALTH QUESTIONNAIRE - PHQ9: SUM OF ALL RESPONSES TO PHQ QUESTIONS 1-9: 0

## 2022-01-25 NOTE — PATIENT INSTRUCTIONS
Stop all supplements  And Relafen /Alleve now      NO Glucophage the night before     NO Rybelsius  And Zestril the morning of surgery

## 2022-01-25 NOTE — NURSING NOTE
"Chief Complaint   Patient presents with     Diabetes       Initial /64 (BP Location: Right arm, Patient Position: Sitting, Cuff Size: Adult Regular)   Pulse 71   Temp 97.7  F (36.5  C) (Tympanic)   Ht 1.695 m (5' 6.75\")   Wt 78.5 kg (173 lb)   LMP  (LMP Unknown)   SpO2 98%   BMI 27.30 kg/m   Estimated body mass index is 27.3 kg/m  as calculated from the following:    Height as of this encounter: 1.695 m (5' 6.75\").    Weight as of this encounter: 78.5 kg (173 lb).  Medication Reconciliation: complete  Nakia Izquierdo LPN  "

## 2022-01-26 ASSESSMENT — ANXIETY QUESTIONNAIRES: GAD7 TOTAL SCORE: 0

## 2022-01-28 ENCOUNTER — TELEPHONE (OUTPATIENT)
Dept: FAMILY MEDICINE | Facility: OTHER | Age: 57
End: 2022-01-28
Payer: COMMERCIAL

## 2022-03-14 ENCOUNTER — TRANSFERRED RECORDS (OUTPATIENT)
Dept: HEALTH INFORMATION MANAGEMENT | Facility: CLINIC | Age: 57
End: 2022-03-14

## 2022-03-15 NOTE — NURSING NOTE
"Chief Complaint   Patient presents with     Physical       Initial /70   Pulse 90   Temp 97.9  F (36.6  C)   Resp 18   Ht 1.695 m (5' 6.75\")   Wt 88.5 kg (195 lb)   LMP  (LMP Unknown)   SpO2 98%   BMI 30.77 kg/m   Estimated body mass index is 30.77 kg/m  as calculated from the following:    Height as of this encounter: 1.695 m (5' 6.75\").    Weight as of this encounter: 88.5 kg (195 lb).  Medication Reconciliation: complete  Vikas Leggett LPN  "
PAST SURGICAL HISTORY:  No significant past surgical history

## 2022-03-22 DIAGNOSIS — E11.9 TYPE 2 DIABETES MELLITUS WITHOUT COMPLICATION, WITHOUT LONG-TERM CURRENT USE OF INSULIN (H): ICD-10-CM

## 2022-03-22 RX ORDER — METFORMIN HCL 500 MG
TABLET, EXTENDED RELEASE 24 HR ORAL
Qty: 360 TABLET | Refills: 0 | Status: SHIPPED | OUTPATIENT
Start: 2022-03-22 | End: 2022-06-21

## 2022-03-22 NOTE — TELEPHONE ENCOUNTER
Metformin      Last Written Prescription Date:  1-3-2022  Last Fill Quantity: 360,   # refills: 0  Last Office Visit: 1-  Future Office visit:       Routing refill request to provider for review/approval because:

## 2022-03-23 DIAGNOSIS — K21.9 GASTROESOPHAGEAL REFLUX DISEASE WITHOUT ESOPHAGITIS: ICD-10-CM

## 2022-03-24 ENCOUNTER — HOSPITAL ENCOUNTER (OUTPATIENT)
Dept: PHYSICAL THERAPY | Facility: HOSPITAL | Age: 57
Setting detail: THERAPIES SERIES
Discharge: HOME | End: 2022-03-24
Attending: SPECIALIST
Payer: COMMERCIAL

## 2022-03-24 DIAGNOSIS — E11.9 TYPE 2 DIABETES MELLITUS WITHOUT COMPLICATION, WITHOUT LONG-TERM CURRENT USE OF INSULIN (H): Primary | ICD-10-CM

## 2022-03-24 PROCEDURE — 97161 PT EVAL LOW COMPLEX 20 MIN: CPT | Mod: GP

## 2022-03-24 PROCEDURE — 97110 THERAPEUTIC EXERCISES: CPT | Mod: GP

## 2022-03-24 RX ORDER — ORAL SEMAGLUTIDE 7 MG/1
TABLET ORAL
Qty: 90 TABLET | Refills: 1 | Status: SHIPPED | OUTPATIENT
Start: 2022-03-24 | End: 2022-07-08

## 2022-03-24 NOTE — PROGRESS NOTES
Initial Physical Therapy Evaluation      Name: Melba Hill MRN# [id number]   Age: 57 year old YOB: 1965     Date of Consultation: March 24, 2022  Primary care provider: Alvin Connolly    Referring Physician:  Dr. Pugh  Orders: Eval and Treat  Medical Diagnosis: L5/S1 Decompression  Onset of Illness/Injury: 3/24/2022     Reason for PT Visit: Patient is a 57 year old y/o who presents with low back pain after L5/S1 Decompression of the R side.  She is having similar pain after surgery and now having radicular symptoms down the L side.    Presents since: 2 years from back injury  Commenced as a result of: Work injury 2-3 years back that progressed to R sided low back surgery, now symptoms are on both sides.    Symptoms at onset (back/thigh/leg): R leg, down L leg and feels there is swelling at times into L foot that wasn't there previously   Constant/Intermittent symptoms: Sitting seems to improve symptoms, but sitting too long can make symptoms worse.  Symptoms in the leg feel better w/ sitting.  Walking increases symptoms but after 5 minutes her L leg starts to get numb.  There is pain and numbness in L leg.    Movement (worse/better): worse   Disturbed Sleep: Yes  Previous Spinal History: Lumbar decompression surgery of L5/S1   Previous Treatment: Therapy at [facility] in [place] prior to having back surgery.      Prior Level of Function: Independent   Pain: 4-8/10  Aching, Burning and Sharp    Community Support/Living Environment/Employment History: off work since incidence of injury - Patient plans to go back to work but her restrictions are off for at least another 6 weeks, and at that time may consider light duty.      Patient/Family Goal: To have less back pain     Fall Screen:   Have you fallen 2 or more times in the last year? No  Have you fallen and had an injury in the last year? No  Timed up & go: n/a  Is patient a fall risk? No    Past Medical History:   Past Medical History:   Diagnosis  Date     Abnormal glandular Papanicolaou smear of cervix 2003     Cervical high risk HPV (human papillomavirus) test 2011     Diabetes mellitus (H)      Dysplasia of cervix (uteri) 2003     Esophageal reflux 2003     Hiatal hernia 2011     Hyperlipidemia 2011     JIMÉNEZ (nonalcoholic steatohepatitis) 2014     Tobacco abuse 2011    Quit 2013     Tobacco abuse, in remission        Past Surgical History:  Past Surgical History:   Procedure Laterality Date     COLONOSCOPY  2014    Repeat in /Procedure: COLONOSCOPY;  Surgeon: Beba Sepulveda MD;  Location: [facility]     D & C      Missed      ESOPHAGOGASTRODUODENOSCOPY       exercise stress-negative       IR CONSULTATION FOR IR EXAM  2020     LEEP TX, CERVICAL  ??    Dr Irving     TUBAL LIGATION         Medications:   Current Outpatient Medications   Medication Sig     ACCU-CHEK FASTCLIX LANCETS MISC Please use fresh needle to check blood glucose three times a day     ACCU-CHEK SMARTVIEW test strip USE TO TEST BLOOD SUGARS THREE TIMES DAILY     gabapentin (NEURONTIN) 300 MG capsule Take 1 capsule (300 mg) by mouth daily     lisinopril (ZESTRIL) 2.5 MG tablet Take 1 tablet (2.5 mg) by mouth daily     Lutein 20 MG CAPS Take by mouth daily     MAGNESIUM OXIDE PO Take by mouth daily     metFORMIN (GLUCOPHAGE-XR) 500 MG 24 hr tablet TAKE 4 TABLETS (2000MG) BY MOUTH DAILY WITH DINNER.     multivitamin w/minerals (THERA-VIT-M) tablet Take 1 tablet by mouth daily     nabumetone (RELAFEN) 750 MG tablet Take 1 tablet (750 mg) by mouth 2 times daily     NONFORMULARY Tumeric     NONFORMULARY Potassium 99 mg once daily     omega-3 acid ethyl esters (LOVAZA) 1 G capsule Take 2 g by mouth 2 times daily     omeprazole (PRILOSEC) 20 MG DR capsule TAKE 1 CAPSULE (20 MG) BY MOUTH DAILY     RYBELSUS 7 MG TABS TAKE 1 TABLET DAILY BY MOUTH     simvastatin (ZOCOR) 20 MG tablet Take 1 tablet (20 mg) by mouth At Bedtime      "tiZANidine (ZANAFLEX) 4 MG tablet Take 1 tablet (4 mg) by mouth 3 times daily as needed for muscle spasms     triamcinolone (KENALOG) 0.025 % cream Apply topically 2 times daily Apply to sites of tenderness or redness once a day.     triamcinolone (KENALOG) 0.1 % external ointment Apply topically 2 times daily For skin rash     vitamin  B complex with vitamin C (VITAMIN  B COMPLEX) TABS Take 1 tablet by mouth daily     VITAMIN D, CHOLECALCIFEROL, PO Take 1,000 Units by mouth daily     No current facility-administered medications for this encounter.       Imaging:   No results found for this or any previous visit (from the past 744 hour(s)).     OBJECTIVE   Observation:   Patient appears to PT in no acute distress  Palpation:   Gait: Normal  Assistive devices:   Balance: Good     Posture:  Sitting Posture: Neutral  Change of Sitting Posture: No Effect   Standing: Neutral  Lateral Shift: Nil    Neurological Assessment:    Myotomes: Neg   Dermatomes: Neg   Reflexes:     Movement Screen:    Flexion: Major  Extension: Major  Side Gliding R: Minimum  Side Gliding L: Minimum  Other:       Pre-test  Flexion in standing:    During:     Repeated flexion in standing:  During:   After:   Extension in standing:   During:   Repeated extension in standing: During:   After:     Flexion in lying   During:   Repeated flexion in lying  During:   After:   Extension in lying   During:    Repeated extension in lying   During:   After:     Side glide in standing R:   During:   Repeated side glide in standing R: During:   After:   Side glide in standing L:   During:   Repeated side glide in standing L: During:   After:   Other movements:    Static Tests:  Lying prone in extension: 2 min prone on elbows - feeling pressure and \"kinked\" in this position but overall appears to tolerate well.       Outcome Measures:   Vianca STarT Sub-Score (Q5-9): 2  Vianca STarT Total Score (all 9): 4  Oswestry Score (%): 42 %     Prognosis/Plan of Care: " Patient has a good prognosis for the following goals  Appropriate for Physical Therapy Intervention: Yes     GOALS: 6-12 weeks  1. Patient will be consistent and compliant in HEP to reduce overall symptoms.  2. Patient will improve ability to walk for 30 minutes  3. Patient will bend, lift, and twist w/ light weights up to 10#  4. Patient will improve pain to 2/10 w/ activity and have reduced radicular symptoms in order to prepare to return to work     Planned Interventions: Therapeutic exercise, manual therapy, therapeutic activity, patient education     Clinical Impressions:  Criteria for Skilled Therapeutic Intervention Met: Yes  PT Diagnosis: Low back pain w/ B radiculopathy  Influenced by the following impairments: Low back pain, numbness/tingling, radiculopathy  Functional limitations due to impairment: Lifting, bending, twisting, walking, sitting, working  Clinical presentation: Stable/Uncomplicated  Clinical presentation rationale: Therapist Discretion  Clinical Decision making (complexity): Low Complexity  Predicted Duration of Therapy Intervention (days/wks): 2x  Risks and Benefits of therapy have been explained: Yes  Patient, Family & other staff in agreement with plan of care: Yes  Comments: Patient presents 7 weeks post op R sided L5/S1 decompression.  Now concerned of L sided radiculopathy, but vague and non dermatomal pattern described during session potentially related to edema following surgery.  Will benefit from lots of encouragement and gradual progression of core strengthening.  Will later work on bending, twisting, and gentle lifting as tolerated.    Date: 3/24/2022 - 6/22/22    Total Evaluation Time: 15    I certify the need for these services furnished under this plan of treatment and while under my care. (Physician co-signature of this document indicates review and certification of the therapy plan).      _____________________________     __________________________     ____________  Physician's Signature                 Date               Time

## 2022-03-24 NOTE — TELEPHONE ENCOUNTER
RYBELSUS 7 MG TABS  Last Written Prescription Date: 9/20/21  Last Fill Quantity: 90 # of Refills: 1  Last Office Visit: 1/25/22

## 2022-03-28 ENCOUNTER — HOSPITAL ENCOUNTER (OUTPATIENT)
Dept: PHYSICAL THERAPY | Facility: HOSPITAL | Age: 57
Setting detail: THERAPIES SERIES
Discharge: HOME | End: 2022-03-28
Attending: SPECIALIST
Payer: COMMERCIAL

## 2022-03-28 PROCEDURE — 97110 THERAPEUTIC EXERCISES: CPT | Mod: GP

## 2022-03-31 ENCOUNTER — HOSPITAL ENCOUNTER (OUTPATIENT)
Dept: PHYSICAL THERAPY | Facility: HOSPITAL | Age: 57
Setting detail: THERAPIES SERIES
Discharge: HOME | End: 2022-03-31
Attending: SPECIALIST
Payer: COMMERCIAL

## 2022-03-31 PROCEDURE — 97110 THERAPEUTIC EXERCISES: CPT | Mod: GP

## 2022-04-04 ENCOUNTER — HOSPITAL ENCOUNTER (OUTPATIENT)
Dept: PHYSICAL THERAPY | Facility: HOSPITAL | Age: 57
Setting detail: THERAPIES SERIES
Discharge: HOME | End: 2022-04-04
Attending: SPECIALIST
Payer: COMMERCIAL

## 2022-04-04 PROCEDURE — 97110 THERAPEUTIC EXERCISES: CPT | Mod: GP

## 2022-04-06 ENCOUNTER — HOSPITAL ENCOUNTER (OUTPATIENT)
Dept: PHYSICAL THERAPY | Facility: HOSPITAL | Age: 57
Setting detail: THERAPIES SERIES
Discharge: HOME | End: 2022-04-06
Attending: SPECIALIST
Payer: COMMERCIAL

## 2022-04-06 PROCEDURE — 97110 THERAPEUTIC EXERCISES: CPT | Mod: GP

## 2022-04-13 ENCOUNTER — HOSPITAL ENCOUNTER (OUTPATIENT)
Dept: PHYSICAL THERAPY | Facility: HOSPITAL | Age: 57
Setting detail: THERAPIES SERIES
Discharge: HOME | End: 2022-04-13
Attending: SPECIALIST
Payer: COMMERCIAL

## 2022-04-13 PROCEDURE — 97110 THERAPEUTIC EXERCISES: CPT | Mod: GP

## 2022-04-18 ENCOUNTER — HOSPITAL ENCOUNTER (OUTPATIENT)
Dept: PHYSICAL THERAPY | Facility: HOSPITAL | Age: 57
Setting detail: THERAPIES SERIES
Discharge: HOME | End: 2022-04-18
Attending: SPECIALIST
Payer: COMMERCIAL

## 2022-04-18 PROCEDURE — 97110 THERAPEUTIC EXERCISES: CPT | Mod: GP

## 2022-04-21 ENCOUNTER — HOSPITAL ENCOUNTER (OUTPATIENT)
Dept: PHYSICAL THERAPY | Facility: HOSPITAL | Age: 57
Setting detail: THERAPIES SERIES
Discharge: HOME | End: 2022-04-21
Attending: SPECIALIST
Payer: COMMERCIAL

## 2022-04-21 PROCEDURE — 97110 THERAPEUTIC EXERCISES: CPT | Mod: GP

## 2022-05-02 ENCOUNTER — TRANSFERRED RECORDS (OUTPATIENT)
Dept: HEALTH INFORMATION MANAGEMENT | Facility: CLINIC | Age: 57
End: 2022-05-02

## 2022-05-14 ENCOUNTER — HEALTH MAINTENANCE LETTER (OUTPATIENT)
Age: 57
End: 2022-05-14

## 2022-05-24 ENCOUNTER — MYC MEDICAL ADVICE (OUTPATIENT)
Dept: FAMILY MEDICINE | Facility: OTHER | Age: 57
End: 2022-05-24
Payer: COMMERCIAL

## 2022-05-24 DIAGNOSIS — R21 RASH AND NONSPECIFIC SKIN ERUPTION: Primary | ICD-10-CM

## 2022-05-24 RX ORDER — DESOXIMETASONE 0.5 MG/G
CREAM TOPICAL
Qty: 30 G | Refills: 3 | Status: SHIPPED | OUTPATIENT
Start: 2022-05-24

## 2022-05-24 NOTE — TELEPHONE ENCOUNTER
This med was discontinued on 6/18/2021.    Desoximetasone cream      Last Written Prescription Date:  4/3/2020  Last Fill Quantity: 40g,   # refills: 3  Last Office Visit: 1/25/2022  Future Office visit:    Next 5 appointments (look out 90 days)    Jul 27, 2022  9:15 AM  (Arrive by 9:00 AM)  SHORT with Alvin Connolly MD  [facility] [place] ([facility] [place] ) [address]  [place] [address]  [phone]           Routing refill request to provider for review/approval because:

## 2022-05-25 ENCOUNTER — TELEPHONE (OUTPATIENT)
Dept: FAMILY MEDICINE | Facility: OTHER | Age: 57
End: 2022-05-25
Payer: COMMERCIAL

## 2022-05-25 DIAGNOSIS — R21 RASH AND NONSPECIFIC SKIN ERUPTION: Primary | ICD-10-CM

## 2022-05-25 NOTE — TELEPHONE ENCOUNTER
Received PA from [facility] [employer] for Desoximetasone 0.05% cream. Submitted on CMM. Waiting for a response.

## 2022-05-27 RX ORDER — DESOXIMETASONE 2.5 MG/G
CREAM TOPICAL 2 TIMES DAILY
Qty: 15 G | Refills: 3 | Status: SHIPPED | OUTPATIENT
Start: 2022-05-27

## 2022-05-27 NOTE — TELEPHONE ENCOUNTER
Received DENIAL from [facility] for Desoximetasone 0.05% cream. 05/27/2022    Forms scanned to Epic

## 2022-06-14 DIAGNOSIS — E78.2 MIXED HYPERLIPIDEMIA: ICD-10-CM

## 2022-06-14 DIAGNOSIS — M51.27 HERNIATED NUCLEUS PULPOSUS, L5-S1, RIGHT: ICD-10-CM

## 2022-06-15 RX ORDER — GABAPENTIN 300 MG/1
300 CAPSULE ORAL DAILY
Qty: 90 CAPSULE | Refills: 3 | Status: SHIPPED | OUTPATIENT
Start: 2022-06-15

## 2022-06-15 RX ORDER — SIMVASTATIN 20 MG
20 TABLET ORAL AT BEDTIME
Qty: 90 TABLET | Refills: 0 | Status: SHIPPED | OUTPATIENT
Start: 2022-06-15 | End: 2022-09-19

## 2022-06-15 NOTE — TELEPHONE ENCOUNTER
gabapentin (NEURONTIN) 300 MG capsule      Last Written Prescription Date:  7/16/21  Last Fill Quantity: 90,   # refills: 3  Last Office Visit: 1/25/22  Future Office visit:    Next 5 appointments (look out 90 days)    Jul 19, 2022  2:15 PM  (Arrive by 2:00 PM)  SHORT with Alvin Connolly MD  [facility] [place] ([facility] [place] ) [address]  [place] [address]  [phone]   Jul 27, 2022  9:15 AM  (Arrive by 9:00 AM)  SHORT with Alvin Connolly MD  [facility] [place] ([facility] [place] ) [address]  [place] [address]  [phone]           Routing refill request to provider for review/approval because:  Drug not on the G, P or [facility] refill protocol or controlled substance

## 2022-07-07 DIAGNOSIS — E11.9 TYPE 2 DIABETES MELLITUS WITHOUT COMPLICATION, WITHOUT LONG-TERM CURRENT USE OF INSULIN (H): ICD-10-CM

## 2022-07-08 RX ORDER — ORAL SEMAGLUTIDE 7 MG/1
TABLET ORAL
Qty: 90 TABLET | Refills: 1 | Status: SHIPPED | OUTPATIENT
Start: 2022-07-08 | End: 2023-03-30

## 2022-07-08 NOTE — TELEPHONE ENCOUNTER
maxwell      Last Written Prescription Date:  3/24/22  Last Fill Quantity: 90,   # refills: 1  Last Office Visit: 1/25/22  Future Office visit:    Next 5 appointments (look out 90 days)    Jul 19, 2022  2:15 PM  (Arrive by 2:00 PM)  SHORT with Alvin Connolly MD  [facility] [place] ([facility] [place] ) [address] [place] [address]  [phone]   Jul 27, 2022  9:15 AM  (Arrive by 9:00 AM)  SHORT with Alvin Connolly MD  [facility] [place] ([facility] [place] ) [address] [place] [address]  [phone]

## 2022-07-19 ENCOUNTER — OFFICE VISIT (OUTPATIENT)
Dept: FAMILY MEDICINE | Facility: OTHER | Age: 57
End: 2022-07-19
Attending: FAMILY MEDICINE
Payer: OTHER MISCELLANEOUS

## 2022-07-19 VITALS
RESPIRATION RATE: 18 BRPM | SYSTOLIC BLOOD PRESSURE: 118 MMHG | TEMPERATURE: 97.3 F | BODY MASS INDEX: 28.4 KG/M2 | OXYGEN SATURATION: 98 % | HEART RATE: 71 BPM | WEIGHT: 180 LBS | DIASTOLIC BLOOD PRESSURE: 82 MMHG

## 2022-07-19 DIAGNOSIS — M51.26 HERNIATION OF RIGHT SIDE OF L4-L5 INTERVERTEBRAL DISC: ICD-10-CM

## 2022-07-19 DIAGNOSIS — M51.369 DDD (DEGENERATIVE DISC DISEASE), LUMBAR: Primary | ICD-10-CM

## 2022-07-19 DIAGNOSIS — M54.41 CHRONIC RIGHT-SIDED LOW BACK PAIN WITH RIGHT-SIDED SCIATICA: ICD-10-CM

## 2022-07-19 DIAGNOSIS — M48.062 LUMBAR STENOSIS WITH NEUROGENIC CLAUDICATION: ICD-10-CM

## 2022-07-19 DIAGNOSIS — M51.27 HERNIATED NUCLEUS PULPOSUS, L5-S1, LEFT: ICD-10-CM

## 2022-07-19 DIAGNOSIS — G89.29 CHRONIC RIGHT-SIDED LOW BACK PAIN WITH RIGHT-SIDED SCIATICA: ICD-10-CM

## 2022-07-19 PROCEDURE — 99215 OFFICE O/P EST HI 40 MIN: CPT | Performed by: FAMILY MEDICINE

## 2022-07-19 ASSESSMENT — PAIN SCALES - GENERAL: PAINLEVEL: MODERATE PAIN (4)

## 2022-07-19 NOTE — NURSING NOTE
"Chief Complaint   Patient presents with     Work Comp       Initial /82 (BP Location: Right arm, Patient Position: Sitting, Cuff Size: Adult Regular)   Pulse 71   Temp 97.3  F (36.3  C) (Tympanic)   Resp 18   Wt 81.6 kg (180 lb)   LMP  (LMP Unknown)   SpO2 98%   BMI 28.40 kg/m   Estimated body mass index is 28.4 kg/m  as calculated from the following:    Height as of 1/25/22: 1.695 m (5' 6.75\").    Weight as of this encounter: 81.6 kg (180 lb).  Medication Reconciliation: complete  Yani Victoria LPN  "

## 2022-07-19 NOTE — PROGRESS NOTES
Occupational Visit     SUBJECTIVE:  Melba Hill, 57 year old, female is seen for follow up of occupational injury. Date of injury is 1/31/2019, patient stated that injury 12/31/2018 was the first injury and 11/26/2019 was the second injury. We do not have proof of these dates being changed.    Linked Episodes   Type: Episode: Status: Noted: Resolved: Last update: Updated by:   WORK COMP [facility] Active 1/31/2019 7/19/2022  2:20 PM Marisa Castillo, ROS      Comments:       Back Pain       Duration: 3.5 years        Specific cause: lifting    Description:   Location of pain: low back bilateral and middle of back bilateral, left hip and left thigh  Character of pain: sharp, dull ache, stabbing, burning, cramping and constant  Pain radiation:radiates into the left buttocks and radiates into the left leg  New numbness or weakness in legs, not attributed to pain:  no     Intensity: Currently 4/10, At its worst 8/10    History:   Pain interferes with job: YES  History of back problems: previous spinal surgery - 2/4/2022-- Dr Pugh   Any previous MRI or X-rays: Yes- at [facility].  Date 3/10/2020, 2/12/2020  Sees a specialist for back pain:  Yes, Dr. Pugh, no contact or appointment made at this time  Therapies tried without relief: acetaminophen (Tylenol), cold, muscle relaxants, Physical Therapy, rest, sitting and surgery    Alleviating factors:   Improved by: cold      Precipitating factors:  Worsened by: Lifting, Bending, Standing and Walking    Still healing from surgery on 2/4/22-- right sided  L5 -S1 Decompression   Having residual issues on right  side -- pulling feeling of hamstring / some numbness of lateral knee and when lifting right knee up periodically- gets sharp pain in back   Left leg-- getting some pain in low back to pulling /achy/ numb feeling from lateral thigh to foot    Dr Pugh says she needs to heal and reassess in a year -- may need to look at L4-5  And left sided  sciatica sx      Pt has returned back to work [employer]/ part time  -- which is a new job and not where she worked before   Worked for total of 3 hrs / started on 7/15- several days ago  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 Doctor-- pt feels he has advocated for her in her complex case      said to see me.    Pt wants me updated on progress and wants me to be available if she can not handle current restrictions and current PCA position.         Allergies   Allergen Reactions     Beer Headache     abd pain     Shellfish-Derived Products Headache     Throat swelling     Soap      Dove, ivory and dial causes skin burns     Sulfa Drugs Hives     Sulfonamide antibiotics       Wine [Alcohol] Headache     abd pain         Review of Systems:  CONSTITUTIONAL:NEGATIVE for fever, chills, change in weight      OBJECTIVE:  Vitals:    07/19/22 1404   BP: 118/82   Pulse: 71   Resp: 18   Temp: 97.3  F (36.3  C)   SpO2: 98%               Exam:  GENERAL:: healthy, alert and no distress      Labs: No results found for this or any previous visit (from the past 24 hour(s)).      ASSESSMENT/PLAN:    ICD-10-CM    1. DDD (degenerative disc disease), lumbar  M51.36    2. Herniation of right side of L4-L5 intervertebral disc  M51.26    3. Chronic right-sided low back pain with right-sided sciatica  M54.41     G89.29    4. Lumbar stenosis with neurogenic claudication  M48.062    5. Herniated nucleus pulposus, L5-S1, left  M51.27      AFTER a long history taking of this very complex case and prolong history with several injuries along with her working with DR Baker and Dr Pugh--- I feel I  Would always help her in an extreme acute setting with this. Though I will not take the place  of DR Baker or Dr Pugh.  Pt needs to continue work with Dr Baker .  She feels DR TRIVEDI has been a good advocate for her and needs to continue to get her back on her feet and get thru the maze of issues with this work comp injury .  Will see on prn basis in emergency.  No change in meds or restrictions.     40 minutes total spent with pt / good hx/ looking back on chart notes/ discussing the 2 injuries and 2 employers and all the events that happened in last 3 plus years along with documentation and discussion

## 2022-07-27 ENCOUNTER — OFFICE VISIT (OUTPATIENT)
Dept: FAMILY MEDICINE | Facility: OTHER | Age: 57
End: 2022-07-27
Attending: FAMILY MEDICINE
Payer: COMMERCIAL

## 2022-07-27 ENCOUNTER — LAB (OUTPATIENT)
Dept: LAB | Facility: OTHER | Age: 57
End: 2022-07-27
Attending: FAMILY MEDICINE
Payer: COMMERCIAL

## 2022-07-27 VITALS
DIASTOLIC BLOOD PRESSURE: 77 MMHG | HEART RATE: 69 BPM | BODY MASS INDEX: 27.77 KG/M2 | RESPIRATION RATE: 16 BRPM | WEIGHT: 176 LBS | OXYGEN SATURATION: 98 % | SYSTOLIC BLOOD PRESSURE: 124 MMHG | TEMPERATURE: 97.4 F

## 2022-07-27 DIAGNOSIS — Z12.31 ENCOUNTER FOR SCREENING MAMMOGRAM FOR BREAST CANCER: ICD-10-CM

## 2022-07-27 DIAGNOSIS — E78.2 MIXED HYPERLIPIDEMIA: ICD-10-CM

## 2022-07-27 DIAGNOSIS — N18.2 CHRONIC KIDNEY DISEASE, STAGE 2 (MILD): ICD-10-CM

## 2022-07-27 DIAGNOSIS — E11.9 TYPE 2 DIABETES MELLITUS WITHOUT COMPLICATION, WITHOUT LONG-TERM CURRENT USE OF INSULIN (H): ICD-10-CM

## 2022-07-27 DIAGNOSIS — E11.9 TYPE 2 DIABETES MELLITUS WITHOUT COMPLICATION, WITHOUT LONG-TERM CURRENT USE OF INSULIN (H): Primary | ICD-10-CM

## 2022-07-27 LAB
ALBUMIN SERPL-MCNC: 3.6 G/DL (ref 3.4–5)
ALP SERPL-CCNC: 100 U/L (ref 40–150)
ALT SERPL W P-5'-P-CCNC: 42 U/L (ref 0–50)
ANION GAP SERPL CALCULATED.3IONS-SCNC: 7 MMOL/L (ref 3–14)
AST SERPL W P-5'-P-CCNC: 31 U/L (ref 0–45)
BASOPHILS # BLD AUTO: 0.1 10E3/UL (ref 0–0.2)
BASOPHILS NFR BLD AUTO: 1 %
BILIRUB SERPL-MCNC: 0.6 MG/DL (ref 0.2–1.3)
BUN SERPL-MCNC: 13 MG/DL (ref 7–30)
CALCIUM SERPL-MCNC: 9.2 MG/DL (ref 8.5–10.1)
CHLORIDE BLD-SCNC: 107 MMOL/L (ref 94–109)
CO2 SERPL-SCNC: 27 MMOL/L (ref 20–32)
CREAT SERPL-MCNC: 0.79 MG/DL (ref 0.52–1.04)
EOSINOPHIL # BLD AUTO: 0.2 10E3/UL (ref 0–0.7)
EOSINOPHIL NFR BLD AUTO: 2 %
ERYTHROCYTE [DISTWIDTH] IN BLOOD BY AUTOMATED COUNT: 12.9 % (ref 10–15)
EST. AVERAGE GLUCOSE BLD GHB EST-MCNC: 123 MG/DL
GFR SERPL CREATININE-BSD FRML MDRD: 86 ML/MIN/1.73M2
GLUCOSE BLD-MCNC: 103 MG/DL (ref 70–99)
HBA1C MFR BLD: 5.9 % (ref 0–5.6)
HCT VFR BLD AUTO: 39.5 % (ref 35–47)
HGB BLD-MCNC: 12.9 G/DL (ref 11.7–15.7)
IMM GRANULOCYTES # BLD: 0 10E3/UL
IMM GRANULOCYTES NFR BLD: 0 %
LYMPHOCYTES # BLD AUTO: 2.7 10E3/UL (ref 0.8–5.3)
LYMPHOCYTES NFR BLD AUTO: 35 %
MCH RBC QN AUTO: 29.5 PG (ref 26.5–33)
MCHC RBC AUTO-ENTMCNC: 32.7 G/DL (ref 31.5–36.5)
MCV RBC AUTO: 90 FL (ref 78–100)
MONOCYTES # BLD AUTO: 0.5 10E3/UL (ref 0–1.3)
MONOCYTES NFR BLD AUTO: 6 %
NEUTROPHILS # BLD AUTO: 4.4 10E3/UL (ref 1.6–8.3)
NEUTROPHILS NFR BLD AUTO: 56 %
NRBC # BLD AUTO: 0 10E3/UL
NRBC BLD AUTO-RTO: 0 /100
PLATELET # BLD AUTO: 209 10E3/UL (ref 150–450)
POTASSIUM BLD-SCNC: 3.5 MMOL/L (ref 3.4–5.3)
PROT SERPL-MCNC: 7.5 G/DL (ref 6.8–8.8)
RBC # BLD AUTO: 4.38 10E6/UL (ref 3.8–5.2)
SODIUM SERPL-SCNC: 141 MMOL/L (ref 133–144)
TSH SERPL DL<=0.005 MIU/L-ACNC: 1.56 MU/L (ref 0.4–4)
WBC # BLD AUTO: 7.9 10E3/UL (ref 4–11)

## 2022-07-27 PROCEDURE — 99214 OFFICE O/P EST MOD 30 MIN: CPT | Performed by: FAMILY MEDICINE

## 2022-07-27 PROCEDURE — 80050 GENERAL HEALTH PANEL: CPT

## 2022-07-27 PROCEDURE — 83036 HEMOGLOBIN GLYCOSYLATED A1C: CPT

## 2022-07-27 PROCEDURE — 36415 COLL VENOUS BLD VENIPUNCTURE: CPT

## 2022-07-27 RX ORDER — HYDROCODONE BITARTRATE AND ACETAMINOPHEN 5; 325 MG/1; MG/1
1-2 TABLET ORAL
COMMUNITY
Start: 2022-02-04

## 2022-07-27 ASSESSMENT — PAIN SCALES - GENERAL: PAINLEVEL: MODERATE PAIN (5)

## 2022-07-27 NOTE — PROGRESS NOTES
"  Assessment & Plan     Type 2 diabetes mellitus without complication, without long-term current use of insulin (H)  Doing great.  No issues. BS great control.  SULEIMAN for eye exam asked for - done in December. Continue current medications and behavioral changes.   Follow-up in 6 months   - Comprehensive metabolic panel; Future  - CBC with Platelets & Differential; Future  - TSH; Future  - Hemoglobin A1c; Future  - Albumin Random Urine Quantitative with Creat Ratio; Future    Chronic kidney disease, stage 2 (mild)  Stable   - Comprehensive metabolic panel; Future  - CBC with Platelets & Differential; Future  - TSH; Future  - Hemoglobin A1c; Future  - Albumin Random Urine Quantitative with Creat Ratio; Future    Mixed hyperlipidemia  Stable in past. On statin   - Comprehensive metabolic panel; Future  - CBC with Platelets & Differential; Future  - TSH; Future  - Hemoglobin A1c; Future  - Albumin Random Urine Quantitative with Creat Ratio; Future    Encounter for screening mammogram for breast cancer  Overdue. Will order and breast exam with full px in 6 months.  - MA Screening Digital Bilateral; Future             BMI:   Estimated body mass index is 27.77 kg/m  as calculated from the following:    Height as of 1/25/22: 1.695 m (5' 6.75\").    Weight as of this encounter: 79.8 kg (176 lb).           No follow-ups on file.    Alvin Connolly MD  [facility] [place]illy is a 57 year old, presenting for the following health issues:  Recheck Medication      HPI     Diabetes Follow-up    How often are you checking your blood sugar? One time daily  What time of day are you checking your blood sugars (select all that apply)?  Before meals  Have you had any blood sugars above 200?  No  Have you had any blood sugars below 70?  No    What symptoms do you notice when your blood sugar is low?  Shaky and Weak    What concerns do you have today about your diabetes? None     Do you have any of these " symptoms? (Select all that apply)  Numbness in feet    Have you had a diabetic eye exam in the last 12 months? Yes- Date of last eye exam: 12/2022,  Location: [facility]        BP Readings from Last 2 Encounters:   07/27/22 124/77   07/19/22 118/82     Hemoglobin A1C POCT (%)   Date Value   06/18/2021 10.5 (H)   12/11/2019 8.0 (H)     Hemoglobin A1C (%)   Date Value   01/25/2022 6.0 (H)   09/20/2021 7.1 (H)     LDL Cholesterol Calculated (mg/dL)   Date Value   06/18/2021 56   12/11/2019 125 (H)               Hyperlipidemia Follow-Up      Are you regularly taking any medication or supplement to lower your cholesterol?   Yes- simvastatin    Are you having muscle aches or other side effects that you think could be caused by your cholesterol lowering medication?  No    Chronic Kidney Disease Follow-up    Do you take any over the counter pain medicine?: Yes  What over the counter medicine are you taking for your pain?:  Aleve, tylenol      How often do you take this medicine?:  One time daily          Review of Systems   Constitutional, HEENT, cardiovascular, pulmonary, gi and gu systems are negative, except as otherwise noted.      Objective    /77 (BP Location: Right arm, Patient Position: Sitting, Cuff Size: Adult Large)   Pulse 69   Temp 97.4  F (36.3  C) (Tympanic)   Resp 16   Wt 79.8 kg (176 lb)   LMP  (LMP Unknown)   SpO2 98%   BMI 27.77 kg/m    Body mass index is 27.77 kg/m .  Physical Exam   GENERAL: healthy, alert and no distress  NECK: no adenopathy, no asymmetry, masses, or scars and thyroid normal to palpation  RESP: lungs clear to auscultation - no rales, rhonchi or wheezes  CV: regular rate and rhythm, normal S1 S2, no S3 or S4, no murmur, click or rub, no peripheral edema and peripheral pulses strong  ABDOMEN: soft, nontender, no hepatosplenomegaly, no masses and bowel sounds normal  MS: no gross musculoskeletal defects noted, no edema  Diabetic foot exam: normal DP and PT pulses, no trophic  changes or ulcerative lesions, normal sensory exam and normal monofilament exam    Results for orders placed or performed in visit on 07/27/22   Hemoglobin A1c     Status: Abnormal   Result Value Ref Range    Estimated Average Glucose 123 mg/dL    Hemoglobin A1C 5.9 (H) 0.0 - 5.6 %   TSH     Status: Normal   Result Value Ref Range    TSH 1.56 0.40 - 4.00 mU/L   Comprehensive metabolic panel     Status: Abnormal   Result Value Ref Range    Sodium 141 133 - 144 mmol/L    Potassium 3.5 3.4 - 5.3 mmol/L    Chloride 107 94 - 109 mmol/L    Carbon Dioxide (CO2) 27 20 - 32 mmol/L    Anion Gap 7 3 - 14 mmol/L    Urea Nitrogen 13 7 - 30 mg/dL    Creatinine 0.79 0.52 - 1.04 mg/dL    Calcium 9.2 8.5 - 10.1 mg/dL    Glucose 103 (H) 70 - 99 mg/dL    Alkaline Phosphatase 100 40 - 150 U/L    AST 31 0 - 45 U/L    ALT 42 0 - 50 U/L    Protein Total 7.5 6.8 - 8.8 g/dL    Albumin 3.6 3.4 - 5.0 g/dL    Bilirubin Total 0.6 0.2 - 1.3 mg/dL    GFR Estimate 86 >60 mL/min/1.73m2   CBC with platelets and differential     Status: None   Result Value Ref Range    WBC Count 7.9 4.0 - 11.0 10e3/uL    RBC Count 4.38 3.80 - 5.20 10e6/uL    Hemoglobin 12.9 11.7 - 15.7 g/dL    Hematocrit 39.5 35.0 - 47.0 %    MCV 90 78 - 100 fL    MCH 29.5 26.5 - 33.0 pg    MCHC 32.7 31.5 - 36.5 g/dL    RDW 12.9 10.0 - 15.0 %    Platelet Count 209 150 - 450 10e3/uL    % Neutrophils 56 %    % Lymphocytes 35 %    % Monocytes 6 %    % Eosinophils 2 %    % Basophils 1 %    % Immature Granulocytes 0 %    NRBCs per 100 WBC 0 <1 /100    Absolute Neutrophils 4.4 1.6 - 8.3 10e3/uL    Absolute Lymphocytes 2.7 0.8 - 5.3 10e3/uL    Absolute Monocytes 0.5 0.0 - 1.3 10e3/uL    Absolute Eosinophils 0.2 0.0 - 0.7 10e3/uL    Absolute Basophils 0.1 0.0 - 0.2 10e3/uL    Absolute Immature Granulocytes 0.0 <=0.4 10e3/uL    Absolute NRBCs 0.0 10e3/uL   CBC with Platelets & Differential     Status: None    Narrative    The following orders were created for panel order CBC with Platelets &  Differential.  Procedure                               Abnormality         Status                     ---------                               -----------         ------                     CBC with platelets and d...[[id number]]                      Final result                 Please view results for these tests on the individual orders.                   .  ..

## 2022-07-27 NOTE — NURSING NOTE
"Chief Complaint   Patient presents with     Recheck Medication       Initial /77 (BP Location: Right arm, Patient Position: Sitting, Cuff Size: Adult Large)   Pulse 69   Temp 97.4  F (36.3  C) (Tympanic)   Resp 16   Wt 79.8 kg (176 lb)   LMP  (LMP Unknown)   SpO2 98%   BMI 27.77 kg/m   Estimated body mass index is 27.77 kg/m  as calculated from the following:    Height as of 1/25/22: 1.695 m (5' 6.75\").    Weight as of this encounter: 79.8 kg (176 lb).  Medication Reconciliation: complete  Yani Victoria LPN  "

## 2022-08-22 ENCOUNTER — HOSPITAL ENCOUNTER (OUTPATIENT)
Dept: ULTRASOUND IMAGING | Facility: HOSPITAL | Age: 57
Discharge: HOME | End: 2022-08-22
Attending: FAMILY MEDICINE
Payer: COMMERCIAL

## 2022-08-22 ENCOUNTER — ANCILLARY PROCEDURE (OUTPATIENT)
Dept: MAMMOGRAPHY | Facility: OTHER | Age: 57
End: 2022-08-22
Attending: FAMILY MEDICINE
Payer: COMMERCIAL

## 2022-08-22 DIAGNOSIS — N64.59 INVERTED NIPPLE: ICD-10-CM

## 2022-08-22 DIAGNOSIS — Z12.31 ENCOUNTER FOR SCREENING MAMMOGRAM FOR BREAST CANCER: ICD-10-CM

## 2022-08-22 PROCEDURE — 76642 ULTRASOUND BREAST LIMITED: CPT | Mod: 50

## 2022-08-22 PROCEDURE — 77066 DX MAMMO INCL CAD BI: CPT | Mod: TC | Performed by: RADIOLOGY

## 2022-08-22 PROCEDURE — G0279 TOMOSYNTHESIS, MAMMO: HCPCS | Mod: TC | Performed by: RADIOLOGY

## 2022-09-04 ENCOUNTER — HEALTH MAINTENANCE LETTER (OUTPATIENT)
Age: 57
End: 2022-09-04

## 2022-09-18 DIAGNOSIS — E78.2 MIXED HYPERLIPIDEMIA: ICD-10-CM

## 2022-09-19 ENCOUNTER — TRANSFERRED RECORDS (OUTPATIENT)
Dept: HEALTH INFORMATION MANAGEMENT | Facility: CLINIC | Age: 57
End: 2022-09-19

## 2022-09-19 RX ORDER — SIMVASTATIN 20 MG
20 TABLET ORAL AT BEDTIME
Qty: 90 TABLET | Refills: 0 | Status: SHIPPED | OUTPATIENT
Start: 2022-09-19 | End: 2022-12-19

## 2022-09-19 NOTE — TELEPHONE ENCOUNTER
SIMVASTATIN 20MG TABLET        Last Written Prescription Date:  6/15/22  Last Fill Quantity: 90,   # refills: 0  Last Office Visit: 7/27/22  Future Office visit:       Routing refill request to provider for review/approval because:    Statins Protocol Failed 09/18/2022 05:01 AM   Protocol Details  LDL on file in past 12 months       LDL Cholesterol Calculated   Date Value Ref Range Status   06/18/2021 56 <100 mg/dL Final     Comment:     Desirable:       <100 mg/dl

## 2022-11-09 DIAGNOSIS — E11.9 TYPE 2 DIABETES MELLITUS WITHOUT COMPLICATION, WITHOUT LONG-TERM CURRENT USE OF INSULIN (H): Primary | ICD-10-CM

## 2022-11-28 NOTE — PROGRESS NOTES
Patient needs lab orders for microalbumin. Placing orders, patient to be called to scheduled a lab only appointment.

## 2022-12-13 DIAGNOSIS — E11.9 TYPE 2 DIABETES MELLITUS WITHOUT COMPLICATION, WITHOUT LONG-TERM CURRENT USE OF INSULIN (H): ICD-10-CM

## 2022-12-13 RX ORDER — METFORMIN HCL 500 MG
TABLET, EXTENDED RELEASE 24 HR ORAL
Qty: 360 TABLET | Refills: 1 | Status: SHIPPED | OUTPATIENT
Start: 2022-12-13 | End: 2023-06-12

## 2022-12-13 NOTE — TELEPHONE ENCOUNTER
metformin      Last Written Prescription Date:  6/21/2022  Last Fill Quantity: 360,   # refills: 1  Last Office Visit: 7/27/2022  Future Office visit:    Next 5 appointments (look out 90 days)    Jan 30, 2023  8:45 AM  (Arrive by 8:30 AM)  PHYSICAL with Alvin Connolly MD  [facility] [place] ([facility] [place] ) [address]  [place] [address]  [phone]           Routing refill request to provider for review/approval because:

## 2022-12-30 DIAGNOSIS — K21.9 GASTROESOPHAGEAL REFLUX DISEASE WITHOUT ESOPHAGITIS: ICD-10-CM

## 2022-12-30 NOTE — TELEPHONE ENCOUNTER
omeprazole (PRILOSEC) 20 MG DR capsule   Last Written Prescription Date:  3/23/2022  Last Fill Quantity: 90,   # refills: 2  Last Office Visit: 7/27/2022  Future Office visit:    Next 5 appointments (look out 90 days)    Jan 30, 2023  8:45 AM  (Arrive by 8:30 AM)  PHYSICAL with Alvin Connolly MD  [facility] [place] ([facility] [place] ) [address]  [place] [address]  [phone]

## 2023-01-15 ENCOUNTER — HEALTH MAINTENANCE LETTER (OUTPATIENT)
Age: 58
End: 2023-01-15

## 2023-03-29 DIAGNOSIS — E11.9 TYPE 2 DIABETES MELLITUS WITHOUT COMPLICATION, WITHOUT LONG-TERM CURRENT USE OF INSULIN (H): ICD-10-CM

## 2023-03-30 RX ORDER — ORAL SEMAGLUTIDE 7 MG/1
TABLET ORAL
Qty: 90 TABLET | Refills: 1 | Status: SHIPPED | OUTPATIENT
Start: 2023-03-30

## 2023-03-30 NOTE — TELEPHONE ENCOUNTER
RYBELSUS 7 MG TABS     Last Written Prescription Date:  7-8-22  Last Fill Quantity: 90,   # refills: 1  Last Office Visit: 7-27-22  Future Office visit:       Routing refill request to provider for review/approval because:  Drug not on the [facility], [facility] or [facility] refill protocol or controlled substance

## 2023-04-29 ENCOUNTER — HEALTH MAINTENANCE LETTER (OUTPATIENT)
Age: 58
End: 2023-04-29

## 2023-06-03 ENCOUNTER — HEALTH MAINTENANCE LETTER (OUTPATIENT)
Age: 58
End: 2023-06-03

## 2023-09-05 ENCOUNTER — TRANSFERRED RECORDS (OUTPATIENT)
Dept: HEALTH INFORMATION MANAGEMENT | Facility: CLINIC | Age: 58
End: 2023-09-05

## 2023-09-05 LAB — COLOGUARD-ABSTRACT: POSITIVE

## 2023-10-01 ENCOUNTER — HEALTH MAINTENANCE LETTER (OUTPATIENT)
Age: 58
End: 2023-10-01

## 2023-10-04 ENCOUNTER — OFFICE VISIT (OUTPATIENT)
Dept: SURGERY | Facility: OTHER | Age: 58
End: 2023-10-04
Attending: SURGERY
Payer: COMMERCIAL

## 2023-10-04 ENCOUNTER — HOSPITAL ENCOUNTER (OUTPATIENT)
Facility: HOSPITAL | Age: 58
End: 2023-10-04
Attending: SURGERY | Admitting: SURGERY
Payer: COMMERCIAL

## 2023-10-04 ENCOUNTER — PREP FOR PROCEDURE (OUTPATIENT)
Dept: SURGERY | Facility: OTHER | Age: 58
End: 2023-10-04

## 2023-10-04 VITALS
OXYGEN SATURATION: 98 % | DIASTOLIC BLOOD PRESSURE: 70 MMHG | SYSTOLIC BLOOD PRESSURE: 138 MMHG | HEART RATE: 71 BPM | HEIGHT: 67 IN | TEMPERATURE: 97.2 F | BODY MASS INDEX: 28.72 KG/M2 | WEIGHT: 183 LBS

## 2023-10-04 DIAGNOSIS — R19.5 POSITIVE COLORECTAL CANCER SCREENING USING COLOGUARD TEST: Primary | ICD-10-CM

## 2023-10-04 PROBLEM — L90.0 LICHEN SCLEROSUS: Status: ACTIVE | Noted: 2023-01-31

## 2023-10-04 PROCEDURE — 99203 OFFICE O/P NEW LOW 30 MIN: CPT | Performed by: SURGERY

## 2023-10-04 ASSESSMENT — PAIN SCALES - GENERAL: PAINLEVEL: NO PAIN (0)

## 2023-10-05 NOTE — PROGRESS NOTES
Surgery Consult Clinic Note      RE: Melba Hill  : 1965  DUANE: 10/4/2023      Chief Complaint:  Positive cologuard test     History of Present Illness:  Melba Hill is a very pleasant 58 year old year old female who I am seeing at the request of  Carlos Harris  for evaluation of screening colon malignant neoplasm and consideration for colonoscopy.  She denies family history of colon or rectal cancer, blood in stool, changes in bowel habits, weight loss, abdominal pain. She did recently do a cologuard test which was positive. She has had a colonoscopy about 9 years ago that did have some hyperplastic polyps Surgical hx: no abdominal She is feeling well.   Medical history:  Past Medical History:   Diagnosis Date    Abnormal glandular Papanicolaou smear of cervix 2003    Cervical high risk HPV (human papillomavirus) test 2011    Diabetes mellitus (H)     Dysplasia of cervix (uteri) 2003    Esophageal reflux 2003    Hiatal hernia 2011    Hyperlipidemia 2011    Inverted nipple 2022    right - new    JIMÉNEZ (nonalcoholic steatohepatitis) 2014    Tobacco abuse 2011    Quit 2013    Tobacco abuse, in remission        Surgical history:  Past Surgical History:   Procedure Laterality Date    COLONOSCOPY  2014    Repeat in /Procedure: COLONOSCOPY;  Surgeon: Beba Sepulveda MD;  Location: [facility]    D & C      Missed     ESOPHAGOGASTRODUODENOSCOPY      exercise stress-negative      IR CONSULTATION FOR IR EXAM  2020    LEEP TX, CERVICAL  ??    Dr Irving    TUBAL LIGATION         Family history:  Family History   Problem Relation Age of Onset    Diabetes Mother     Heart Disease Mother         murmur    Diabetes Maternal Grandmother     Cancer Maternal Aunt         lung    Breast Cancer Maternal Aunt         60-70's    Other - See Comments Maternal Uncle         myocardial infarction    Breast Cancer Maternal Cousin         under 50        Medications:  Current Outpatient Medications   Medication Sig Dispense Refill    ACCU-CHEK FASTCLIX LANCETS [facility] [place] use fresh needle to check blood glucose three times a day 100 each 12    ACCU-CHEK SMARTVIEW test strip USE TO TEST BLOOD SUGARS THREE TIMES DAILY 100 each 11    desoximetasone (TOPICORT LP) 0.05 % external cream Apply twice daily as needed 30 g 3    desoximetasone (TOPICORT) 0.25 % external cream Apply topically 2 times daily 15 g 3    gabapentin (NEURONTIN) 300 MG capsule TAKE 1 CAPSULE (300 MG) BY MOUTH DAILY 90 capsule 3    HYDROcodone-acetaminophen (NORCO) 5-325 MG tablet Take 1-2 tablets by mouth      Lutein 20 MG CAPS Take by mouth daily      MAGNESIUM OXIDE PO Take by mouth daily      metFORMIN (GLUCOPHAGE XR) 500 MG 24 hr tablet TAKE 4 TABLETS (2000MG) BY MOUTH DAILY WITH DINNER. 360 tablet 0    multivitamin w/minerals (THERA-VIT-M) tablet Take 1 tablet by mouth daily      NONFORMULARY Tumeric      NONFORMULARY Potassium 99 mg once daily      omega-3 acid ethyl esters (LOVAZA) 1 G capsule Take 2 g by mouth 2 times daily      omeprazole (PRILOSEC) 20 MG DR capsule TAKE 1 CAPSULE (20 MG) BY MOUTH DAILY 90 capsule 2    RYBELSUS 7 MG tablet TAKE ONE TABLET BY MOUTH DAILY 90 tablet 1    simvastatin (ZOCOR) 20 MG tablet TAKE 1 TABLET (20 MG) BY MOUTH AT BEDTIME 90 tablet 0    tiZANidine (ZANAFLEX) 4 MG tablet Take 1 tablet (4 mg) by mouth 3 times daily as needed for muscle spasms 30 tablet 2    triamcinolone (KENALOG) 0.025 % cream Apply topically 2 times daily Apply to sites of tenderness or redness once a day. 80 g 1    triamcinolone (KENALOG) 0.1 % external ointment Apply topically 2 times daily For skin rash 80 g 1    vitamin B complex with vitamin C (STRESS TAB) tablet Take 1 tablet by mouth daily      VITAMIN D, CHOLECALCIFEROL, PO Take 1,000 Units by mouth daily       Allergies:  The patientis allergic to beer, shellfish-derived products, soap, sulfa antibiotics, and wine  "[alcohol].  .  Social history:  Social History     Tobacco Use    Smoking status: Former     Packs/day: 0.50     Years: 30.00     Pack years: 15.00     Types: Cigarettes     Quit date: 7/4/2013     Years since quitting: 10.2    Smokeless tobacco: Never    Tobacco comments:     tried to quit, no passive exposure, longest tobacco free: 1 year   Substance Use Topics    Alcohol use: Yes     Comment: occasionally     Marital status: .  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    Review of Systems:  10 point review of systems was obtained and negative other than what previously mentioned in HPI    Physical Examination:  /70 (BP Location: Left arm, Cuff Size: Adult Regular)   Pulse 71   Temp 97.2  F (36.2  C) (Tympanic)   Ht 1.702 m (5' 7\")   Wt 83 kg (183 lb)   LMP  (LMP Unknown)   SpO2 98%   BMI 28.66 kg/m    General: AAOx4, NAD, WN/WD, ambulating without assistance  HEENT:NCAT, EOMI, PERRL Sclerae anicteric; Trachea mideline,   Chest:  No acute distress, CTAB   Cardiac:  regular rate and rhythm, no murmur   Abdomen: non-distended   Extremities: Cursory exam unremarkable.  Skin: Warm, dry,   Neuro: no focal deficit,   Psych: happy, calm, asks appropriate questions      Assessment/Plan:  58 y.o. female comes to clinic after having a positive cologuard test. She is average risk with prior polyps being hyperplastic.   Satisfactory candidate for colonoscopy.  The indications, risks, benefits and technical aspects of whole colon colonoscopy were outlined with risks including, but not limited to, perforation, bleeding and inability to visualize entire colon.  Management of each was reviewed.  The need of mechanical preparation of the colon was reviewed along with the use of monitored anesthetic care.  The patient's questions were asked and answered.      Albert White MD      "

## 2023-11-03 NOTE — OR NURSING
"Patient called to reschedule Colonoscopy with Dr White on 11/14/23 States \"something else came up\" Transferred call to Dr White's office to reschedule.  "

## 2023-11-28 RX ORDER — LIDOCAINE 40 MG/G
CREAM TOPICAL
Status: CANCELLED | OUTPATIENT
Start: 2023-11-28

## 2023-11-28 RX ORDER — ONDANSETRON 4 MG/1
4 TABLET, ORALLY DISINTEGRATING ORAL EVERY 30 MIN PRN
Status: CANCELLED | OUTPATIENT
Start: 2023-11-28

## 2023-11-28 RX ORDER — ONDANSETRON 2 MG/ML
4 INJECTION INTRAMUSCULAR; INTRAVENOUS EVERY 30 MIN PRN
Status: CANCELLED | OUTPATIENT
Start: 2023-11-28

## 2023-11-28 RX ORDER — SODIUM CHLORIDE, SODIUM LACTATE, POTASSIUM CHLORIDE, CALCIUM CHLORIDE 600; 310; 30; 20 MG/100ML; MG/100ML; MG/100ML; MG/100ML
INJECTION, SOLUTION INTRAVENOUS CONTINUOUS
Status: CANCELLED | OUTPATIENT
Start: 2023-11-28

## 2023-12-02 DIAGNOSIS — K21.9 GASTROESOPHAGEAL REFLUX DISEASE WITHOUT ESOPHAGITIS: ICD-10-CM

## 2023-12-04 NOTE — TELEPHONE ENCOUNTER
Omeprazole      Last Written Prescription Date:  12/30/22  Last Fill Quantity: 90,   # refills: 2  Last Office Visit: 7/27/22  Future Office visit:       Routing refill request to provider for review/approval because:

## 2023-12-12 DIAGNOSIS — K21.9 GASTROESOPHAGEAL REFLUX DISEASE WITHOUT ESOPHAGITIS: ICD-10-CM

## 2024-02-18 ENCOUNTER — HEALTH MAINTENANCE LETTER (OUTPATIENT)
Age: 59
End: 2024-02-18

## 2024-04-15 ENCOUNTER — APPOINTMENT (OUTPATIENT)
Dept: GENERAL RADIOLOGY | Facility: HOSPITAL | Age: 59
End: 2024-04-15
Payer: COMMERCIAL

## 2024-04-15 ENCOUNTER — HOSPITAL ENCOUNTER (EMERGENCY)
Facility: HOSPITAL | Age: 59
Discharge: HOME | End: 2024-04-15
Payer: COMMERCIAL

## 2024-04-15 VITALS
OXYGEN SATURATION: 99 % | TEMPERATURE: 98 F | HEIGHT: 67 IN | HEART RATE: 89 BPM | BODY MASS INDEX: 27.47 KG/M2 | DIASTOLIC BLOOD PRESSURE: 89 MMHG | WEIGHT: 175 LBS | SYSTOLIC BLOOD PRESSURE: 144 MMHG | RESPIRATION RATE: 18 BRPM

## 2024-04-15 DIAGNOSIS — J01.90 ACUTE SINUSITIS WITH SYMPTOMS > 10 DAYS: ICD-10-CM

## 2024-04-15 DIAGNOSIS — J20.9 ACUTE BRONCHITIS WITH SYMPTOMS > 10 DAYS: ICD-10-CM

## 2024-04-15 PROCEDURE — G0463 HOSPITAL OUTPT CLINIC VISIT: HCPCS | Mod: 25

## 2024-04-15 PROCEDURE — 99213 OFFICE O/P EST LOW 20 MIN: CPT

## 2024-04-15 PROCEDURE — 71046 X-RAY EXAM CHEST 2 VIEWS: CPT

## 2024-04-15 ASSESSMENT — ENCOUNTER SYMPTOMS
NAUSEA: 0
COUGH: 1
FEVER: 0
SHORTNESS OF BREATH: 1
VOMITING: 0
ABDOMINAL PAIN: 0
ACTIVITY CHANGE: 1
FATIGUE: 1
DIARRHEA: 0
CHILLS: 0
SORE THROAT: 0
SINUS PRESSURE: 1

## 2024-04-15 ASSESSMENT — ACTIVITIES OF DAILY LIVING (ADL): ADLS_ACUITY_SCORE: 35

## 2024-04-15 NOTE — DISCHARGE INSTRUCTIONS
Antibiotics 2 times daily for 7 days. Yogurt or probiotic while taking.   Push fluids.   Tylenol and ibuprofen as needed.   Return with any chest pain, increased shortness of breath, or other concerns.   Follow up in the clinic for a recheck.

## 2024-04-15 NOTE — ED TRIAGE NOTES
Pt presents with c/o SOB, lightheadedness, cough, fatigue symptoms have been ongoing for approx. 4 weeks. Otc tylenol. Pt reports previous use of cough/cold medications.

## 2024-04-15 NOTE — ED TRIAGE NOTES
LEIGH Casey CNP assessed patient in triage and determined patient Urgent Care appropriate. Will be seen in Urgent Care.

## 2024-04-15 NOTE — ED PROVIDER NOTES
History     Chief Complaint   Patient presents with    Cold Symptoms     HPI  Melba Hill is a 59 year old female who presents [facility] the urgent acre with a 4 week history of cough, fatigue, congestion, and shortness of breath. Mother recently dx with influenza. She denies fevers, chest pain, abd pain, and n/v/d. Non smoker. No hx of asthma or COPD. No recent abx. Has tried tylenol and OTC cough medication with minimal relief.     Allergies:  Allergies   Allergen Reactions    Beer Headache     abd pain    Shellfish-Derived Products Headache     Throat swelling    Soap      Dove, ivory and dial causes skin burns    Sulfa Antibiotics Hives     Sulfonamide antibiotics      Wine [Alcohol] Headache     abd pain       Problem List:    Patient Active Problem List    Diagnosis Date Noted    Lichen sclerosus 01/31/2023     Priority: Medium    Chronic kidney disease, stage 2 (mild) 01/25/2022     Priority: Medium    Lumbar stenosis with neurogenic claudication 01/28/2021     Priority: Medium    Herniated nucleus pulposus, L5-S1, left 03/18/2020     Priority: Medium    Herniation of right side of L4-L5 intervertebral disc 03/18/2020     Priority: Medium    Chronic right-sided low back pain with right-sided sciatica 02/28/2020     Priority: Medium    Vulvar dystrophy--BX LICHEN SCLEROSIS--TOPICORT--1/2019 02/13/2019     Priority: Medium    Social anxiety disorder 10/18/2017     Priority: Medium    ACP (advance care planning) 01/09/2017     Priority: Medium     Advance Care Planning 1/9/2017: ACP Review of Chart / Resources Provided:  Reviewed chart for advance care plan.  Melba Hill has no plan or code status on file. Discussed available resources and provided with information. Confirmed code status reflects current choices pending further ACP discussions.  Confirmed/documented legally designated decision makers.  Added by Ronna Fitzgerald            Type 2 diabetes mellitus without complication, without long-term current  use of insulin (H) 2016     Priority: Medium    DDD (degenerative disc disease), lumbar 2016     Priority: Medium    Mixed hyperlipidemia 2016     Priority: Medium    Tobacco abuse, in remission      Priority: Medium    JIMÉNEZ (nonalcoholic steatohepatitis) 2014     Priority: Medium    Cervical high risk HPV (human papillomavirus) test--,  HRhpv negative with normal pap 2011     Priority: Medium    Esophageal reflux 2003     Priority: Medium        Past Medical History:    Past Medical History:   Diagnosis Date    Abnormal glandular Papanicolaou smear of cervix 2003    Cervical high risk HPV (human papillomavirus) test 2011    Diabetes mellitus (H)     Dysplasia of cervix (uteri) 2003    Esophageal reflux 2003    Hiatal hernia 2011    Hyperlipidemia 2011    Inverted nipple 2022    JIMÉNEZ (nonalcoholic steatohepatitis) 2014    Tobacco abuse 2011    Tobacco abuse, in remission        Past Surgical History:    Past Surgical History:   Procedure Laterality Date    COLONOSCOPY  2014    Repeat in /Procedure: COLONOSCOPY;  Surgeon: Beba Sepulveda MD;  Location: [facility]    D & C      Missed     ESOPHAGOGASTRODUODENOSCOPY      exercise stress-negative      IR CONSULTATION FOR IR EXAM  2020    LEEP TX, CERVICAL  ??    Dr Irving    TUBAL LIGATION         Family History:    Family History   Problem Relation Age of Onset    Diabetes Mother     Heart Disease Mother         murmur    Diabetes Maternal Grandmother     Cancer Maternal Aunt         lung    Breast Cancer Maternal Aunt         60-70's    Other - See Comments Maternal Uncle         myocardial infarction    Breast Cancer Maternal Cousin         under 50       Social History:  Marital Status:   [2]  Social History     Tobacco Use    Smoking status: Former     Current packs/day: 0.00     Average packs/day: 0.5 packs/day for 30.0 years (15.0 ttl  "pk-yrs)     Types: Cigarettes     Start date: 7/4/1983     Quit date: 7/4/2013     Years since quitting: 10.7    Smokeless tobacco: Never    Tobacco comments:     tried to quit, no passive exposure, longest tobacco free: 1 year   Vaping Use    Vaping status: Never Used   Substance Use Topics    Alcohol use: Yes     Comment: occasionally    Drug use: No        Medications:    ACCU-CHEK FASTCLIX LANCETS MISC  ACCU-CHEK SMARTVIEW test strip  amoxicillin-clavulanate (AUGMENTIN) 875-125 MG tablet  desoximetasone (TOPICORT LP) 0.05 % external cream  desoximetasone (TOPICORT) 0.25 % external cream  gabapentin (NEURONTIN) 300 MG capsule  MAGNESIUM OXIDE PO  metFORMIN (GLUCOPHAGE XR) 500 MG 24 hr tablet  multivitamin w/minerals (THERA-VIT-M) tablet  NONFORMULARY  NONFORMULARY  omega-3 acid ethyl esters (LOVAZA) 1 G capsule  omeprazole (PRILOSEC) 20 MG DR capsule  RYBELSUS 7 MG tablet  simvastatin (ZOCOR) 20 MG tablet  tiZANidine (ZANAFLEX) 4 MG tablet  triamcinolone (KENALOG) 0.025 % cream  triamcinolone (KENALOG) 0.1 % external ointment  vitamin B complex with vitamin C (STRESS TAB) tablet  VITAMIN D, CHOLECALCIFEROL, PO  HYDROcodone-acetaminophen (NORCO) 5-325 MG tablet  Lutein 20 MG CAPS          Review of Systems   Constitutional:  Positive for activity change and fatigue. Negative for chills and fever.   HENT:  Positive for congestion, ear pain and sinus pressure. Negative for sore throat.    Respiratory:  Positive for cough and shortness of breath.    Cardiovascular:  Negative for chest pain.   Gastrointestinal:  Negative for abdominal pain, diarrhea, nausea and vomiting.   All other systems reviewed and are negative.      Physical Exam   BP: 144/89  Pulse: 89  Temp: 98  F (36.7  C)  Resp: 18  Height: 170.2 cm (5' 7\")  Weight: 79.4 kg (175 lb)  SpO2: 99 %      Physical Exam  Vitals and nursing note reviewed.   Constitutional:       General: She is not in acute distress.     Appearance: Normal appearance. She is " ill-appearing. She is not toxic-appearing or diaphoretic.   HENT:      Right Ear: Tympanic membrane is not erythematous.      Left Ear: Tympanic membrane is not erythematous.      Nose: Congestion present.      Right Sinus: Maxillary sinus tenderness present. No frontal sinus tenderness.      Left Sinus: Maxillary sinus tenderness present. No frontal sinus tenderness.      Mouth/Throat:      Mouth: Mucous membranes are moist.      Pharynx: Oropharynx is clear. No oropharyngeal exudate or posterior oropharyngeal erythema.   Cardiovascular:      Rate and Rhythm: Normal rate and regular rhythm.      Pulses: Normal pulses.      Heart sounds: Normal heart sounds.   Pulmonary:      Effort: Pulmonary effort is normal.      Breath sounds: Normal breath sounds. No wheezing, rhonchi or rales.   Skin:     General: Skin is warm and dry.   Neurological:      Mental Status: She is alert.         ED Course        Procedures        Results for orders placed or performed during the hospital encounter of 04/15/24 (from the past 24 hour(s))   Chest XR,  PA & LAT    Narrative    PROCEDURE:  XR CHEST 2 VIEWS    HISTORY:  cough and sob.     COMPARISON:  2018    FINDINGS:   The cardiac silhouette is normal in size. The pulmonary vasculature is  normal.  The lungs are clear. No pleural effusion or pneumothorax.      Impression    IMPRESSION:  No acute cardiopulmonary disease.      RJ LARSEN MD         SYSTEM ID:  [id number]       Medications - No data to display    Assessments & Plan (with Medical Decision Making)     I have reviewed the nursing notes.    I have reviewed the findings, diagnosis, plan and need for follow up with the patient.  Melba Hill is a 59 year old female who [facility] with a 4 week history of cough, fatigue, congestion, and shortness of breath. Mother recently dx with influenza. She denies fevers, chest pain, abd pain, and n/v/d. Non smoker. No hx of asthma or COPD. No recent abx. Has tried  tylenol and OTC cough medication with minimal relief.     MDM: vital signs normal, afebrile. Non toxic in appearance with no noted distress. Lungs clear, heart tones regular. Able to speak in complete sentences without difficulty. CXR reviewed with No acute cardiopulmonary disease, per radiologist. Discussed viral and bacterial etiologies. Given her length of symptoms, will prescribe augmentin. Prednisone avoided at this time as she does have DM2 and does not check BG. Supportive measures and return precautions discussed. She is in agreement with plan.     (J01.90) Acute sinusitis with symptoms > 10 days, (J20.9) Acute bronchitis with symptoms > 10 days  Plan: Antibiotics 2 times daily for 7 days. Yogurt or probiotic while taking.   Push fluids.   Tylenol and ibuprofen as needed.   Return with any chest pain, increased shortness of breath, or other concerns.   Follow up in the clinic for a recheck. Understanding verbalized.     New Prescriptions    AMOXICILLIN-CLAVULANATE (AUGMENTIN) 875-125 MG TABLET    Take 1 tablet by mouth 2 times daily for 7 days       Final diagnoses:   Acute sinusitis with symptoms > 10 days   Acute bronchitis with symptoms > 10 days       4/15/2024   [facility]       Anabella Higgins NP  04/15/24 [id number]

## 2024-04-28 ENCOUNTER — HEALTH MAINTENANCE LETTER (OUTPATIENT)
Age: 59
End: 2024-04-28

## 2024-07-07 ENCOUNTER — HEALTH MAINTENANCE LETTER (OUTPATIENT)
Age: 59
End: 2024-07-07

## 2024-11-23 ENCOUNTER — HEALTH MAINTENANCE LETTER (OUTPATIENT)
Age: 59
End: 2024-11-23

## 2025-03-09 ENCOUNTER — HEALTH MAINTENANCE LETTER (OUTPATIENT)
Age: 60
End: 2025-03-09

## 2025-05-11 ENCOUNTER — HEALTH MAINTENANCE LETTER (OUTPATIENT)
Age: 60
End: 2025-05-11

## 2025-06-22 ENCOUNTER — HEALTH MAINTENANCE LETTER (OUTPATIENT)
Age: 60
End: 2025-06-22